# Patient Record
Sex: MALE | Race: WHITE | NOT HISPANIC OR LATINO | Employment: FULL TIME | URBAN - METROPOLITAN AREA
[De-identification: names, ages, dates, MRNs, and addresses within clinical notes are randomized per-mention and may not be internally consistent; named-entity substitution may affect disease eponyms.]

---

## 2017-06-08 ENCOUNTER — OFFICE VISIT (OUTPATIENT)
Dept: MEDICAL GROUP | Facility: MEDICAL CENTER | Age: 42
End: 2017-06-08
Payer: COMMERCIAL

## 2017-06-08 VITALS
BODY MASS INDEX: 35.56 KG/M2 | HEIGHT: 71 IN | OXYGEN SATURATION: 96 % | TEMPERATURE: 98.6 F | HEART RATE: 72 BPM | SYSTOLIC BLOOD PRESSURE: 128 MMHG | RESPIRATION RATE: 14 BRPM | WEIGHT: 254 LBS | DIASTOLIC BLOOD PRESSURE: 80 MMHG

## 2017-06-08 DIAGNOSIS — E11.65 TYPE 2 DIABETES MELLITUS WITH HYPERGLYCEMIA, WITHOUT LONG-TERM CURRENT USE OF INSULIN (HCC): ICD-10-CM

## 2017-06-08 DIAGNOSIS — F31.70 BIPOLAR DISORDER IN FULL REMISSION, MOST RECENT EPISODE UNSPECIFIED TYPE (HCC): ICD-10-CM

## 2017-06-08 DIAGNOSIS — E66.9 OBESITY (BMI 35.0-39.9 WITHOUT COMORBIDITY): ICD-10-CM

## 2017-06-08 DIAGNOSIS — Z72.0 TOBACCO USE: ICD-10-CM

## 2017-06-08 LAB
HBA1C MFR BLD: 13.2 % (ref ?–5.8)
INT CON NEG: NEGATIVE
INT CON POS: POSITIVE

## 2017-06-08 PROCEDURE — 99214 OFFICE O/P EST MOD 30 MIN: CPT | Performed by: FAMILY MEDICINE

## 2017-06-08 PROCEDURE — 83036 HEMOGLOBIN GLYCOSYLATED A1C: CPT | Performed by: FAMILY MEDICINE

## 2017-06-08 RX ORDER — TOPIRAMATE 50 MG/1
50 TABLET, FILM COATED ORAL DAILY
Status: ON HOLD | COMMUNITY
End: 2018-08-13

## 2017-06-08 RX ORDER — TOPIRAMATE 50 MG/1
50 TABLET, FILM COATED ORAL 2 TIMES DAILY
COMMUNITY
End: 2017-06-08

## 2017-06-08 RX ORDER — FLUOXETINE HYDROCHLORIDE 20 MG/1
40 CAPSULE ORAL DAILY
Status: ON HOLD | COMMUNITY
End: 2018-08-13

## 2017-06-08 RX ORDER — TRAZODONE HYDROCHLORIDE 100 MG/1
100 TABLET ORAL NIGHTLY PRN
COMMUNITY
End: 2018-09-20

## 2017-06-08 ASSESSMENT — PATIENT HEALTH QUESTIONNAIRE - PHQ9: CLINICAL INTERPRETATION OF PHQ2 SCORE: 0

## 2017-06-08 NOTE — Clinical Note
[employer]  Luz Peña M.D.  [address]  [place] [address]  Fax: [phone]   Authorization for Release/Disclosure of   Protected Health Information   Name: TYLOR ACEVEDO : 1975 SSN: [id number]   Address: [facility] [address]  [place] [address] Phone:    [phone] (home)    I authorize the entity listed below to release/disclose the PHI below to:   [employer] [facility]/Luz Peña M.D. and Luz Peña M.D.   Provider or Entity Name:  [facility]   City, State, Zip  [place], [place] Phone:      Fax:     Reason for request: continuity of care   Information to be released:    [  ] LAST COLONOSCOPY,  including any PATH REPORT and follow-up  [  ] LAST FIT/COLOGUARD RESULT [  ] LAST DEXA  [  ] LAST MAMMOGRAM  [  ] LAST PAP  [  ] LAST LABS [x  ] RETINA EXAM REPORT  [  ] IMMUNIZATION RECORDS  [  ] Release all info      [  ] Check here and initial the line next to each item to release ALL health information INCLUDING  _____ Care and treatment for drug and / or alcohol abuse  _____ HIV testing, infection status, or AIDS  _____ Genetic Testing    DATES OF SERVICE OR TIME PERIOD TO BE DISCLOSED: _____________  I understand and acknowledge that:  * This Authorization may be revoked at any time by you in writing, except if your health information has already been used or disclosed.  * Your health information that will be used or disclosed as a result of you signing this authorization could be re-disclosed by the recipient. If this occurs, your re-disclosed health information may no longer be protected by State or Federal laws.  * You may refuse to sign this Authorization. Your refusal will not affect your ability to obtain treatment.  * This Authorization becomes effective upon signing and will  on (date) __________.      If no date is indicated, this Authorization will  one (1) year from the signature date.    Name: Tylor Acevedo    Signature:   Date:          6/8/2017       PLEASE FAX REQUESTED RECORDS BACK TO: [phone]

## 2017-06-08 NOTE — PROGRESS NOTES
cc:  diabetes    Subjective:     Tylor Hill is a 41 y.o. male presenting to \[facility]\"" care:      1. Diabetes: was diagnosed with diabetes several years ago, lost about 75lbs and it resolved.  However, he had his DOT re-certification several months ago and was diagnosed with diabetes again.  Weight has been stable.  Notes some increased thirst and urination.  Was on metformin and insulin in the past, tolerated them well.  Otherwise feels well, no vision changes, chest pain, sob, abd pain, leg swelling, numbness.  Had an eye exam about 6 months ago.    2. Bipolar: managed by psych. Is on prozac 40mg daily, topamax 50mg daily, and trazodone 100mg prn for sleep.  Well controlled. Denies any depression or sol or anxiety.    Review of systems:  See above.          Current outpatient prescriptions:   •  fluoxetine (PROZAC) 20 MG Cap, Take 40 mg by mouth every day., Disp: , Rfl:   •  trazodone (DESYREL) 100 MG Tab, Take 100 mg by mouth at bedtime as needed., Disp: , Rfl:   •  topiramate (TOPAMAX) 50 MG tablet, Take 50 mg by mouth every day., Disp: , Rfl:   •  metformin (GLUCOPHAGE) 1000 MG tablet, Take 1 Tab by mouth 2 times a day, with meals., Disp: 180 Tab, Rfl: 3  •  nicotine polacrilex (NICORETTE) 4 MG gum, Take 4 mg by mouth every hour while awake., Disp: 270 Each, Rfl: 3    Allergies   Allergen Reactions   • Hydrocodone-Acetaminophen Hives       Past Medical History   Diagnosis Date   • Mononucleosis    • Diabetes    • Unspecified disorder of thyroid      Past Surgical History   Procedure Laterality Date   • Other orthopedic surgery       1996 - L4-5 diskectomy     No family history on file.  Social History     Social History   • Marital Status:      Spouse Name: N/A   • Number of Children: N/A   • Years of Education: N/A     Occupational History   • Not on file.     Social History Main Topics   • Smoking status: Never Smoker    • Smokeless tobacco: Current User     Types: Chew      Comment:  "chewing tobacco once daily   • Alcohol Use: No   • Drug Use: No   • Sexual Activity: Not on file     Other Topics Concern   • Not on file     Social History Narrative       Objective:     Vitals: /80 mmHg  Pulse 72  Temp(Src) 37 °C (98.6 °F)  Resp 14  Ht 1.803 m (5' 11\")  Wt 115.214 kg (254 lb)  BMI 35.44 kg/m2  SpO2 96%  General: Alert, pleasant, NAD  HEENT: Normocephalic.  PERRL, EOMI, no icterus or pallor.  Conjunctivae and lids normal. External ears normal.  Neck supple.  No thyromegaly or masses palpated. No cervical or supraclavicular lymphadenopathy.  Heart: Regular rate and rhythm.  S1 and S2 normal.  No murmurs appreciated.  Respiratory: Normal respiratory effort.  Clear to auscultation bilaterally.    Abdomen: Non-distended, soft  Skin: Warm, dry, no rashes.  Musculoskeletal: Gait is normal.  Moves all extremities well.  Psych:  Affect/mood is normal, judgement is good, memory is intact, grooming is appropriate.    poc 13.2    Assessment/Plan:     Tylor was seen today for diabetes mellitus.    Diagnoses and all orders for this visit:    Type 2 diabetes mellitus with hyperglycemia, without long-term current use of insulin (CMS-HCC)  Discussed management of diabetes, medications, annual screens. He did not want to start insulin again.  Will start baby aspirin.  Will also start metformin 500mg daily and titrate up to 1000mg bid.  F/u in 4 weeks, will add another med then, will do foot check, eye records requested. Will also check cbc, cmp, lipids, tsh today; pt has been fasting.  -     POCT Hemoglobin A1C  -     CBC WITHOUT DIFFERENTIAL; Future  -     COMP METABOLIC PANEL; Future  -     LIPID PROFILE; Future  -     TSH WITH REFLEX TO FT4; Future  -     metformin (GLUCOPHAGE) 1000 MG tablet; Take 1 Tab by mouth 2 times a day, with meals.    Obesity (BMI 35.0-39.9 without comorbidity) ([facility])  -     Patient identified as having weight management issue.  Appropriate orders and counseling given.  -   "   CBC WITHOUT DIFFERENTIAL; Future  -     COMP METABOLIC PANEL; Future  -     LIPID PROFILE; Future  -     TSH WITH REFLEX TO FT4; Future    Tobacco use  Advised to quit, pt is interested in trying the gum. Script sent        -     nicotine polacrilex (NICORETTE) 4 MG gum; Take 4 mg by mouth every hour while awake.    Bipolar disorder in full remission, most recent episode unspecified type (CMS-HCC)  Noted, stable. Continue current meds and f/u with psych       Patient was seen for a total of 25 minutes face-to-face, with more than half of the time spent counseling or coordinating care regarding the above mentioned problems.     Return for Med check, F/U Diabetes.

## 2017-06-08 NOTE — PATIENT INSTRUCTIONS
Gum: Chew 1 piece of gum when urge to smoke occurs. If strong or frequent cravings are present after 1 piece of gum, may use a second piece within the hour (do not continuously use one piece after the other). Chew slowly until it tingles, then place gum between cheek and gum until tingle is gone; repeat process until most of tingle is gone (~30 minutes). Do not eat or drink 15 minutes before using or while the gum is in mouth.    Patients who smoke their first cigarette within 30 minutes of waking should use the 4 mg strength; otherwise the 2 mg strength is recommended.    Use according to the following 12-week dosing schedule:  Weeks 1 to 6: Chew 1 piece of gum every 1 to 2 hours (maximum: 24 pieces/day); to increase chances of quitting, chew at least 9 pieces/day during the first 6 weeks  Weeks 7 to 9: Chew 1 piece of gum every 2 to 4 hours (maximum: 24 pieces/day)  Weeks 10 to 12: Chew 1 piece of gum every 4 to 8 hours (maximum: 24 pieces/day)

## 2017-06-08 NOTE — MR AVS SNAPSHOT
"        Tylor Hill   2017 8:20 AM   Office Visit   MRN: [id number]    Department:  [address] [place] [address]   Dept Phone:  [phone]    Description:  Male : 1975   Provider:  Luz Peña M.D.           Reason for Visit     Diabetes Mellitus           Allergies as of 2017     Allergen Noted Reactions    Hydrocodone-Acetaminophen 10/06/2007   Hives      You were diagnosed with     Type 2 diabetes mellitus with hyperglycemia, without long-term current use of insulin (CMS-HCC)   [5481653]       Obesity (BMI 35.0-39.9 without comorbidity) ([facility])   [623497]       Tobacco use   [071661]         Vital Signs     Blood Pressure Pulse Temperature Respirations Height Weight    128/80 mmHg 72 37 °C (98.6 °F) 14 1.803 m (5' 11\") 115.214 kg (254 lb)    Body Mass Index Oxygen Saturation Smoking Status             35.44 kg/m2 96% Never Smoker          Basic Information     Date Of Birth Sex Race Ethnicity Preferred Language    1975 Male White Non- English      Your appointments     Jul 10, 2017  7:20 AM   Established Patient with Luz Peña M.D.   [facility] [address]    [address] [facility] [address]  [facility] [address] [phone]           You will be receiving a confirmation call a few days before your appointment from our automated call confirmation system.              Problem List              ICD-10-CM Priority Class Noted - Resolved    Type 2 diabetes mellitus with hyperglycemia, without long-term current use of insulin (CMS-HCC) E11.65   2017 - Present    Obesity (BMI 35.0-39.9 without comorbidity) ([facility]) E66.9   2017 - Present    Tobacco use Z72.0   2017 - Present      Health Maintenance        Date Due Completion Dates    IMM DTaP/Tdap/Td Vaccine (1 - Tdap) 1992 ---            Results     POCT Hemoglobin A1C      Component    Glycohemoglobin    13.2    Internal Control Negative    Negative    Internal Control Positive    Positive   "                     Current Immunizations     Influenza Vaccine Quad Inj (Pf) 9/27/2014      Below and/or attached are the medications your provider expects you to take. Review all of your home medications and newly ordered medications with your provider and/or pharmacist. Follow medication instructions as directed by your provider and/or pharmacist. Please keep your medication list with you and share with your provider. Update the information when medications are discontinued, doses are changed, or new medications (including over-the-counter products) are added; and carry medication information at all times in the event of emergency situations     Allergies:  HYDROCODONE-ACETAMINOPHEN - Hives               Medications  Valid as of: June 08, 2017 -  8:45 AM    Generic Name Brand Name Tablet Size Instructions for use    FLUoxetine HCl (Cap) PROZAC 20 MG Take 40 mg by mouth every day.        MetFORMIN HCl (Tab) GLUCOPHAGE 1000 MG Take 1 Tab by mouth 2 times a day, with meals.        Nicotine Polacrilex (Gum) NICORETTE 4 MG Take 4 mg by mouth every hour while awake.        Topiramate (Tab) TOPAMAX 50 MG Take 50 mg by mouth every day.        TraZODone HCl (Tab) DESYREL 100 MG Take 100 mg by mouth at bedtime as needed.        .                 Medicines prescribed today were sent to:     [facility][id number] - [place], [place] - [address]    [address] [place] [address]    Phone: [phone] Fax: [phone]    Open 24 Hours?: No      Medication refill instructions:       If your prescription bottle indicates you have medication refills left, it is not necessary to call your provider’s office. Please contact your pharmacy and they will refill your medication.    If your prescription bottle indicates you do not have any refills left, you may request refills at any time through one of the following ways: The online [employer] system (except Urgent Care), by calling your provider’s office, or by asking your pharmacy to  contact your provider’s office with a refill request. Medication refills are processed only during regular business hours and may not be available until the next business day. Your provider may request additional information or to have a follow-up visit with you prior to refilling your medication.   *Please Note: Medication refills are assigned a new Rx number when refilled electronically. Your pharmacy may indicate that no refills were authorized even though a new prescription for the same medication is available at the pharmacy. Please request the medicine by name with the pharmacy before contacting your provider for a refill.        Your To Do List     Future Labs/Procedures Complete By Expires    CBC WITHOUT DIFFERENTIAL  As directed 12/6/2017    COMP METABOLIC PANEL  As directed 6/8/2018    LIPID PROFILE  As directed 6/8/2018    TSH WITH REFLEX TO FT4  As directed 6/8/2018      Instructions    Gum: Chew 1 piece of gum when urge to smoke occurs. If strong or frequent cravings are present after 1 piece of gum, may use a second piece within the hour (do not continuously use one piece after the other). Chew slowly until it tingles, then place gum between cheek and gum until tingle is gone; repeat process until most of tingle is gone (~30 minutes). Do not eat or drink 15 minutes before using or while the gum is in mouth.    Patients who smoke their first cigarette within 30 minutes of waking should use the 4 mg strength; otherwise the 2 mg strength is recommended.    Use according to the following 12-week dosing schedule:  Weeks 1 to 6: Chew 1 piece of gum every 1 to 2 hours (maximum: 24 pieces/day); to increase chances of quitting, chew at least 9 pieces/day during the first 6 weeks  Weeks 7 to 9: Chew 1 piece of gum every 2 to 4 hours (maximum: 24 pieces/day)  Weeks 10 to 12: Chew 1 piece of gum every 4 to 8 hours (maximum: 24 pieces/day)            [employer] Access Code: [email or web address]  Expires: 7/6/2017 12:34  PM    [employer]  A secure, online tool to manage your health information     [employer]’s StreetFire® is a secure, online tool that connects you to your personalized health information from the privacy of your home -- day or night - making it very easy for you to manage your healthcare. Once the activation process is completed, you can even access your medical information using the StreetFire ilan, which is available for free in the [employer] Ilan store or Google Play store.     StreetFire provides the following levels of access (as shown below):   My Chart Features   [facility] Primary Care Doctor [facility]  Specialists [facility]  Urgent  Care Non-[facility]  Primary Care  Doctor   Email your healthcare team securely and privately 24/7 X X X    Manage appointments: schedule your next appointment; view details of past/upcoming appointments X      Request prescription refills. X      View recent personal medical records, including lab and immunizations X X X X   View health record, including health history, allergies, medications X X X X   Read reports about your outpatient visits, procedures, consult and ER notes X X X X   See your discharge summary, which is a recap of your hospital and/or ER visit that includes your diagnosis, lab results, and care plan. X X       How to register for StreetFire:  1. Go to  [email or web address].  2. Click on the Sign Up Now box, which takes you to the New Member Sign Up page. You will need to provide the following information:  a. Enter your StreetFire Access Code exactly as it appears at the top of this page. (You will not need to use this code after you’ve completed the sign-up process. If you do not sign up before the expiration date, you must request a new code.)   b. Enter your date of birth.   c. Enter your home email address.   d. Click Submit, and follow the next screen’s instructions.  3. Create a StreetFire ID. This will be your StreetFire login ID and cannot be changed, so think of one that is secure and  easy to remember.  4. Create a Spool password. You can change your password at any time.  5. Enter your Password Reset Question and Answer. This can be used at a later time if you forget your password.   6. Enter your e-mail address. This allows you to receive e-mail notifications when new information is available in Spool.  7. Click Sign Up. You can now view your health information.    For assistance activating your Spool account, call [phone]        Quit Tobacco Information     Do you want to quit using tobacco?    Quitting tobacco decreases risks of cancer, heart and lung disease, increases life expectancy, improves sense of taste and smell, and increases spending money, among other benefits.    If you are thinking about quitting, we can help.  • [facility] Quit Tobacco Program: 849.292.7253  o Program occurs weekly for four weeks and includes pharmacist consultation on products to support quitting smoking or chewing tobacco. A provider referral is needed for pharmacist consultation.  • Tobacco Users Help Hotline: 3-151-QUIT-NOW (176-7676) or https://[place].quitlogix.org/  o Free, confidential telephone and online coaching for [place] residents. Sessions are designed on a schedule that is convenient for you. Eligible clients receive free nicotine replacement therapy.  • Nationally: www.smokefree.gov  o Information and professional assistance to support both immediate and long-term needs as you become, and remain, a non-smoker. Smokefree.gov allows you to choose the help that best fits your needs.

## 2017-06-09 LAB
ALBUMIN SERPL-MCNC: 4.3 G/DL (ref 3.5–5.5)
ALBUMIN/GLOB SERPL: 1.7 {RATIO} (ref 1.2–2.2)
ALP SERPL-CCNC: 93 IU/L (ref 39–117)
ALT SERPL-CCNC: 13 IU/L (ref 0–44)
AST SERPL-CCNC: 12 IU/L (ref 0–40)
BILIRUB SERPL-MCNC: 0.4 MG/DL (ref 0–1.2)
BUN SERPL-MCNC: 12 MG/DL (ref 6–24)
BUN/CREAT SERPL: 17 (ref 9–20)
CALCIUM SERPL-MCNC: 9.7 MG/DL (ref 8.7–10.2)
CHLORIDE SERPL-SCNC: 99 MMOL/L (ref 96–106)
CHOLEST SERPL-MCNC: 204 MG/DL (ref 100–199)
CO2 SERPL-SCNC: 18 MMOL/L (ref 18–29)
COMMENT 011824: ABNORMAL
CREAT SERPL-MCNC: 0.7 MG/DL (ref 0.76–1.27)
ERYTHROCYTE [DISTWIDTH] IN BLOOD BY AUTOMATED COUNT: 12.9 % (ref 12.3–15.4)
GLOBULIN SER CALC-MCNC: 2.5 G/DL (ref 1.5–4.5)
GLUCOSE SERPL-MCNC: 361 MG/DL (ref 65–99)
HCT VFR BLD AUTO: 50.5 % (ref 37.5–51)
HDLC SERPL-MCNC: 41 MG/DL
HGB BLD-MCNC: 16.9 G/DL (ref 12.6–17.7)
LDLC SERPL CALC-MCNC: 110 MG/DL (ref 0–99)
MCH RBC QN AUTO: 31.3 PG (ref 26.6–33)
MCHC RBC AUTO-ENTMCNC: 33.5 G/DL (ref 31.5–35.7)
MCV RBC AUTO: 94 FL (ref 79–97)
NRBC BLD AUTO-RTO: NORMAL %
PLATELET # BLD AUTO: 261 X10E3/UL (ref 150–379)
POTASSIUM SERPL-SCNC: 4.3 MMOL/L (ref 3.5–5.2)
PROT SERPL-MCNC: 6.8 G/DL (ref 6–8.5)
RBC # BLD AUTO: 5.4 X10E6/UL (ref 4.14–5.8)
SODIUM SERPL-SCNC: 135 MMOL/L (ref 134–144)
T4 FREE SERPL-MCNC: 1.38 NG/DL (ref 0.82–1.77)
TRIGL SERPL-MCNC: 264 MG/DL (ref 0–149)
TSH SERPL DL<=0.005 MIU/L-ACNC: 1.3 UIU/ML (ref 0.45–4.5)
VLDLC SERPL CALC-MCNC: 53 MG/DL (ref 5–40)
WBC # BLD AUTO: 6.4 X10E3/UL (ref 3.4–10.8)

## 2017-06-19 ENCOUNTER — TELEPHONE (OUTPATIENT)
Dept: MEDICAL GROUP | Facility: MEDICAL CENTER | Age: 42
End: 2017-06-19

## 2017-06-19 NOTE — TELEPHONE ENCOUNTER
1. Caller Name: Tylor                      Call Back Number: [phone] (home)     2. 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 He wants to make an apt to discuss some paperwork and meds. I called and left  to calls us back to schedule that apt for him.    3. Patient approves office to leave a detailed voicemail/MyChart message: N\A

## 2017-06-20 ENCOUNTER — OFFICE VISIT (OUTPATIENT)
Dept: MEDICAL GROUP | Facility: MEDICAL CENTER | Age: 42
End: 2017-06-20
Payer: COMMERCIAL

## 2017-06-20 VITALS
TEMPERATURE: 96.7 F | OXYGEN SATURATION: 99 % | BODY MASS INDEX: 34.86 KG/M2 | HEART RATE: 88 BPM | RESPIRATION RATE: 14 BRPM | SYSTOLIC BLOOD PRESSURE: 122 MMHG | WEIGHT: 249 LBS | HEIGHT: 71 IN | DIASTOLIC BLOOD PRESSURE: 76 MMHG

## 2017-06-20 DIAGNOSIS — Z72.0 TOBACCO USE: ICD-10-CM

## 2017-06-20 DIAGNOSIS — E11.65 TYPE 2 DIABETES MELLITUS WITH HYPERGLYCEMIA, WITHOUT LONG-TERM CURRENT USE OF INSULIN (HCC): ICD-10-CM

## 2017-06-20 PROBLEM — E66.9 OBESITY (BMI 35.0-39.9 WITHOUT COMORBIDITY): Status: RESOLVED | Noted: 2017-06-08 | Resolved: 2017-06-20

## 2017-06-20 PROCEDURE — 99213 OFFICE O/P EST LOW 20 MIN: CPT | Performed by: FAMILY MEDICINE

## 2017-06-20 RX ORDER — LANCETS 30 GAUGE
EACH MISCELLANEOUS
Qty: 200 EACH | Refills: 3 | Status: ON HOLD | OUTPATIENT
Start: 2017-06-20 | End: 2018-08-03

## 2017-06-20 NOTE — PROGRESS NOTES
cc:  diabetes    Subjective:     Tylor Hill is a 41 y.o. male presenting to discuss his failed DOT physical. His BG at the visit was 301 and failed his physical, his license  last night.  He brings in forms to fill out once his diabetes is under better control and plans to get another physical soon.  He has started the metformin 1000mg BID, denies any side effects.  No hypoglycemic episodes.  He recently found his meter, is not sure if it works, but doesn't have test strips, etc.  Has eliminated most carbs/sugars from his diet, drinks mostly water, but occasionally has a diet coke for the caffeine.  Has lost about 5 lbs since his last visit. Has started the baby aspirin. Denies any chest pain, sob, abd pain, leg swelling.      Review of systems:  See above.          Current outpatient prescriptions:   •  aspirin EC (ECOTRIN) 81 MG Tablet Delayed Response, Take 81 mg by mouth every day., Disp: , Rfl:   •  Dulaglutide 0.75 MG/0.5ML Solution Pen-injector, Inject 0.75 mg as instructed every 7 days., Disp: 1 PEN, Rfl: 1  •  Blood Glucose Monitoring Suppl Kit, Test blood sugar as recommended by provider. Dx E11.65, Disp: 1 Kit, Rfl: PRN  •  Lancets Misc, Use 1x/day and PRN for signs and symptoms of high or low blood sugar. Dx E11.65, Disp: 200 Each, Rfl: 3  •  Blood Glucose Monitoring Suppl SUPPLIES Misc, For test strips: Use 1x/day and PRN for signs and symptoms of high or low blood sugar. Dx E11.65, Disp: 1 Each, Rfl: PRN  •  fluoxetine (PROZAC) 20 MG Cap, Take 40 mg by mouth every day., Disp: , Rfl:   •  trazodone (DESYREL) 100 MG Tab, Take 100 mg by mouth at bedtime as needed., Disp: , Rfl:   •  topiramate (TOPAMAX) 50 MG tablet, Take 50 mg by mouth every day., Disp: , Rfl:   •  metformin (GLUCOPHAGE) 1000 MG tablet, Take 1 Tab by mouth 2 times a day, with meals., Disp: 180 Tab, Rfl: 3  •  nicotine polacrilex (NICORETTE) 4 MG gum, Take 4 mg by mouth every hour while awake., Disp: 270 Each, Rfl:  "3    Allergies   Allergen Reactions   • Hydrocodone-Acetaminophen Hives       Past Medical History   Diagnosis Date   • Mononucleosis    • Diabetes    • Unspecified disorder of thyroid      Past Surgical History   Procedure Laterality Date   • Other orthopedic surgery       1996 - L4-5 diskectomy     No family history on file.  Social History     Social History   • Marital Status:      Spouse Name: N/A   • Number of Children: N/A   • Years of Education: N/A     Occupational History   • Not on file.     Social History Main Topics   • Smoking status: Never Smoker    • Smokeless tobacco: Current User     Types: Chew      Comment: chewing tobacco once daily   • Alcohol Use: No   • Drug Use: No   • Sexual Activity: Not on file     Other Topics Concern   • Not on file     Social History Narrative           Objective:     Vitals: /76 mmHg  Pulse 88  Temp(Src) 35.9 °C (96.7 °F)  Resp 14  Ht 1.803 m (5' 10.98\")  Wt 112.946 kg (249 lb)  BMI 34.74 kg/m2  SpO2 99%  General: Alert, pleasant, NAD  HEENT: Normocephalic.  Psych:  Affect/mood is normal, judgement is good, memory is intact, grooming is appropriate.    Assessment/Plan:     Tylor was seen today for diabetes mellitus.    Diagnoses and all orders for this visit:    Type 2 diabetes mellitus with hyperglycemia, without long-term current use of insulin (CMS-[facility])  Reviewed paperwork with him, was returned to him but will fill out once his BGs improve. Will send to diabetes ed as this was listed as a requirement on his paperwork.  Will also start trulicity once a week.  He will start checking BGs at home and writing them down.  Will return in 2 weeks with the log.  If BGs are improved then, he plans to get another DOT physical so that he can start working.  He will let me know if trulicity is not covered/too expensive. Will try another glp-1.  We also discussed a statin, will hold off for now as he is working on lifestyle " modification and re-assess in 6 months.  ascvd risk is 11.8 as he chews tobacco, is 4.1 if he quits  -     REFERRAL TO DIABETIC EDUCATION Diabetes Self Management Education / Training (DSME/T) and Medical Nutrition Therapy (MNT): Initial Group DSME/MNT as authorized by payor; DSME/T Content: Monitoring Diabetes, Diabetes as disease process, Nutritional Ma  -     Blood Glucose Monitoring Suppl Kit; Test blood sugar as recommended by provider. Dx E11.65  -     Lancets Misc; Use 1x/day and PRN for signs and symptoms of high or low blood sugar. Dx E11.65  -     Blood Glucose Monitoring Suppl SUPPLIES Misc; For test strips: Use 1x/day and PRN for signs and symptoms of high or low blood sugar. Dx E11.65  -     Dulaglutide 0.75 MG/0.5ML Solution Pen-injector; Inject 0.75 mg as instructed every 7 days.    Tobacco use  Advised to quit, pt has been cutting down        Return in about 2 weeks (around 7/4/2017) for F/U Diabetes.

## 2017-06-20 NOTE — MR AVS SNAPSHOT
"        Tylor Hill   2017 9:40 AM   Office Visit   MRN: [id number]    Department:  [address] [place] [address]   Dept Phone:  [phone]    Description:  Male : 1975   Provider:  Luz Peña M.D.           Reason for Visit     Diabetes Mellitus           Allergies as of 2017     Allergen Noted Reactions    Hydrocodone-Acetaminophen 10/06/2007   Hives      You were diagnosed with     Type 2 diabetes mellitus with hyperglycemia, without long-term current use of insulin (CMS-HCC)   [5195151]       Tobacco use   [521499]         Vital Signs     Blood Pressure Pulse Temperature Respirations Height Weight    122/76 mmHg 88 35.9 °C (96.7 °F) 14 1.803 m (5' 10.98\") 112.946 kg (249 lb)    Body Mass Index Oxygen Saturation Smoking Status             34.74 kg/m2 99% Never Smoker          Basic Information     Date Of Birth Sex Race Ethnicity Preferred Language    1975 Male White Non- English      Your appointments     Jul 10, 2017  7:20 AM   Established Patient with Luz Peña M.D.   [facility] [address]    [address] [facility] [address] [place] [id number] [phone]           You will be receiving a confirmation call a few days before your appointment from our automated call confirmation system.            2017 10:40 AM   Established Patient with Luz Peña M.D.   [facility] [address]    [address] [facility] [address]  [facility] [id number] [phone]           You will be receiving a confirmation call a few days before your appointment from our automated call confirmation system.              Problem List              ICD-10-CM Priority Class Noted - Resolved    Type 2 diabetes mellitus with hyperglycemia, without long-term current use of insulin (CMS-HCC) E11.65   2017 - Present    Tobacco use Z72.0   2017 - Present    Bipolar disorder in full remission (CMS-HCC) F31.70   2017 - Present      Health " Maintenance        Date Due Completion Dates    IMM HEP B VACCINE (1 of 3 - Primary Series) 1975 ---    DIABETES MONOFILAMENT / LE EXAM 3/6/1974 ---    URINE ACR / MICROALBUMIN 9/6/1991 ---    IMM DTaP/Tdap/Td Vaccine (1 - Tdap) 9/6/1992 ---    IMM PNEUMOCOCCAL 19-64 (ADULT) MEDIUM RISK SERIES (1 of 1 - PPSV23) 9/6/1992 ---    A1C SCREENING 12/8/2017 6/8/2017, 6/14/2012, 9/4/2009    RETINAL SCREENING 12/9/2017 12/9/2016    FASTING LIPID PROFILE 6/8/2018 6/8/2017, 6/14/2012, 9/4/2009    SERUM CREATININE 6/8/2018 6/8/2017, 6/14/2012, 6/13/2012, 9/4/2009, 9/3/2009            Current Immunizations     Influenza Vaccine Quad Inj (Pf) 9/27/2014      Below and/or attached are the medications your provider expects you to take. Review all of your home medications and newly ordered medications with your provider and/or pharmacist. Follow medication instructions as directed by your provider and/or pharmacist. Please keep your medication list with you and share with your provider. Update the information when medications are discontinued, doses are changed, or new medications (including over-the-counter products) are added; and carry medication information at all times in the event of emergency situations     Allergies:  HYDROCODONE-ACETAMINOPHEN - Hives               Medications  Valid as of: June 20, 2017 -  9:45 AM    Generic Name Brand Name Tablet Size Instructions for use    Aspirin (Tablet Delayed Response) ECOTRIN 81 MG Take 81 mg by mouth every day.        Blood Glucose Monitoring Suppl (Kit) Blood Glucose Monitoring Suppl  Test blood sugar as recommended by provider. Dx E11.65        Blood Glucose Monitoring Suppl (Misc) Blood Glucose Monitoring Suppl SUPPLIES For test strips: Use 1x/day and PRN for signs and symptoms of high or low blood sugar. Dx E11.65        Dulaglutide (Solution Pen-injector) Dulaglutide 0.75 MG/0.5ML Inject 0.75 mg as instructed every 7 days.        FLUoxetine HCl (Cap) PROZAC 20 MG Take 40 mg  by mouth every day.        Lancets (Misc) Lancets  Use 1x/day and PRN for signs and symptoms of high or low blood sugar. Dx E11.65        MetFORMIN HCl (Tab) GLUCOPHAGE 1000 MG Take 1 Tab by mouth 2 times a day, with meals.        Nicotine Polacrilex (Gum) NICORETTE 4 MG Take 4 mg by mouth every hour while awake.        Topiramate (Tab) TOPAMAX 50 MG Take 50 mg by mouth every day.        TraZODone HCl (Tab) DESYREL 100 MG Take 100 mg by mouth at bedtime as needed.        .                 Medicines prescribed today were sent to:     [id number] - [place], [place] - [address]    [address] [place] [address]    Phone: [phone] Fax: [phone]    Open 24 Hours?: No      Medication refill instructions:       If your prescription bottle indicates you have medication refills left, it is not necessary to call your provider’s office. Please contact your pharmacy and they will refill your medication.    If your prescription bottle indicates you do not have any refills left, you may request refills at any time through one of the following ways: The online [employer] system (except Urgent Care), by calling your provider’s office, or by asking your pharmacy to contact your provider’s office with a refill request. Medication refills are processed only during regular business hours and may not be available until the next business day. Your provider may request additional information or to have a follow-up visit with you prior to refilling your medication.   *Please Note: Medication refills are assigned a new Rx number when refilled electronically. Your pharmacy may indicate that no refills were authorized even though a new prescription for the same medication is available at the pharmacy. Please request the medicine by name with the pharmacy before contacting your provider for a refill.        Referral     A referral request has been sent to our patient care coordination department. Please allow 3-5 business days  for us to process this request and contact you either by phone or mail. If you do not hear from us by the 5th business day, please call us at [phone].           [employer] Access Code: [email or web address]  Expires: 7/6/2017 12:34 PM    [employer]  A secure, online tool to manage your health information     [employer]’s [employer]® is a secure, online tool that connects you to your personalized health information from the privacy of your home -- day or night - making it very easy for you to manage your healthcare. Once the activation process is completed, you can even access your medical information using the [employer] ilan, which is available for free in the Apple Ilan store or Google Play store.     [employer] provides the following levels of access (as shown below):   My Chart Features   [facility] Doctor [facility] [place] [facility]  Urgent  Care Non-[facility]  Primary Care  Doctor   Email your healthcare team securely and privately 24/7 X X X    Manage appointments: schedule your next appointment; view details of past/upcoming appointments X      Request prescription refills. X      View recent personal medical records, including lab and immunizations X X X X   View health record, including health history, allergies, medications X X X X   Read reports about your outpatient visits, procedures, consult and ER notes X X X X   See your discharge summary, which is a recap of your hospital and/or ER visit that includes your diagnosis, lab results, and care plan. X X       How to register for [employer]:  1. Go to  [email or web address].  2. Click on the Sign Up Now box, which takes you to the New Member Sign Up page. You will need to provide the following information:  a. Enter your [employer] Access Code exactly as it appears at the top of this page. (You will not need to use this code after you’ve completed the sign-up process. If you do not sign up before the expiration date, you must request a new code.)   b. Enter  your date of birth.   c. Enter your home email address.   d. Click Submit, and follow the next screen’s instructions.  3. Create a [employer] ID. This will be your [employer] login ID and cannot be changed, so think of one that is secure and easy to remember.  4. Create a [employer] password. You can change your password at any time.  5. Enter your Password Reset Question and Answer. This can be used at a later time if you forget your password.   6. Enter your e-mail address. This allows you to receive e-mail notifications when new information is available in [employer].  7. Click Sign Up. You can now view your health information.    For assistance activating your [employer] account, call [phone]        Quit Tobacco Information     Do you want to quit using tobacco?    Quitting tobacco decreases risks of cancer, heart and lung disease, increases life expectancy, improves sense of taste and smell, and increases spending money, among other benefits.    If you are thinking about quitting, we can help.  • [facility] Quit Tobacco Program: 862.616.3475  o Program occurs weekly for four weeks and includes pharmacist consultation on products to support quitting smoking or chewing tobacco. A provider referral is needed for pharmacist consultation.  • Tobacco Users Help Hotline: 5-042-QUITNOW (931-8320) or https://[place].quitlogix.org/  o Free, confidential telephone and online coaching for [place] residents. Sessions are designed on a schedule that is convenient for you. Eligible clients receive free nicotine replacement therapy.  • Nationally: www.smokefree.gov  o Information and professional assistance to support both immediate and long-term needs as you become, and remain, a non-smoker. Smokefree.gov allows you to choose the help that best fits your needs.

## 2017-10-11 ENCOUNTER — OFFICE VISIT (OUTPATIENT)
Dept: URGENT CARE | Facility: CLINIC | Age: 42
End: 2017-10-11
Payer: COMMERCIAL

## 2017-10-11 VITALS
OXYGEN SATURATION: 96 % | HEART RATE: 72 BPM | WEIGHT: 225 LBS | SYSTOLIC BLOOD PRESSURE: 120 MMHG | BODY MASS INDEX: 31.5 KG/M2 | HEIGHT: 71 IN | DIASTOLIC BLOOD PRESSURE: 80 MMHG | RESPIRATION RATE: 20 BRPM | TEMPERATURE: 98 F

## 2017-10-11 DIAGNOSIS — J11.1 INFLUENZA-LIKE ILLNESS: ICD-10-CM

## 2017-10-11 PROCEDURE — 99214 OFFICE O/P EST MOD 30 MIN: CPT | Performed by: PHYSICIAN ASSISTANT

## 2017-10-11 RX ORDER — CODEINE PHOSPHATE AND GUAIFENESIN 10; 100 MG/5ML; MG/5ML
5 SOLUTION ORAL EVERY 4 HOURS PRN
Qty: 120 ML | Refills: 0 | Status: ON HOLD | OUTPATIENT
Start: 2017-10-11 | End: 2018-08-13

## 2017-10-11 ASSESSMENT — ENCOUNTER SYMPTOMS
SORE THROAT: 1
WHEEZING: 0
CHILLS: 1
HEADACHES: 1
SPUTUM PRODUCTION: 0
HEMOPTYSIS: 0
SWEATS: 1
PALPITATIONS: 0
FEVER: 1
SHORTNESS OF BREATH: 0
MYALGIAS: 1
RHINORRHEA: 1
COUGH: 1

## 2017-10-11 NOTE — LETTER
October 11, 2017         Patient: Tylor Hill   YOB: 1975   Date of Visit: 10/11/2017           To Whom it May Concern:    Tylor Hill was seen in my clinic on 10/11/2017. Please excuse him from work 10/11-10/13/2017.  If you have any questions or concerns, please don't hesitate to call.        Sincerely,           Radhames Pino P.A.-C.  Electronically Signed

## 2017-10-11 NOTE — PATIENT INSTRUCTIONS
"Influenza, Adult  Influenza (\"the flu\") is a viral infection of the respiratory tract. It occurs more often in winter months because people spend more time in close contact with one another. Influenza can make you feel very sick. Influenza easily spreads from person to person (contagious).  CAUSES   Influenza is caused by a virus that infects the respiratory tract. You can catch the virus by breathing in droplets from an infected person's cough or sneeze. You can also catch the virus by touching something that was recently contaminated with the virus and then touching your mouth, nose, or eyes.  RISKS AND COMPLICATIONS  You may be at risk for a more severe case of influenza if you smoke cigarettes, have diabetes, have chronic heart disease (such as heart failure) or lung disease (such as asthma), or if you have a weakened immune system. Elderly people and pregnant women are also at risk for more serious infections. The most common problem of influenza is a lung infection (pneumonia). Sometimes, this problem can require emergency medical care and may be life threatening.  SIGNS AND SYMPTOMS   Symptoms typically last 4 to 10 days and may include:  · Fever.  · Chills.  · Headache, body aches, and muscle aches.  · Sore throat.  · Chest discomfort and cough.  · Poor appetite.  · Weakness or feeling tired.  · Dizziness.  · Nausea or vomiting.  DIAGNOSIS   Diagnosis of influenza is often made based on your history and a physical exam. A nose or throat swab test can be done to confirm the diagnosis.  TREATMENT   In mild cases, influenza goes away on its own. Treatment is directed at relieving symptoms. For more severe cases, your health care provider may prescribe antiviral medicines to shorten the sickness. Antibiotic medicines are not effective because the infection is caused by a virus, not by bacteria.  HOME CARE INSTRUCTIONS  · Take medicines only as directed by your health care provider.  · Use a cool mist humidifier " to make breathing easier.  · Get plenty of rest until your temperature returns to normal. This usually takes 3 to 4 days.  · Drink enough fluid to keep your urine clear or pale yellow.  · Cover your mouth and nose when coughing or sneezing, and wash your hands well to prevent the virus from spreading.  · Stay home from work or school until the fever is gone for at least 1 full day.  PREVENTION   An annual influenza vaccination (flu shot) is the best way to avoid getting influenza. An annual flu shot is now routinely recommended for all adults in the [place]  SEEK MEDICAL CARE IF:  · You experience chest pain, your cough worsens, or you produce more mucus.  · You have nausea, vomiting, or diarrhea.  · Your fever returns or gets worse.  SEEK IMMEDIATE MEDICAL CARE IF:  · You have trouble breathing, you become short of breath, or your skin or nails become bluish.  · You have severe pain or stiffness in the neck.  · You develop a sudden headache, or pain in the face or ear.  · You have nausea or vomiting that you cannot control.  MAKE SURE YOU:   · Understand these instructions.  · Will watch your condition.  · Will get help right away if you are not doing well or get worse.     This information is not intended to replace advice given to you by your health care provider. Make sure you discuss any questions you have with your health care provider.     Document Released: 12/15/2001 Document Revised: 01/08/2016 Document Reviewed: 03/18/2013  [employer] Interactive Patient Education ©2016 [employer]

## 2017-10-11 NOTE — PROGRESS NOTES
Subjective:      Tylor Hill is a 42 y.o. male who presents with Cough (Couple days dry cough , stuffy nose , fever and bodyache)            Cough   This is a new problem. The problem has been unchanged. The cough is non-productive. Associated symptoms include chills, ear pain, a fever, headaches, myalgias, nasal congestion, rhinorrhea, a sore throat and sweats. Pertinent negatives include no chest pain, hemoptysis, shortness of breath or wheezing. Nothing aggravates the symptoms. He has tried OTC cough suppressant for the symptoms. The treatment provided no relief. There is no history of asthma.       Review of Systems   Constitutional: Positive for chills, fever and malaise/fatigue.   HENT: Positive for congestion, ear pain, rhinorrhea and sore throat.    Respiratory: Positive for cough. Negative for hemoptysis, sputum production, shortness of breath and wheezing.    Cardiovascular: Negative for chest pain and palpitations.   Musculoskeletal: Positive for myalgias.   Neurological: Positive for headaches.   All other systems reviewed and are negative.    PMH:  has a past medical history of Diabetes; Mononucleosis; and Unspecified disorder of thyroid. He also has no past medical history of ASTHMA; COPD; EMPHYSEMA; or Seizure (CMS-HCC).  MEDS:   Current Outpatient Prescriptions:   •  guaifenesin-codeine (CHERATUSSIN AC) Solution oral solution, Take 5 mL by mouth every four hours as needed for Cough., Disp: 120 mL, Rfl: 0  •  fluoxetine (PROZAC) 20 MG Cap, Take 40 mg by mouth every day., Disp: , Rfl:   •  trazodone (DESYREL) 100 MG Tab, Take 100 mg by mouth at bedtime as needed., Disp: , Rfl:   •  topiramate (TOPAMAX) 50 MG tablet, Take 50 mg by mouth every day., Disp: , Rfl:   •  aspirin EC (ECOTRIN) 81 MG Tablet Delayed Response, Take 81 mg by mouth every day., Disp: , Rfl:   •  Dulaglutide 0.75 MG/0.5ML Solution Pen-injector, Inject 0.75 mg as instructed every 7 days., Disp: 1 PEN, Rfl: 1  •  Blood  "Glucose Monitoring Suppl Kit, Test blood sugar as recommended by provider. Dx E11.65, Disp: 1 Kit, Rfl: PRN  •  Lancets Misc, Use 1x/day and PRN for signs and symptoms of high or low blood sugar. Dx E11.65, Disp: 200 Each, Rfl: 3  •  Blood Glucose Monitoring Suppl SUPPLIES Misc, For test strips: Use 1x/day and PRN for signs and symptoms of high or low blood sugar. Dx E11.65, Disp: 1 Each, Rfl: PRN  •  metformin (GLUCOPHAGE) 1000 MG tablet, Take 1 Tab by mouth 2 times a day, with meals., Disp: 180 Tab, Rfl: 3  •  nicotine polacrilex (NICORETTE) 4 MG gum, Take 4 mg by mouth every hour while awake., Disp: 270 Each, Rfl: 3  ALLERGIES:   Allergies   Allergen Reactions   • Hydrocodone-Acetaminophen Hives     SURGHX:   Past Surgical History:   Procedure Laterality Date   • OTHER ORTHOPEDIC SURGERY      1996 - L4-5 diskectomy     SOCHX:  reports that he has never smoked. His smokeless tobacco use includes Chew. He reports that he does not drink alcohol or use drugs.  FH: Family history was reviewed, no pertinent findings to report  Medications, Allergies, and current problem list reviewed today in Epic       Objective:     /80   Pulse 72   Temp 36.7 °C (98 °F)   Resp 20   Ht 1.803 m (5' 11\")   Wt 102.1 kg (225 lb)   SpO2 96%   BMI 31.38 kg/m²      Physical Exam   Constitutional: He is oriented to person, place, and time. He appears well-developed and well-nourished. He is active.  Non-toxic appearance. He does not have a sickly appearance. He does not appear ill. No distress. He is not intubated.   HENT:   Head: Normocephalic and atraumatic.   Right Ear: Hearing, tympanic membrane, external ear and ear canal normal.   Left Ear: Hearing, tympanic membrane, external ear and ear canal normal.   Nose: Nose normal.   Mouth/Throat: Uvula is midline, oropharynx is clear and moist and mucous membranes are normal.   Eyes: Conjunctivae, EOM and lids are normal.   Neck: Normal range of motion. Neck supple. "   Cardiovascular: Regular rhythm, S1 normal, S2 normal and normal heart sounds.  Exam reveals no gallop and no friction rub.    No murmur heard.  Pulmonary/Chest: Effort normal and breath sounds normal. No accessory muscle usage. No apnea, no tachypnea and no bradypnea. He is not intubated. No respiratory distress. He has no decreased breath sounds. He has no wheezes. He has no rhonchi. He has no rales. He exhibits no tenderness.   Musculoskeletal: Normal range of motion.   Neurological: He is alert and oriented to person, place, and time.   Skin: Skin is warm and dry.   Psychiatric: He has a normal mood and affect. His speech is normal and behavior is normal. Judgment and thought content normal.   Vitals reviewed.              Assessment/Plan:     1. Influenza-like illness  - rest, fluids  - Work note given  - guaifenesin-codeine (CHERATUSSIN AC) Solution oral solution; Take 5 mL by mouth every four hours as needed for Cough.  Dispense: 120 mL; Refill: 0    Differential diagnosis, natural history, supportive care, and indications for immediate follow-up discussed at length.   Follow-up with primary care provider within 4-5 days, emergency room precautions discussed.  Patient and/or family appears understanding of information.

## 2018-08-03 ENCOUNTER — APPOINTMENT (OUTPATIENT)
Dept: RADIOLOGY | Facility: MEDICAL CENTER | Age: 43
End: 2018-08-03
Attending: EMERGENCY MEDICINE

## 2018-08-03 ENCOUNTER — HOSPITAL ENCOUNTER (OUTPATIENT)
Facility: MEDICAL CENTER | Age: 43
End: 2018-08-13
Attending: EMERGENCY MEDICINE | Admitting: HOSPITALIST

## 2018-08-03 DIAGNOSIS — I95.89 OTHER SPECIFIED HYPOTENSION: ICD-10-CM

## 2018-08-03 DIAGNOSIS — Z91.148 NONCOMPLIANCE WITH MEDICATION REGIMEN: ICD-10-CM

## 2018-08-03 DIAGNOSIS — E86.0 DEHYDRATION: ICD-10-CM

## 2018-08-03 DIAGNOSIS — F31.9 BIPOLAR AFFECTIVE DISORDER, REMISSION STATUS UNSPECIFIED (HCC): ICD-10-CM

## 2018-08-03 DIAGNOSIS — G93.40 ACUTE ENCEPHALOPATHY: ICD-10-CM

## 2018-08-03 DIAGNOSIS — F41.9 ANXIETY: ICD-10-CM

## 2018-08-03 DIAGNOSIS — E11.65 TYPE 2 DIABETES MELLITUS WITH HYPERGLYCEMIA, WITHOUT LONG-TERM CURRENT USE OF INSULIN (HCC): ICD-10-CM

## 2018-08-03 DIAGNOSIS — T50.904A DRUG OVERDOSE, UNDETERMINED INTENT, INITIAL ENCOUNTER: ICD-10-CM

## 2018-08-03 DIAGNOSIS — R73.9 HYPERGLYCEMIA: ICD-10-CM

## 2018-08-03 PROBLEM — R45.851 SUICIDAL IDEATIONS: Status: ACTIVE | Noted: 2018-08-03

## 2018-08-03 PROBLEM — I95.9 HYPOTENSION: Status: ACTIVE | Noted: 2018-08-03

## 2018-08-03 PROBLEM — T50.901A DRUG OVERDOSE: Status: ACTIVE | Noted: 2018-08-03

## 2018-08-03 LAB
ACTION RANGE TRIGGERED IACRT: NO
ALBUMIN SERPL BCP-MCNC: 4.1 G/DL (ref 3.2–4.9)
ALBUMIN/GLOB SERPL: 1.9 G/DL
ALP SERPL-CCNC: 98 U/L (ref 30–99)
ALT SERPL-CCNC: 14 U/L (ref 2–50)
ANION GAP SERPL CALC-SCNC: 11 MMOL/L (ref 0–11.9)
APAP SERPL-MCNC: <10 UG/ML (ref 10–30)
APTT PPP: 22.3 SEC (ref 24.7–36)
AST SERPL-CCNC: 12 U/L (ref 12–45)
B-OH-BUTYR SERPL-MCNC: 0.27 MMOL/L (ref 0.02–0.27)
BASE EXCESS BLDA CALC-SCNC: -3 MMOL/L (ref -4–3)
BASOPHILS # BLD AUTO: 0 % (ref 0–1.8)
BASOPHILS # BLD: 0 K/UL (ref 0–0.12)
BILIRUB SERPL-MCNC: 0.5 MG/DL (ref 0.1–1.5)
BLOOD CULTURE HOLD CXBCH: NORMAL
BNP SERPL-MCNC: 3 PG/ML (ref 0–100)
BODY TEMPERATURE: ABNORMAL DEGREES
BUN SERPL-MCNC: 13 MG/DL (ref 8–22)
CALCIUM SERPL-MCNC: 8.6 MG/DL (ref 8.5–10.5)
CHLORIDE SERPL-SCNC: 99 MMOL/L (ref 96–112)
CO2 BLDA-SCNC: 22 MMOL/L (ref 20–33)
CO2 SERPL-SCNC: 20 MMOL/L (ref 20–33)
CREAT SERPL-MCNC: 0.88 MG/DL (ref 0.5–1.4)
EKG IMPRESSION: NORMAL
EOSINOPHIL # BLD AUTO: 0.59 K/UL (ref 0–0.51)
EOSINOPHIL NFR BLD: 5.2 % (ref 0–6.9)
ERYTHROCYTE [DISTWIDTH] IN BLOOD BY AUTOMATED COUNT: 37 FL (ref 35.9–50)
EST. AVERAGE GLUCOSE BLD GHB EST-MCNC: 315 MG/DL
GLOBULIN SER CALC-MCNC: 2.2 G/DL (ref 1.9–3.5)
GLUCOSE BLD-MCNC: 227 MG/DL (ref 65–99)
GLUCOSE BLD-MCNC: 330 MG/DL (ref 65–99)
GLUCOSE BLD-MCNC: 411 MG/DL (ref 65–99)
GLUCOSE SERPL-MCNC: 457 MG/DL (ref 65–99)
HBA1C MFR BLD: 12.6 % (ref 0–5.6)
HCO3 BLDA-SCNC: 21.4 MMOL/L (ref 17–25)
HCT VFR BLD AUTO: 45.9 % (ref 42–52)
HGB BLD-MCNC: 15.9 G/DL (ref 14–18)
INR PPP: 1.43 (ref 0.87–1.13)
INST. QUALIFIED PATIENT IIQPT: YES
LYMPHOCYTES # BLD AUTO: 8.23 K/UL (ref 1–4.8)
LYMPHOCYTES NFR BLD: 72.2 % (ref 22–41)
MAGNESIUM SERPL-MCNC: 1.8 MG/DL (ref 1.5–2.5)
MANUAL DIFF BLD: NORMAL
MCH RBC QN AUTO: 29.9 PG (ref 27–33)
MCHC RBC AUTO-ENTMCNC: 34.6 G/DL (ref 33.7–35.3)
MCV RBC AUTO: 86.3 FL (ref 81.4–97.8)
MONOCYTES # BLD AUTO: 0.8 K/UL (ref 0–0.85)
MONOCYTES NFR BLD AUTO: 7 % (ref 0–13.4)
MORPHOLOGY BLD-IMP: NORMAL
NEUTROPHILS # BLD AUTO: 1.78 K/UL (ref 1.82–7.42)
NEUTROPHILS NFR BLD: 15.6 % (ref 44–72)
NRBC # BLD AUTO: 0 K/UL
NRBC BLD-RTO: 0 /100 WBC
O2/TOTAL GAS SETTING VFR VENT: 21 %
PCO2 BLDA: 35.1 MMHG (ref 26–37)
PH BLDA: 7.39 [PH] (ref 7.4–7.5)
PLATELET # BLD AUTO: 268 K/UL (ref 164–446)
PLATELET BLD QL SMEAR: NORMAL
PMV BLD AUTO: 10.5 FL (ref 9–12.9)
PO2 BLDA: 68 MMHG (ref 64–87)
POTASSIUM SERPL-SCNC: 3.8 MMOL/L (ref 3.6–5.5)
PROT SERPL-MCNC: 6.3 G/DL (ref 6–8.2)
PROTHROMBIN TIME: 17.1 SEC (ref 12–14.6)
RBC # BLD AUTO: 5.32 M/UL (ref 4.7–6.1)
RBC BLD AUTO: PRESENT
SALICYLATES SERPL-MCNC: 0 MG/DL (ref 15–25)
SAO2 % BLDA: 93 % (ref 93–99)
SMUDGE CELLS BLD QL SMEAR: NORMAL
SODIUM SERPL-SCNC: 130 MMOL/L (ref 135–145)
SPECIMEN DRAWN FROM PATIENT: ABNORMAL
TROPONIN I SERPL-MCNC: <0.01 NG/ML (ref 0–0.04)
TSH SERPL DL<=0.005 MIU/L-ACNC: 2.58 UIU/ML (ref 0.38–5.33)
WBC # BLD AUTO: 11.4 K/UL (ref 4.8–10.8)

## 2018-08-03 PROCEDURE — 304561 HCHG STAT O2

## 2018-08-03 PROCEDURE — 96361 HYDRATE IV INFUSION ADD-ON: CPT

## 2018-08-03 PROCEDURE — 96360 HYDRATION IV INFUSION INIT: CPT

## 2018-08-03 PROCEDURE — 80307 DRUG TEST PRSMV CHEM ANLYZR: CPT

## 2018-08-03 PROCEDURE — 93005 ELECTROCARDIOGRAM TRACING: CPT | Performed by: EMERGENCY MEDICINE

## 2018-08-03 PROCEDURE — 700105 HCHG RX REV CODE 258: Performed by: HOSPITALIST

## 2018-08-03 PROCEDURE — G0378 HOSPITAL OBSERVATION PER HR: HCPCS

## 2018-08-03 PROCEDURE — 85007 BL SMEAR W/DIFF WBC COUNT: CPT

## 2018-08-03 PROCEDURE — 700102 HCHG RX REV CODE 250 W/ 637 OVERRIDE(OP): Performed by: HOSPITALIST

## 2018-08-03 PROCEDURE — 82010 KETONE BODYS QUAN: CPT

## 2018-08-03 PROCEDURE — 84484 ASSAY OF TROPONIN QUANT: CPT

## 2018-08-03 PROCEDURE — 82962 GLUCOSE BLOOD TEST: CPT

## 2018-08-03 PROCEDURE — 71045 X-RAY EXAM CHEST 1 VIEW: CPT

## 2018-08-03 PROCEDURE — 36415 COLL VENOUS BLD VENIPUNCTURE: CPT

## 2018-08-03 PROCEDURE — A9270 NON-COVERED ITEM OR SERVICE: HCPCS | Performed by: HOSPITALIST

## 2018-08-03 PROCEDURE — 85730 THROMBOPLASTIN TIME PARTIAL: CPT

## 2018-08-03 PROCEDURE — 85027 COMPLETE CBC AUTOMATED: CPT

## 2018-08-03 PROCEDURE — 700105 HCHG RX REV CODE 258: Performed by: EMERGENCY MEDICINE

## 2018-08-03 PROCEDURE — 84443 ASSAY THYROID STIM HORMONE: CPT

## 2018-08-03 PROCEDURE — 82803 BLOOD GASES ANY COMBINATION: CPT

## 2018-08-03 PROCEDURE — 99220 PR INITIAL OBSERVATION CARE,LEVL III: CPT | Performed by: HOSPITALIST

## 2018-08-03 PROCEDURE — 83880 ASSAY OF NATRIURETIC PEPTIDE: CPT

## 2018-08-03 PROCEDURE — 99285 EMERGENCY DEPT VISIT HI MDM: CPT

## 2018-08-03 PROCEDURE — 83735 ASSAY OF MAGNESIUM: CPT

## 2018-08-03 PROCEDURE — 85610 PROTHROMBIN TIME: CPT

## 2018-08-03 PROCEDURE — 83036 HEMOGLOBIN GLYCOSYLATED A1C: CPT

## 2018-08-03 PROCEDURE — 80053 COMPREHEN METABOLIC PANEL: CPT

## 2018-08-03 RX ORDER — ONDANSETRON 2 MG/ML
4 INJECTION INTRAMUSCULAR; INTRAVENOUS EVERY 4 HOURS PRN
Status: DISCONTINUED | OUTPATIENT
Start: 2018-08-03 | End: 2018-08-13 | Stop reason: HOSPADM

## 2018-08-03 RX ORDER — BISACODYL 10 MG
10 SUPPOSITORY, RECTAL RECTAL
Status: DISCONTINUED | OUTPATIENT
Start: 2018-08-03 | End: 2018-08-13 | Stop reason: HOSPADM

## 2018-08-03 RX ORDER — DEXTROSE MONOHYDRATE 25 G/50ML
25 INJECTION, SOLUTION INTRAVENOUS
Status: DISCONTINUED | OUTPATIENT
Start: 2018-08-03 | End: 2018-08-10

## 2018-08-03 RX ORDER — HEPARIN SODIUM 5000 [USP'U]/ML
5000 INJECTION, SOLUTION INTRAVENOUS; SUBCUTANEOUS EVERY 8 HOURS
Status: DISCONTINUED | OUTPATIENT
Start: 2018-08-03 | End: 2018-08-13 | Stop reason: HOSPADM

## 2018-08-03 RX ORDER — AMOXICILLIN 250 MG
2 CAPSULE ORAL 2 TIMES DAILY
Status: DISCONTINUED | OUTPATIENT
Start: 2018-08-03 | End: 2018-08-13 | Stop reason: HOSPADM

## 2018-08-03 RX ORDER — FLUOXETINE HYDROCHLORIDE 20 MG/1
40 CAPSULE ORAL DAILY
Status: DISCONTINUED | OUTPATIENT
Start: 2018-08-03 | End: 2018-08-06

## 2018-08-03 RX ORDER — ARIPIPRAZOLE 10 MG/1
5 TABLET ORAL DAILY
Status: DISCONTINUED | OUTPATIENT
Start: 2018-08-04 | End: 2018-08-06

## 2018-08-03 RX ORDER — TOPIRAMATE 25 MG/1
50 TABLET ORAL DAILY
Status: DISCONTINUED | OUTPATIENT
Start: 2018-08-03 | End: 2018-08-06

## 2018-08-03 RX ORDER — SODIUM CHLORIDE 9 MG/ML
INJECTION, SOLUTION INTRAVENOUS CONTINUOUS
Status: DISCONTINUED | OUTPATIENT
Start: 2018-08-03 | End: 2018-08-13 | Stop reason: HOSPADM

## 2018-08-03 RX ORDER — PROMETHAZINE HYDROCHLORIDE 25 MG/1
12.5-25 TABLET ORAL EVERY 4 HOURS PRN
Status: DISCONTINUED | OUTPATIENT
Start: 2018-08-03 | End: 2018-08-13 | Stop reason: HOSPADM

## 2018-08-03 RX ORDER — POLYETHYLENE GLYCOL 3350 17 G/17G
1 POWDER, FOR SOLUTION ORAL
Status: DISCONTINUED | OUTPATIENT
Start: 2018-08-03 | End: 2018-08-13 | Stop reason: HOSPADM

## 2018-08-03 RX ORDER — SODIUM CHLORIDE 9 MG/ML
1000 INJECTION, SOLUTION INTRAVENOUS ONCE
Status: COMPLETED | OUTPATIENT
Start: 2018-08-03 | End: 2018-08-03

## 2018-08-03 RX ORDER — ONDANSETRON 4 MG/1
4 TABLET, ORALLY DISINTEGRATING ORAL EVERY 4 HOURS PRN
Status: DISCONTINUED | OUTPATIENT
Start: 2018-08-03 | End: 2018-08-13 | Stop reason: HOSPADM

## 2018-08-03 RX ORDER — SODIUM CHLORIDE 9 MG/ML
30 INJECTION, SOLUTION INTRAVENOUS ONCE
Status: COMPLETED | OUTPATIENT
Start: 2018-08-03 | End: 2018-08-03

## 2018-08-03 RX ORDER — PROMETHAZINE HYDROCHLORIDE 25 MG/1
12.5-25 SUPPOSITORY RECTAL EVERY 4 HOURS PRN
Status: DISCONTINUED | OUTPATIENT
Start: 2018-08-03 | End: 2018-08-13 | Stop reason: HOSPADM

## 2018-08-03 RX ORDER — INSULIN GLARGINE 100 [IU]/ML
10 INJECTION, SOLUTION SUBCUTANEOUS EVERY EVENING
Status: DISCONTINUED | OUTPATIENT
Start: 2018-08-03 | End: 2018-08-04

## 2018-08-03 RX ADMIN — SODIUM CHLORIDE: 9 INJECTION, SOLUTION INTRAVENOUS at 06:17

## 2018-08-03 RX ADMIN — FLUOXETINE HYDROCHLORIDE 40 MG: 20 CAPSULE ORAL at 11:24

## 2018-08-03 RX ADMIN — METFORMIN HYDROCHLORIDE 1000 MG: 500 TABLET, FILM COATED ORAL at 18:17

## 2018-08-03 RX ADMIN — METFORMIN HYDROCHLORIDE 1000 MG: 500 TABLET, FILM COATED ORAL at 11:26

## 2018-08-03 RX ADMIN — ASPIRIN 81 MG: 81 TABLET, COATED ORAL at 11:26

## 2018-08-03 RX ADMIN — SODIUM CHLORIDE: 9 INJECTION, SOLUTION INTRAVENOUS at 16:54

## 2018-08-03 RX ADMIN — SODIUM CHLORIDE 1000 ML: 9 INJECTION, SOLUTION INTRAVENOUS at 04:15

## 2018-08-03 RX ADMIN — SODIUM CHLORIDE 3063 ML: 9 INJECTION, SOLUTION INTRAVENOUS at 01:09

## 2018-08-03 ASSESSMENT — LIFESTYLE VARIABLES
EVER_SMOKED: NEVER
TOTAL SCORE: 0
ALCOHOL_USE: YES
HAVE YOU EVER FELT YOU SHOULD CUT DOWN ON YOUR DRINKING: NO
TOTAL SCORE: 0
EVER FELT BAD OR GUILTY ABOUT YOUR DRINKING: NO
HOW MANY TIMES IN THE PAST YEAR HAVE YOU HAD 5 OR MORE DRINKS IN A DAY: 0
EVER_SMOKED: NEVER
EVER HAD A DRINK FIRST THING IN THE MORNING TO STEADY YOUR NERVES TO GET RID OF A HANGOVER: NO
SUBSTANCE_ABUSE: 0
ON A TYPICAL DAY WHEN YOU DRINK ALCOHOL HOW MANY DRINKS DO YOU HAVE: 1
TOTAL SCORE: 0
CONSUMPTION TOTAL: NEGATIVE
AVERAGE NUMBER OF DAYS PER WEEK YOU HAVE A DRINK CONTAINING ALCOHOL: 0
HAVE PEOPLE ANNOYED YOU BY CRITICIZING YOUR DRINKING: NO

## 2018-08-03 ASSESSMENT — COGNITIVE AND FUNCTIONAL STATUS - GENERAL
STANDING UP FROM CHAIR USING ARMS: A LITTLE
SUGGESTED CMS G CODE MODIFIER MOBILITY: CK
TURNING FROM BACK TO SIDE WHILE IN FLAT BAD: A LITTLE
TOILETING: A LITTLE
PERSONAL GROOMING: A LITTLE
CLIMB 3 TO 5 STEPS WITH RAILING: A LOT
HELP NEEDED FOR BATHING: A LITTLE
DAILY ACTIVITIY SCORE: 20
WALKING IN HOSPITAL ROOM: A LITTLE
MOBILITY SCORE: 18
MOVING FROM LYING ON BACK TO SITTING ON SIDE OF FLAT BED: A LITTLE
SUGGESTED CMS G CODE MODIFIER DAILY ACTIVITY: CJ
DRESSING REGULAR LOWER BODY CLOTHING: A LITTLE

## 2018-08-03 ASSESSMENT — ENCOUNTER SYMPTOMS
DIZZINESS: 0
SENSORY CHANGE: 0
MYALGIAS: 0
BLURRED VISION: 0
VOMITING: 0
PALPITATIONS: 0
FOCAL WEAKNESS: 0
SPEECH CHANGE: 1
SHORTNESS OF BREATH: 0
ABDOMINAL PAIN: 0
COUGH: 0
FLANK PAIN: 0
HEARTBURN: 0
CHILLS: 0
BRUISES/BLEEDS EASILY: 0
HALLUCINATIONS: 0
NAUSEA: 0
WEAKNESS: 0
FEVER: 0
HEMOPTYSIS: 0
NERVOUS/ANXIOUS: 1
DOUBLE VISION: 0
DEPRESSION: 1
EYE DISCHARGE: 0

## 2018-08-03 ASSESSMENT — PATIENT HEALTH QUESTIONNAIRE - PHQ9
5. POOR APPETITE OR OVEREATING: MORE THAN HALF THE DAYS
2. FEELING DOWN, DEPRESSED, IRRITABLE, OR HOPELESS: SEVERAL DAYS
8. MOVING OR SPEAKING SO SLOWLY THAT OTHER PEOPLE COULD HAVE NOTICED. OR THE OPPOSITE, BEING SO FIGETY OR RESTLESS THAT YOU HAVE BEEN MOVING AROUND A LOT MORE THAN USUAL: NOT AT ALL
6. FEELING BAD ABOUT YOURSELF - OR THAT YOU ARE A FAILURE OR HAVE LET YOURSELF OR YOUR FAMILY DOWN: NOT AL ALL
9. THOUGHTS THAT YOU WOULD BE BETTER OFF DEAD, OR OF HURTING YOURSELF: NOT AT ALL
1. LITTLE INTEREST OR PLEASURE IN DOING THINGS: SEVERAL DAYS
3. TROUBLE FALLING OR STAYING ASLEEP OR SLEEPING TOO MUCH: SEVERAL DAYS
SUM OF ALL RESPONSES TO PHQ QUESTIONS 1-9: 5
7. TROUBLE CONCENTRATING ON THINGS, SUCH AS READING THE NEWSPAPER OR WATCHING TELEVISION: NOT AT ALL
SUM OF ALL RESPONSES TO PHQ9 QUESTIONS 1 AND 2: 2
4. FEELING TIRED OR HAVING LITTLE ENERGY: NOT AT ALL

## 2018-08-03 ASSESSMENT — COPD QUESTIONNAIRES
COPD SCREENING SCORE: 0
DURING THE PAST 4 WEEKS HOW MUCH DID YOU FEEL SHORT OF BREATH: NONE/LITTLE OF THE TIME
HAVE YOU SMOKED AT LEAST 100 CIGARETTES IN YOUR ENTIRE LIFE: NO/DON'T KNOW
HAVE YOU SMOKED AT LEAST 100 CIGARETTES IN YOUR ENTIRE LIFE: NO/DON'T KNOW
COPD SCREENING SCORE: 0
DO YOU EVER COUGH UP ANY MUCUS OR PHLEGM?: NO/ONLY WITH OCCASIONAL COLDS OR INFECTIONS
DURING THE PAST 4 WEEKS HOW MUCH DID YOU FEEL SHORT OF BREATH: NONE/LITTLE OF THE TIME
IN THE PAST 12 MONTHS DO YOU DO LESS THAN YOU USED TO BECAUSE OF YOUR BREATHING PROBLEMS: DISAGREE/UNSURE
DO YOU EVER COUGH UP ANY MUCUS OR PHLEGM?: NO/ONLY WITH OCCASIONAL COLDS OR INFECTIONS

## 2018-08-03 ASSESSMENT — PAIN SCALES - GENERAL: PAINLEVEL_OUTOF10: 0

## 2018-08-03 NOTE — PROGRESS NOTES
42-year-old male with history of diabetes, thyroid disorder, asthma, COPD, seizure, admitted with suicidal ideations and  Xanax overdose, as well as hyperglycemia, altered mental status.  Placed on legal hold.  Psychiatry evaluated, legal hold extended.  I examined the patient and reviewed H&P note and agree with assessment and plan

## 2018-08-03 NOTE — ED NOTES
Patient on legal hold and educated that we need to take belongings and patient is refusing. ERP at bedside.

## 2018-08-03 NOTE — ASSESSMENT & PLAN NOTE
Poorly controlled; A1c 12.6%. Takes metformin and dulagutide at home  - SSI  - added glipizide  - continue metformin  - Pt refusing insulin and glipizide, wants to discuss it first with his pcp

## 2018-08-03 NOTE — PROGRESS NOTES
2 RN skin check completed with ARACELIS Guerra. No open skin areas noted, callousing on bilateral heels.

## 2018-08-03 NOTE — PROGRESS NOTES
Patient arrived on unit via stretcher and escort. PSA at bedside monitoring for legal hold. No psych note at this time, all belongings locked up, no phone available, and no visitors until clarification from psych is available. IV fluids infusing continuously.

## 2018-08-03 NOTE — DISCHARGE PLANNING
Anticipated Discharge Disposition: Mental Health Facility    Action: Faxed Legal Hold documentation to [facility], patient is not medically clear at this time.  Faxed  [facility] referral    Barriers to Discharge: medical clearance and acceptance    Plan: no current social work needs identified at this time

## 2018-08-03 NOTE — ED NOTES
Pt transported with transport to [facility]. Pt on legal hold. Belongings with transport to be given to receiving RN.

## 2018-08-03 NOTE — ED TRIAGE NOTES
BIB by JUST[facility] because his ex-wife called stating that the patient called saying that he has taken some xanax.    Patient had a low BP and was drowsy on arrival. Transferred to [facility]. ERP at bedside. IV established and fluids running. Patient A+Ox4. 2 liters of fluid running.     BP (!) 86/57   Pulse 72   Temp 36.1 °C (97 °F)   Resp 14   Wt 102.1 kg (225 lb)   SpO2 91%   BMI 31.38 kg/m²

## 2018-08-03 NOTE — H&P
Hospital Medicine History & Physical Note    Date of Service  8/3/2018    Primary Care Physician  Luz Pñea M.D.    Consultants  psych    Code Status  full    Chief Complaint  Mental status change/SI    History of Presenting Illness  42 y.o. male who presented 8/3/2018 with drug overdose and suicidal ideation and mental status change.  This patient presents following a drug overdose he states he took 3 tablets of 3.5 mg of Xanax secondary to feeling anxiety.  EMS arrived on the scene his blood sugars were in the 400s he was found to be slightly somnolent in the emergency department somnolence continued and then improved after he started metabolizing the benzodiazepine.  He has been placed on a legal hold per law-enforcement he made suicidal statements.  He states that he was very tearful during that time he denies any suicidal ideations at this time.  He continues to have some slight slowed speech however mental status is improving.  He has known alleviating or exacerbating factors to her symptoms.    Review of Systems  Review of Systems   Constitutional: Negative for chills and fever.   HENT: Negative for congestion, hearing loss and tinnitus.    Eyes: Negative for blurred vision, double vision and discharge.   Respiratory: Negative for cough, hemoptysis and shortness of breath.    Cardiovascular: Negative for chest pain, palpitations and leg swelling.   Gastrointestinal: Negative for abdominal pain, heartburn, nausea and vomiting.   Genitourinary: Negative for dysuria and flank pain.   Musculoskeletal: Negative for joint pain and myalgias.   Skin: Negative for rash.   Neurological: Positive for speech change. Negative for dizziness, sensory change, focal weakness and weakness.   Endo/Heme/Allergies: Negative for environmental allergies. Does not bruise/bleed easily.   Psychiatric/Behavioral: Positive for depression. Negative for hallucinations and substance abuse. The patient is nervous/anxious.         Past Medical History   has a past medical history of Diabetes; Mononucleosis; and Unspecified disorder of thyroid. He also has no past medical history of ASTHMA; COPD; EMPHYSEMA; or Seizure (HCC).    Surgical History  Reviewed and noncontributory     Family History  Reviewed and noncontributory    Social History   reports that he has never smoked. His smokeless tobacco use includes Chew. He reports that he does not drink alcohol or use drugs.    Allergies  Allergies   Allergen Reactions   • Hydrocodone-Acetaminophen Hives       Medications  Prior to Admission Medications   Prescriptions Last Dose Informant Patient Reported? Taking?   Blood Glucose Monitoring Suppl Kit   No No   Sig: Test blood sugar as recommended by provider. Dx E11.65   Blood Glucose Monitoring Suppl SUPPLIES Misc   No No   Sig: For test strips: Use 1x/day and PRN for signs and symptoms of high or low blood sugar. Dx E11.65   Dulaglutide 0.75 MG/0.5ML Solution Pen-injector   No No   Sig: Inject 0.75 mg as instructed every 7 days.   Lancets Misc   No No   Sig: Use 1x/day and PRN for signs and symptoms of high or low blood sugar. Dx E11.65   aspirin EC (ECOTRIN) 81 MG Tablet Delayed Response   Yes No   Sig: Take 81 mg by mouth every day.   fluoxetine (PROZAC) 20 MG Cap 10/11/2017  Yes No   Sig: Take 40 mg by mouth every day.   guaifenesin-codeine (CHERATUSSIN AC) Solution oral solution   No No   Sig: Take 5 mL by mouth every four hours as needed for Cough.   metformin (GLUCOPHAGE) 1000 MG tablet   No No   Sig: Take 1 Tab by mouth 2 times a day, with meals.   nicotine polacrilex (NICORETTE) 4 MG gum   No No   Sig: Take 4 mg by mouth every hour while awake.   topiramate (TOPAMAX) 50 MG tablet 10/11/2017  Yes No   Sig: Take 50 mg by mouth every day.   trazodone (DESYREL) 100 MG Tab 10/11/2017  Yes No   Sig: Take 100 mg by mouth at bedtime as needed.      Facility-Administered Medications: None       Physical Exam  Blood Pressure: (!) 86/57    Temperature: 36.1 °C (97 °F)   Pulse: 73   Respiration: 16   Pulse Oximetry: 100 %     Physical Exam   Constitutional: He is oriented to person, place, and time. He appears well-developed and well-nourished.   HENT:   Head: Normocephalic and atraumatic.   Eyes: Pupils are equal, round, and reactive to light. Conjunctivae and EOM are normal.   Neck: Normal range of motion. Neck supple. No JVD present.   Cardiovascular: Normal rate, regular rhythm, normal heart sounds and intact distal pulses.    No murmur heard.  Pulmonary/Chest: Effort normal and breath sounds normal. No respiratory distress. He exhibits no tenderness.   Abdominal: Soft. Bowel sounds are normal. He exhibits no distension. There is no tenderness.   Musculoskeletal: Normal range of motion. He exhibits no edema.   Neurological: He is alert and oriented to person, place, and time. No cranial nerve deficit. He exhibits normal muscle tone.   Slowed speech    Skin: Skin is warm and dry. No erythema.   Psychiatric:   Tearful and angry    Nursing note and vitals reviewed.      Laboratory:  Recent Labs      08/03/18   0050   WBC  11.4*   RBC  5.32   HEMOGLOBIN  15.9   HEMATOCRIT  45.9   MCV  86.3   MCH  29.9   MCHC  34.6   RDW  37.0   PLATELETCT  268   MPV  10.5     Recent Labs      08/03/18   0050   SODIUM  130*   POTASSIUM  3.8   CHLORIDE  99   CO2  20   GLUCOSE  457*   BUN  13   CREATININE  0.88   CALCIUM  8.6     Recent Labs      08/03/18   0050   ALTSGPT  14   ASTSGOT  12   ALKPHOSPHAT  98   TBILIRUBIN  0.5   GLUCOSE  457*     Recent Labs      08/03/18   0050   APTT  22.3*   INR  1.43*     Recent Labs      08/03/18   0050   BNPBTYPENAT  3         Lab Results   Component Value Date    TROPONINI <0.01 08/03/2018       Urinalysis:    No results found     Imaging:  DX-CHEST-PORTABLE (1 VIEW)   Final Result      No acute cardiac or pulmonary abnormalities are identified. Lung volumes are low.            Assessment/Plan:  I anticipate this patient is  appropriate for observation status at this time.    * Drug overdose   Assessment & Plan    Mr. Hill was here with a confirmed overdose of T42 - Antiepileptic, sedative-hypnotic, or anti-Parkinsonism drugs; he has no other known overdoses.    Patient admits to overdosing on benzo apparently sucidial at the time per Law enforcement however denies SI to me   Will need close monitoring seems to be metabolizing medication and mental status improving   UDS ordered pending           Hypotension   Assessment & Plan    Likely due to overdose on benzo   Cont to monitor   Improved with IVF        Suicidal ideations   Assessment & Plan    Per law enforcement on a legal hold   IP psych eval         Anxiety   Assessment & Plan    Hx of resume prozac        Tobacco use- (present on admission)   Assessment & Plan    Nicorete gum   Encouraged cessation         Type 2 diabetes mellitus with hyperglycemia, without long-term current use of insulin (HCC)- (present on admission)   Assessment & Plan    Uncontrolled BS's   SSI ordered  Resume home metformin   Check a1c            VTE prophylaxis: heparin

## 2018-08-03 NOTE — ASSESSMENT & PLAN NOTE
Legal hold extended. Had suicidal ideation as per collateral history, he continues to deny it  - psych following  - Recommended inpatient mental health facility, pending placement

## 2018-08-03 NOTE — ED NOTES
Pt appears to be sleeping comfortably on cart, no s/s of distress. Cart low and locked, call light in reach. Will continue to monitor.

## 2018-08-03 NOTE — ED PROVIDER NOTES
ED PROVIDER NOTE     Scribed for Andrade Silva M.D. by Natanael Espinoza. 8/3/2018, 12:53 AM.    CHIEF COMPLAINT  Chief Complaint   Patient presents with   • Suicidal Ideation     Per law enforcement, pt overdosed on medication and made suicidal statements   • Drug Overdose     Per law enforcement, pt told them he took 2 xanax, per EMS pt stated he took 3 xanax 1 mg       HPI    Primary care provider: Luz Peña M.D.  Means of arrival: Ambulance  History obtained from: Patient  History limited by: altered mental status    Tylor Hill is a 42 y.o. male who presents to the ED following a drug overdose. Per patient he took 3 tablets of 0.5 mg of Xanax prior to arrival upon developing a feeling on anxiety. The patient's wife contacted EMS when she found the patient had overdosed secondary to suicidal ideations. EMS reports on scene, the patient was oriented and also found to have a blood glucose of 407. Made suicidal statements to PD on scene and he was placed on a mental health hold. Upon arrival to the ED, he was found to be slightly extremely somnolent and only arousable to physical stimulation. He denies any alcohol use tonight. The patient has history of diabetes, but has not taken any of his prescribed medications for more than a month.    REVIEW OF SYSTEMS  Constitutional: Negative for fever.   Neurological: Positive for somnolence.    Psychiatric/Behavioral: Positive for possible suicidal ideations and overdose.   Further ROS limited 2/2 altered mental status.   C.    PAST MEDICAL HISTORY   has a past medical history of Diabetes; Mononucleosis; and Unspecified disorder of thyroid.    PAST FAMILY HISTORY  Unable to obtain due to altered mental status.     SOCIAL HISTORY  Social History     Social History Main Topics   • Smoking status: Never Smoker   • Smokeless tobacco: Current User     Types: Chew      Comment: chewing tobacco once daily   • Alcohol use No   • Drug use: No   • Sexual activity:  None noted       SURGICAL HISTORY   has a past surgical history that includes other orthopedic surgery.    CURRENT MEDICATIONS  Home Medications     Reviewed by Elin Sen R.N. (Registered Nurse) on 08/03/18 at 0107  Med List Status: Unable to Obtain   Medication Last Dose Status   aspirin EC (ECOTRIN) 81 MG Tablet Delayed Response  Active   Blood Glucose Monitoring Suppl Kit  Active   Blood Glucose Monitoring Suppl SUPPLIES Misc  Active   Dulaglutide 0.75 MG/0.5ML Solution Pen-injector  Active   fluoxetine (PROZAC) 20 MG Cap 10/11/2017 Active   guaifenesin-codeine (CHERATUSSIN AC) Solution oral solution  Active   Lancets Misc  Active   metformin (GLUCOPHAGE) 1000 MG tablet  Active   nicotine polacrilex (NICORETTE) 4 MG gum  Active   topiramate (TOPAMAX) 50 MG tablet 10/11/2017 Active   trazodone (DESYREL) 100 MG Tab 10/11/2017 Active                ALLERGIES  Allergies   Allergen Reactions   • Hydrocodone-Acetaminophen Hives       PHYSICAL EXAM  VITAL SIGNS: BP (!) 86/57   Pulse 72   Temp 36.1 °C (97 °F)   Resp 14   Wt 102.1 kg (225 lb)   SpO2 91%   BMI 31.38 kg/m²    Pulse ox interpretation: On room air, I interpret this pulse ox as abnormal.  Constitutional: Lying on stretcher, minimally responsive.   HEENT: Normocephalic, atraumatic. Posterior pharynx clear, mucous membranes dry.  Eyes:  PERRL 4-3. EOMI. Injected sclera.  Neck: Supple, Full range of motion, nontender.  Chest/Pulmonary: Clear to ausculation bilaterally, no wheezes or rhonchi.  Cardiovascular: Regular rate and rhythm, no murmur.   Abdomen: Soft, nontender, no rebound, guarding, or masses.  Back: No CVA tenderness, nontender midline, no step offs.  Musculoskeletal: No deformity, no edema.  Neuro: GCS of 13. Moving all extremities. No focal symmetrical weakness. Slurred speech.   Psych: minimally responsive, concern for suicide attempt.   Skin: No rashes, warm and dry.    DIAGNOSTIC STUDIES / PROCEDURES    LABS & EKG  Results for orders  placed or performed during the hospital encounter of 08/03/18   CBC w/ Differential   Result Value Ref Range    WBC 11.4 (H) 4.8 - 10.8 K/uL    RBC 5.32 4.70 - 6.10 M/uL    Hemoglobin 15.9 14.0 - 18.0 g/dL    Hematocrit 45.9 42.0 - 52.0 %    MCV 86.3 81.4 - 97.8 fL    MCH 29.9 27.0 - 33.0 pg    MCHC 34.6 33.7 - 35.3 g/dL    RDW 37.0 35.9 - 50.0 fL    Platelet Count 268 164 - 446 K/uL    MPV 10.5 9.0 - 12.9 fL    Nucleated RBC 0.00 /100 WBC    NRBC (Absolute) 0.00 K/uL    Neutrophils-Polys 15.60 (L) 44.00 - 72.00 %    Lymphocytes 72.20 (H) 22.00 - 41.00 %    Monocytes 7.00 0.00 - 13.40 %    Eosinophils 5.20 0.00 - 6.90 %    Basophils 0.00 0.00 - 1.80 %    Neutrophils (Absolute) 1.78 (L) 1.82 - 7.42 K/uL    Lymphs (Absolute) 8.23 (H) 1.00 - 4.80 K/uL    Monos (Absolute) 0.80 0.00 - 0.85 K/uL    Eos (Absolute) 0.59 (H) 0.00 - 0.51 K/uL    Baso (Absolute) 0.00 0.00 - 0.12 K/uL   Complete Metabolic Panel (CMP)   Result Value Ref Range    Sodium 130 (L) 135 - 145 mmol/L    Potassium 3.8 3.6 - 5.5 mmol/L    Chloride 99 96 - 112 mmol/L    Co2 20 20 - 33 mmol/L    Anion Gap 11.0 0.0 - 11.9    Glucose 457 (H) 65 - 99 mg/dL    Bun 13 8 - 22 mg/dL    Creatinine 0.88 0.50 - 1.40 mg/dL    Calcium 8.6 8.5 - 10.5 mg/dL    AST(SGOT) 12 12 - 45 U/L    ALT(SGPT) 14 2 - 50 U/L    Alkaline Phosphatase 98 30 - 99 U/L    Total Bilirubin 0.5 0.1 - 1.5 mg/dL    Albumin 4.1 3.2 - 4.9 g/dL    Total Protein 6.3 6.0 - 8.2 g/dL    Globulin 2.2 1.9 - 3.5 g/dL    A-G Ratio 1.9 g/dL   Btype Natriuretic Peptide (BNP)   Result Value Ref Range    B Natriuretic Peptide 3 0 - 100 pg/mL   Troponin   Result Value Ref Range    Troponin I <0.01 0.00 - 0.04 ng/mL   Prothrombin (PT/INR)   Result Value Ref Range    PT 17.1 (H) 12.0 - 14.6 sec    INR 1.43 (H) 0.87 - 1.13   APTT   Result Value Ref Range    APTT 22.3 (L) 24.7 - 36.0 sec   MAGNESIUM   Result Value Ref Range    Magnesium 1.8 1.5 - 2.5 mg/dL   BETA-HYDROXYBUTYRIC ACID   Result Value Ref Range     beta-Hydroxybutyric Acid 0.27 0.02 - 0.27 mmol/L   SALICYLATE   Result Value Ref Range    Salicylates, Quant. 0 (L) 15 - 25 mg/dL   ACETAMINOPHEN   Result Value Ref Range    Acetaminophen -Tylenol <10 10 - 30 ug/mL   ESTIMATED GFR   Result Value Ref Range    GFR If African American >60 >60 mL/min/1.73 m 2    GFR If Non African American >60 >60 mL/min/1.73 m 2   BLOOD CULTURE,HOLD   Result Value Ref Range    Blood Culture Hold Collected    DIFFERENTIAL MANUAL   Result Value Ref Range    Manual Diff Status PERFORMED    PERIPHERAL SMEAR REVIEW   Result Value Ref Range    Peripheral Smear Review see below    PLATELET ESTIMATE   Result Value Ref Range    Plt Estimation Normal    MORPHOLOGY   Result Value Ref Range    RBC Morphology Present     Smudge Cells Few    TSH (Thyroid Stimulating Hormone)   Result Value Ref Range    TSH 2.580 0.380 - 5.330 uIU/mL   Hemoglobin A1c   Result Value Ref Range    Glycohemoglobin 12.6 (H) 0.0 - 5.6 %    Est Avg Glucose 315 mg/dL   ACCU-CHEK GLUCOSE   Result Value Ref Range    Glucose - Accu-Ck 411 (HH) 65 - 99 mg/dL   ACCU-CHEK GLUCOSE   Result Value Ref Range    Glucose - Accu-Ck 330 (H) 65 - 99 mg/dL   ACCU-CHEK GLUCOSE   Result Value Ref Range    Glucose - Accu-Ck 227 (H) 65 - 99 mg/dL   EKG (NOW)   Result Value Ref Range    Report       [facility] Emergency Dept.    Test Date:  2018  Pt Name:    CHU ACEVEDO             Department: ER  MRN:        [id number]                      Room:       [facility]  Gender:     Male                         Technician: [id number]  :        1975                   Requested By:ANDRADE RIVERA  Order #:    [id number]                    Reading MD: Andrade Rivera MD    Measurements  Intervals                                Axis  Rate:       80                           P:          48  NE:         172                          QRS:        42  QRSD:       86                           T:          38  QT:         416  QTc:         480    Interpretive Statements  SINUS RHYTHM  BORDERLINE PROLONGED QT INTERVAL  Compared to ECG 06/13/2012 15:06:29  ST (T wave) deviation now present  Sinus tachycardia no longer present  T-wave abnormality no longer present  Possible ischemia no longer present  No STEMI  Electronically Signed On 8-3-2018 14:48:55 PDT by Andrade Silva MD     ISTAT ARTERIAL BLOOD GAS   Result Value Ref Range    Ph 7.392 (L) 7.400 - 7.500    Pco2 35.1 26.0 - 37.0 mmHg    Po2 68 64 - 87 mmHg    Tco2 22 20 - 33 mmol/L    S02 93 93 - 99 %    Hco3 21.4 17.0 - 25.0 mmol/L    BE -3 -4 - 3 mmol/L    Body Temp see below degrees    O2 Therapy 21 %    Specimen Arterial     Action Range Triggered NO     Inst. Qualified Patient YES      All labs reviewed by me.    RADIOLOGY  DX-CHEST-PORTABLE (1 VIEW)   Final Result      No acute cardiac or pulmonary abnormalities are identified. Lung volumes are low.        The radiologist's interpretation of all radiological studies have been reviewed by me.      COURSE & MEDICAL DECISION MAKING    This is a 42 y.o. male who presents with altered mental status of the setting of possible overdose of clonazepam, suicidal statement into police officers and placed on a mental health however, on arrival hypotensive and minimally responsive.    Differential Diagnosis includes but is not limited to:  Overdose, intoxication, DKA, occult trauma.    ED Course:  12:47 AM - Called acutely to patient's bedside. He is extremely hypotensisve at 73/44.  He was initially room to a lower acuity zone but an IV was established and I personally moved the patient to a resuscitation room with RT to the bedside with plans for possible intubation.  Airway equipment and ventilator to the bedside, blood sugar elevated to greater than 400.  However, with aggressive physical and verbal stimulation the patient was maintaining a patent airway.  2 large-bore IVs were established and fluids were immediately administered with concern for  "DKA and dehydration.  I remained at the patient's bedside for more than 20 minutes to ensure his airway was patent and did not require emergent airway protection with endotracheal.  I had respiratory therapy draw an arterial blood gas which showed no severe acidosis.  As fluid administered swiftly his mental status began to improve, and thus I will withhold intubation at this time.  We will initiate a workup to include labs, chest x-ray, and EKG done while I was at the bedside showed no obvious STEMI but some mild less than 1 mm elevation in multiple leads without any reciprocal changes.  As he has become more arousable he denies any chest pain, and fortifications only for \"anxiety\".  Reports that he has not been taking his metformin for more than a month.  Given concern for DKA, dehydration, hypotension, or possible occult sepsis he will be treated with 30 cc/kg IV fluid bolus.    1:30 AM -the patient is becoming more arousable and his vitals are improving with IV fluid resuscitation.  Thus I feel he is having a positive response to aggressive crystalloid bolus.  He is protecting his airway.    2:40 AM - Reviewed patient's lab and radiology results as shown above.  Troponin negative doubt acute coronary syndrome.  He has hyperglycemia with acidosis, doubt diabetic ketoacidosis.  We will continue to his hyperglycemia with IV fluids.  CBC shows a mild leukocytosis.  No anemia.  Chest x-ray without acute process doubt pneumonia or volume overload.  Ingestion labs are negative.  Patient was reassessed, he still quite somnolent and I feel may be some time before he ever from this overdose.  Given he is quite hyperglycemic and has not been on maintenance medications for his diabetes with an overdose I feel that he merits admission to the hospital to ensure he does not have any decline in his mental status, respiratory status, and that he get reinitiated on his diabetic regimen before transfer to a psychiatric facility.  " I will maintain his mental health hold, however he is not medically cleared.    2:49 AM - Consult with Dr. Amador, Hospitalist, who agrees to admit the patient.     2:58 AM - Patient reevaluated at bed. He is resting comfortably and hypotension has continued to improve after IV fluids. Patient updated with lab and radiology results that are concerning, especially given his blood glucose level that is over 400. He is made aware he will be admitted for further work up. The patient is understanding and agreeable to plan of care, but still quite somnolent.    Mr. Hill was here with a suspected overdose of T42 - Antiepileptic, sedative-hypnotic, or anti-Parkinsonism drugs; he has no other known overdoses.       Upon my evaluation, this patient had a high probability of imminent or life-threatening deterioration due to overdose, acute encephalopathy, hypotension.     I personally provided 35 minutes of total critical care time outside of time spent on separately billable/documented procedures. This required my direct attention, intervention, and management which included the following:  -review of laboratory data  -review of radiology studies  -discussion with consultant: hospitalist  -discussions with patient  -monitoring for potential decompensation   -Bedside monitoring and preparation for airway protection with intubation  -Aggressive IV fluid rehydration  -ABG interpretation    Medications   senna-docusate (PERICOLACE or SENOKOT S) 8.6-50 MG per tablet 2 Tab (2 Tabs Oral Refused 8/3/18 1125)     And   polyethylene glycol/lytes (MIRALAX) PACKET 1 Packet (not administered)     And   magnesium hydroxide (MILK OF MAGNESIA) suspension 30 mL (not administered)     And   bisacodyl (DULCOLAX) suppository 10 mg (not administered)   Respiratory Care per Protocol (not administered)   NS infusion ( Intravenous New Bag 8/3/18 0617)   heparin injection 5,000 Units (5,000 Units Subcutaneous Missed 8/3/18 0600)   ondansetron  (ZOFRAN) syringe/vial injection 4 mg (not administered)   ondansetron (ZOFRAN ODT) dispertab 4 mg (not administered)   promethazine (PHENERGAN) tablet 12.5-25 mg (not administered)   promethazine (PHENERGAN) suppository 12.5-25 mg (not administered)   prochlorperazine (COMPAZINE) injection 5-10 mg (not administered)   insulin regular (HUMULIN R) injection 2-9 Units (2 Units Subcutaneous Refused 8/3/18 1200)     And   glucose 4 g chewable tablet 16 g (not administered)     And   dextrose 50% (D50W) injection 25 mL (not administered)   aspirin EC (ECOTRIN) tablet 81 mg (81 mg Oral Given 8/3/18 1126)   FLUoxetine (PROZAC) capsule 40 mg (40 mg Oral Given 8/3/18 1124)   metFORMIN (GLUCOPHAGE) tablet 1,000 mg (1,000 mg Oral Given 8/3/18 1126)   nicotine polacrilex (NICORETTE) 2 MG piece 4 mg (not administered)   topiramate (TOPAMAX) tablet 50 mg (50 mg Oral Refused 8/3/18 1126)   NS infusion 3,063 mL (0 mL/kg × 102.1 kg Intravenous Stopped 8/3/18 0309)   NS infusion 1,000 mL (0 mL Intravenous Stopped 8/3/18 0554)     FINAL IMPRESSION  1. Drug overdose, undetermined intent, initial encounter    2. Hyperglycemia    3. Dehydration    4. Acute encephalopathy    5. Type 2 diabetes mellitus with hyperglycemia, without long-term current use of insulin (HCC)    6. Bipolar affective disorder, remission status unspecified (HCC)    7. Other specified hypotension    8. Noncompliance with medication regimen    9. Anxiety          -ADMIT-       Pertinent Labs & Imaging studies reviewed and verified by myself, as well as nursing notes and the patient's past medical, family, and social histories (See chart for details).    Natanael DIAZ (Jose), am scribing for, and in the presence of, Andrade Silva M.D..    Electronically signed by: Natanael Wagner), 8/3/2018    Andrade DIAZ M.D. personally performed the services described in this documentation, as scribed by Natanael Espinoza in my presence, and it is both accurate and  complete.    Portions of this record were made with voice recognition software.  Despite my review, spelling/grammar/context errors may still remain.  Interpretation of this chart should be taken in this context.    The note accurately reflects work and decisions made by me.  Andrade Silva  8/3/2018  2:56 PM

## 2018-08-03 NOTE — ASSESSMENT & PLAN NOTE
Mr. Hill was here with a confirmed overdose of T42 - Antiepileptic, sedative-hypnotic, or anti-Parkinsonism drugs; he has no other known overdoses. Overdose on Xanax but denies overdose intent.  - psych evaluated, greatly appreciate  - pending inpatient psychiatric facility placement  - patient wishes to contest his legal hold, asked SW to follow up on this

## 2018-08-04 PROBLEM — R50.9 FEVER: Status: ACTIVE | Noted: 2018-08-04

## 2018-08-04 LAB
ALBUMIN SERPL BCP-MCNC: 3.7 G/DL (ref 3.2–4.9)
ALBUMIN/GLOB SERPL: 1.8 G/DL
ALP SERPL-CCNC: 59 U/L (ref 30–99)
ALT SERPL-CCNC: 11 U/L (ref 2–50)
ANION GAP SERPL CALC-SCNC: 8 MMOL/L (ref 0–11.9)
AST SERPL-CCNC: 10 U/L (ref 12–45)
BASOPHILS # BLD AUTO: 0 % (ref 0–1.8)
BASOPHILS # BLD: 0 K/UL (ref 0–0.12)
BILIRUB SERPL-MCNC: 0.5 MG/DL (ref 0.1–1.5)
BUN SERPL-MCNC: 8 MG/DL (ref 8–22)
CALCIUM SERPL-MCNC: 8.7 MG/DL (ref 8.5–10.5)
CHLORIDE SERPL-SCNC: 106 MMOL/L (ref 96–112)
CHOLEST SERPL-MCNC: 149 MG/DL (ref 100–199)
CO2 SERPL-SCNC: 23 MMOL/L (ref 20–33)
CREAT SERPL-MCNC: 0.77 MG/DL (ref 0.5–1.4)
EOSINOPHIL # BLD AUTO: 0.25 K/UL (ref 0–0.51)
EOSINOPHIL NFR BLD: 2.6 % (ref 0–6.9)
ERYTHROCYTE [DISTWIDTH] IN BLOOD BY AUTOMATED COUNT: 37.5 FL (ref 35.9–50)
GLOBULIN SER CALC-MCNC: 2.1 G/DL (ref 1.9–3.5)
GLUCOSE SERPL-MCNC: 212 MG/DL (ref 65–99)
HCT VFR BLD AUTO: 43.1 % (ref 42–52)
HDLC SERPL-MCNC: 31 MG/DL
HGB BLD-MCNC: 15.1 G/DL (ref 14–18)
LDLC SERPL CALC-MCNC: 64 MG/DL
LYMPHOCYTES # BLD AUTO: 5 K/UL (ref 1–4.8)
LYMPHOCYTES NFR BLD: 52.6 % (ref 22–41)
MANUAL DIFF BLD: NORMAL
MCH RBC QN AUTO: 30.6 PG (ref 27–33)
MCHC RBC AUTO-ENTMCNC: 35 G/DL (ref 33.7–35.3)
MCV RBC AUTO: 87.4 FL (ref 81.4–97.8)
METAMYELOCYTES NFR BLD MANUAL: 0.9 %
MONOCYTES # BLD AUTO: 0.57 K/UL (ref 0–0.85)
MONOCYTES NFR BLD AUTO: 6 % (ref 0–13.4)
MORPHOLOGY BLD-IMP: NORMAL
NEUTROPHILS # BLD AUTO: 3.6 K/UL (ref 1.82–7.42)
NEUTROPHILS NFR BLD: 37.9 % (ref 44–72)
NRBC # BLD AUTO: 0 K/UL
NRBC BLD-RTO: 0 /100 WBC
PLATELET # BLD AUTO: 224 K/UL (ref 164–446)
PLATELET BLD QL SMEAR: NORMAL
PMV BLD AUTO: 10.4 FL (ref 9–12.9)
POTASSIUM SERPL-SCNC: 3.7 MMOL/L (ref 3.6–5.5)
PROT SERPL-MCNC: 5.8 G/DL (ref 6–8.2)
RBC # BLD AUTO: 4.93 M/UL (ref 4.7–6.1)
RBC BLD AUTO: NORMAL
SODIUM SERPL-SCNC: 137 MMOL/L (ref 135–145)
TRIGL SERPL-MCNC: 271 MG/DL (ref 0–149)
WBC # BLD AUTO: 9.5 K/UL (ref 4.8–10.8)

## 2018-08-04 PROCEDURE — 80061 LIPID PANEL: CPT

## 2018-08-04 PROCEDURE — 700102 HCHG RX REV CODE 250 W/ 637 OVERRIDE(OP): Performed by: HOSPITALIST

## 2018-08-04 PROCEDURE — 85027 COMPLETE CBC AUTOMATED: CPT

## 2018-08-04 PROCEDURE — A9270 NON-COVERED ITEM OR SERVICE: HCPCS | Performed by: HOSPITALIST

## 2018-08-04 PROCEDURE — 80053 COMPREHEN METABOLIC PANEL: CPT

## 2018-08-04 PROCEDURE — 99406 BEHAV CHNG SMOKING 3-10 MIN: CPT | Performed by: INTERNAL MEDICINE

## 2018-08-04 PROCEDURE — 99225 PR SUBSEQUENT OBSERVATION CARE,LEVEL II: CPT | Mod: 25 | Performed by: INTERNAL MEDICINE

## 2018-08-04 PROCEDURE — G0378 HOSPITAL OBSERVATION PER HR: HCPCS

## 2018-08-04 PROCEDURE — 85007 BL SMEAR W/DIFF WBC COUNT: CPT

## 2018-08-04 PROCEDURE — 36415 COLL VENOUS BLD VENIPUNCTURE: CPT

## 2018-08-04 RX ORDER — GLIPIZIDE 5 MG/1
5 TABLET ORAL
Status: DISCONTINUED | OUTPATIENT
Start: 2018-08-04 | End: 2018-08-13 | Stop reason: HOSPADM

## 2018-08-04 RX ORDER — INSULIN GLARGINE 100 [IU]/ML
5 INJECTION, SOLUTION SUBCUTANEOUS EVERY EVENING
Status: DISCONTINUED | OUTPATIENT
Start: 2018-08-04 | End: 2018-08-11

## 2018-08-04 RX ADMIN — NICOTINE POLACRILEX 4 MG: 2 GUM, CHEWING BUCCAL at 16:05

## 2018-08-04 RX ADMIN — NICOTINE POLACRILEX 4 MG: 2 GUM, CHEWING BUCCAL at 17:18

## 2018-08-04 RX ADMIN — METFORMIN HYDROCHLORIDE 1000 MG: 500 TABLET, FILM COATED ORAL at 17:18

## 2018-08-04 RX ADMIN — NICOTINE POLACRILEX 4 MG: 2 GUM, CHEWING BUCCAL at 19:55

## 2018-08-04 RX ADMIN — METFORMIN HYDROCHLORIDE 1000 MG: 500 TABLET, FILM COATED ORAL at 10:02

## 2018-08-04 RX ADMIN — FLUOXETINE HYDROCHLORIDE 40 MG: 20 CAPSULE ORAL at 04:51

## 2018-08-04 RX ADMIN — NICOTINE POLACRILEX 4 MG: 2 GUM, CHEWING BUCCAL at 18:43

## 2018-08-04 RX ADMIN — ASPIRIN 81 MG: 81 TABLET, COATED ORAL at 04:51

## 2018-08-04 RX ADMIN — NICOTINE POLACRILEX 4 MG: 2 GUM, CHEWING BUCCAL at 22:17

## 2018-08-04 ASSESSMENT — ENCOUNTER SYMPTOMS
COUGH: 0
BACK PAIN: 1
HEARTBURN: 0
DIZZINESS: 0
TINGLING: 0
FALLS: 0
ORTHOPNEA: 0
WEIGHT LOSS: 0
MYALGIAS: 0
ABDOMINAL PAIN: 0
BRUISES/BLEEDS EASILY: 0
NAUSEA: 0
DOUBLE VISION: 0
VOMITING: 0
PHOTOPHOBIA: 0
BLURRED VISION: 0
FEVER: 0
CHILLS: 0
HEMOPTYSIS: 0
PALPITATIONS: 0
DEPRESSION: 0
SPUTUM PRODUCTION: 0
HEADACHES: 0

## 2018-08-04 ASSESSMENT — LIFESTYLE VARIABLES: SUBSTANCE_ABUSE: 0

## 2018-08-04 ASSESSMENT — PAIN SCALES - GENERAL
PAINLEVEL_OUTOF10: 0
PAINLEVEL_OUTOF10: 0

## 2018-08-04 NOTE — PROGRESS NOTES
Patient refused blood culture and glipizide this morning, MD Michael notified and asked if lab could attempt blood culture later tonight. Lab called and rescheduled blood culture to 2000. Tmax in last 24hr was 38.4C at 2000 on 8/3, pt has been afebrile since that time.

## 2018-08-04 NOTE — PROGRESS NOTES
Wife at bedside, writer updated wife on events this afternoon and reinforced that all proceedings are within

## 2018-08-04 NOTE — PROGRESS NOTES
[facility] Medicine Daily Progress Note    Date of Service  8/4/2018    Chief Complaint  42 y.o. male with history of bipolar disorder admitted 8/3/2018 with Xanax overdose and suspicion for suicidal behavior      Interval Problem Update  Patient complaining that food is being delivered late.  He has some lower back pain, reportedly from uncomfortable bed  Per psychiatry evaluation, patient did have valid suicidal behavior based on collateral history, despite his denial.  Recommended placement to mental health facility for treatment of depression with suicidality  Patient continued to deny suicidal intention  Jorje of fever was recorded yesterday night 101.2.  Patient denies fever, chills, headache, nausea, vomiting, cough, diarrhea, dysuria, abdominal pain, chest pain.  Denies runny nose sore throat.  Ordered influenza screen, blood culture, urine    Patient is refusing insulin.  States that he needs to talk to his primary doctor before starting insulin or new medications.  Ordered glipizide additionally to metformin.  Consultants/Specialty  Psychiatry    Disposition  Mental health facility when medically clear    Review of Systems  Review of Systems   Constitutional: Negative for chills, fever, malaise/fatigue and weight loss.   HENT: Negative for ear pain, hearing loss and tinnitus.    Eyes: Negative for blurred vision, double vision and photophobia.   Respiratory: Negative for cough, hemoptysis and sputum production.    Cardiovascular: Negative for chest pain, palpitations and orthopnea.   Gastrointestinal: Negative for abdominal pain, heartburn, nausea and vomiting.   Genitourinary: Negative for dysuria, hematuria and urgency.   Musculoskeletal: Positive for back pain. Negative for falls, joint pain and myalgias.   Skin: Negative for itching and rash.   Neurological: Negative for dizziness, tingling and headaches.   Endo/Heme/Allergies: Negative for environmental allergies. Does not bruise/bleed easily.    Psychiatric/Behavioral: Negative for depression and substance abuse.        Physical Exam  Blood Pressure: 120/80   Temperature:  (pt refused)   Pulse: (!) 105   Respiration: 20   Pulse Oximetry: 94 %     Physical Exam    Constitutional: He is oriented to person, place, and time. He appears well-developed and well-nourished.  He is not in acute distress  HENT:   Head: Normocephalic and atraumatic.   Eyes: Pupils are equal, round, and reactive to light. Conjunctivae and EOM are normal.   Neck: Normal range of motion. Neck supple. No JVD present.   Cardiovascular: Normal rate, regular rhythm, normal heart sounds and intact distal pulses.    No murmur heard.  Pulmonary/Chest: Effort normal and breath sounds normal. No respiratory distress. He exhibits no tenderness.   Abdominal: Soft. Bowel sounds are normal. He exhibits no distension. There is no tenderness.   Musculoskeletal: Normal range of motion. He exhibits no edema.   Neurological: He is alert and oriented to person, place, and time. No cranial nerve deficit. He exhibits normal muscle tone.   Skin: Skin is warm and dry. No erythema.   Psychiatric:    Blunted affect  Nursing note and vitals reviewed.        Fluids    Intake/Output Summary (Last 24 hours) at 08/04/18 1113  Last data filed at 08/04/18 0900   Gross per 24 hour   Intake              358 ml   Output                0 ml   Net              358 ml       Laboratory  Recent Labs      08/03/18   0050  08/04/18   0438   WBC  11.4*  9.5   RBC  5.32  4.93   HEMOGLOBIN  15.9  15.1   HEMATOCRIT  45.9  43.1   MCV  86.3  87.4   MCH  29.9  30.6   MCHC  34.6  35.0   RDW  37.0  37.5   PLATELETCT  268  224   MPV  10.5  10.4     Recent Labs      08/03/18   0050  08/04/18   0438   SODIUM  130*  137   POTASSIUM  3.8  3.7   CHLORIDE  99  106   CO2  20  23   GLUCOSE  457*  212*   BUN  13  8   CREATININE  0.88  0.77   CALCIUM  8.6  8.7     Recent Labs      08/03/18   0050   APTT  22.3*   INR  1.43*     Recent Labs       08/03/18   0050   BNPBTYPENAT  3     Recent Labs      08/04/18   0438   TRIGLYCERIDE  271*   HDL  31*   LDL  64       Imaging  DX-CHEST-PORTABLE (1 VIEW)   Final Result      No acute cardiac or pulmonary abnormalities are identified. Lung volumes are low.           Assessment/Plan  * Drug overdose   Assessment & Plan    Mr. Hill was here with a confirmed overdose of T42 - Antiepileptic, sedative-hypnotic, or anti-Parkinsonism drugs; he has no other known overdoses.    Patient admits to overdosing on benzo apparently sucidial at the time per Law enforcement however denies SI to me   Will need close monitoring seems to be metabolizing medication and mental status improving   UDS ordered pending           Fever   Assessment & Plan    Jorje a fever documented in the chart last night.  Patient denies any cough, runny nose, sore throat, abdominal pain, nausea, vomiting, diarrhea, dysuria.  Ordered blood culture, patient refuses  Influenza screen  Chest x-ray on admission without signs of infiltrates, UA pending  Monitor clinically        Hypotension   Assessment & Plan    Resolved        Suicidal ideations   Assessment & Plan    Per law enforcement on a legal hold   Had to evaluate suicidal ideation as per collateral history, continue to deny it  History of bipolar disorder  Psychiatry evaluated, recommended mental health facility treatment        Anxiety   Assessment & Plan    Hx of resume prozac        Tobacco use- (present on admission)   Assessment & Plan    Spent approx 5 mins on Tobacco cessation education . Discussed options of nicotine patch, medical treatment with wellbutrin and chantix. Discussed the benefits of quitting smoking and risks of continued smoking including cardiovascular disease, cancer and COPD.   Code 91990          Type 2 diabetes mellitus with hyperglycemia, without long-term current use of insulin (HCC)- (present on admission)   Assessment & Plan    Uncontrolled BS's ; A1c 12.6  SSI  ordered  Added glipizide  Resume home metformin   Diabetes education ordered

## 2018-08-04 NOTE — PROGRESS NOTES
At the beginning of the shift,after  Assessment,patient wanted to see the paper works about why he is  on legal hold,nurse told patient that ,he cannot read the paper works but instead he can go to medical record when discharged,refused ivf and nurse removed iv access d/t patient stated that it is useless to have it,wanted to leave ama ,educated but refused,informed charge nurse and security came to assist patient .Patient is bed right now and will continue to monitor for elopement risk,1;1 sitter at bedside.

## 2018-08-04 NOTE — PSYCHIATRY
"PSYCHIATRIC CONSULTATION:  Reason for admission: Xanax overdose  Reason for consult: Suicide attempt  Requesting Physician: Dr. Dennis Amador  Supervising Physician: Dr. Marycruz Medina    Legal status: Legal 2000    Chief Complaint: \"I was just having some anxiety.\"    HPI:     Mr. Hill is a 42 year old male with history of Bipolar I disorder who was brought in by EMS after overdosing on at least three 2.5 mg Xanax pills (patient later only admitted to 2 pills to me). Per EMS reports, he made suicidal statements. We are consulted for evaluation of suicide attempt.    Mr. Hill was very somnolent when I first saw him in the morning; he was able to tell me that he wasn't trying to kill himself when he took the Xanax pills. He says that his girlfriend lives in [facility]; he was talking to her over the phone last evening when they had an argument. After the argument, he became very \"depressed\" and \"I was just feeling anxious, so I took Xanax.\" He says his PCP prescribes them for him and he takes it \"when I need them. I don't take them that frequently.\" When asked if he was aware that taking multiple pills can lead to harm, he did not answer. The interview was paused due to his somnolent state and continued in the afternoon. He was later able to say that it was his 11 year old daughter who was visiting his home (she normally lives with his ex-wife) who called EMS, and did not give any further details on the situation. He denied feeling depressed or suicidal; says that he has \"two small kids.\"     He says that he was diagnosed with Bipolar I Disorder by a Dr. Zoe Connell in his early 20's; per chart review he was on Seroquel and Depakote since at least 2007 and then stopped taking them some time ago; he says that he stopped 2 years ago. When asked why, he says, \"I don't need them anymore.\" When asked to clarify, he says \"I wasn't having symptoms anymore\" - I asked what his symptoms were, and he said " "\"Depression.\" He told me that he had been seeing psychiatrist Dr. Rochelle Munoz and she was the one who said it was okay for him to stop. He says that Seroquel and Depakote together made him gain a lot of weight; since his divorce 2 years ago he has lost a lot of weight after coming off those medications. He says, \"After the divorce I questioned if the diagnosis was accurate.\" During many portions of our interview he would refuse to answer things and say things like, \"It's in my chart. I don't feel like talking about it because I don't know you. You're not my doctor.\" At one point he also said, \"I don't trust psychiatrists. 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 Munoz who told me that the patient does indeed have a bipolar diagnosis, but that she does not recall taking him off Seroquel and Depakote; she says, \"It's more likely that he was the one who told me he was going to come off those medications.\" She says that his ex-wife left her an urgent message yesterday informing her that he had overdosed. She also says that she has not seen the patient in over a year, perhaps due to losing his insurance after losing his job. She remembers him as \"profoundly depressed,\" and \"very dependent on his ex-wife.\"    Collateral was also obtained from his ex-wife, Paula Hill. She says that the patient sees their daughters, ages 11 and 16, for two days out of the week. He picked up their 11 year old daughter this week while she happened to be out of town. She received a phone call the day prior to admission from their daughter who sounded very distressed; she reported that the patient slept all day, and then woke up hostile and angry which was very unusual for him. He was also throwing things around the room. He then fell asleep again, and then when he woke up later that evening he took the pills. Ms. Hill herself received a call from him right before midnight saying, \"I'm done with life. I'm " "going to kill myself.\" He also mentioned that he was \"done with women.\" She begged him not to do anything with their daughter in the house. However, he hung up and then called back a minute later, saying simply, \"I took the pills. You need to call [16 year old daughter's name] to come  [11 year old daughter's name].\" She herself called EMS first, and she is currently at the airport to fly home to [place].    Ms. Hill says that she has been concerned about her ex- for a very long time. She says that he has been very depressed ever since their divorce. She notes that he has not been caring for his own health; he is diabetic and his sugars frequently run too high. She also feels that he does not think things through at all; for example he [place] the [place] but didn't plan financially for a way to get back home; their oldest had to pay $100 out of her own pocket to bail them out. About a year ago she went with him to see Dr. Munoz in hopes of having him admitted to an inpatient facility; he was able to convince her not to admit him. She feels that he is currently \"at the lowest of lows and struggling to find himself.\" She admits that she helps him \"much more than I probably should.\"    She confirms that he was diagnosed with Bipolar I Disorder in 1998 and has been on a mood stabilizer for a very long time. She reports symptoms before they were  of him not sleeping for several days at a time and going at \"900 miles per hour.\" He always kept busy, his \"mind wouldn't shut up,\" and he had many \"grand schemes\" he planned out that he never actually executed. In addition he would spend $1800 a month on eating out. She denies knowing of any previous suicide attempts; says he has made suicidal statements before, has never been hospitalized psychiatrically.    Psychiatric Review of Systems:  Depression: Patient denies any symptoms of depression; ex-wife says he has been " "profoundly struggling in maintaining his health, job, etc.  Jyoti: Patient denies; ex-wife describes as above  Anxiety/Panic Attacks: Denies panic attacks; says he has had anxiety for about 3 years; says he has episodes of great anxiety which last up to half an hour and takes Xanax for them  PTSD symptom: Denies  Psychosis: Denies    Medical Review of Systems: All systems reviewed. Only those found to be + are noted below. All others are negative.   Neurological:    TBIs: Denies   SZs: Denies   Strokes: Denies    Psychiatric Examination:   Vitals: Weight/BMI: Body mass index is 33.85 kg/m². Blood pressure 115/96, pulse 82, temperature 37.6 °C (99.6 °F), resp. rate 20, height 1.778 m (5' 10\"), weight 107 kg (235 lb 14.3 oz), SpO2 97 %.   Vitals:    08/03/18 0700 08/03/18 0800 08/03/18 0915 08/03/18 1543   BP:   111/72 115/96   Pulse: 84 78 77 82   Resp: 16 14 20 20   Temp:   36.3 °C (97.3 °F) 37.6 °C (99.6 °F)   SpO2: 95% 96% 97% 97%   Weight:   107 kg (235 lb 14.3 oz)    Height:   1.778 m (5' 10\")      Appearance:  male lying in bed, very somnolent  Behavior: Very guarded, somewhat irritable, vague  Thought Content: Denies SI/HI; no AVH/no delusions (although referred to his girls 11 and 16 as \"small kids\")  Thought Process: Linear for the most part  Speech: Slowed, soft; slurring  Mood: *shrugs his shoulders*  Affect: Flat  Attention/Alertness: Attends well  Orientation/Memory: Grossly intact  Insight/Judgement: Poor/poor    Past Psychiatric Hx:   Diagnosed with Bipolar I Disorder in 1998; was apparently on Seroquel and Depakote for a long time. No history of suicide attempts or hospitalizations.    Family Psychiatric Hx:  Patient refused to answer; mentioned his father and aunt had something.    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 Parents  when he was young. Graduated high school and has enough credits for an associate's degree; went to tech school. 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 Was  to his wife for 25 years before  two years ago; they have two daughters ages 11 and 16. He says he currently lives alone in a condo and is currently a .    Medical Hx:   Past Medical History:   Diagnosis Date   • Diabetes    • Mononucleosis    • Unspecified disorder of thyroid        Medications:  No current facility-administered medications on file prior to encounter.      Current Outpatient Prescriptions on File Prior to Encounter   Medication Sig Dispense Refill   • guaifenesin-codeine (CHERATUSSIN AC) Solution oral solution Take 5 mL by mouth every four hours as needed for Cough. 120 mL 0   • aspirin EC (ECOTRIN) 81 MG Tablet Delayed Response Take 81 mg by mouth every day.     • Dulaglutide 0.75 MG/0.5ML Solution Pen-injector Inject 0.75 mg as instructed every 7 days. 1 PEN 1   • fluoxetine (PROZAC) 20 MG Cap Take 40 mg by mouth every day.     • trazodone (DESYREL) 100 MG Tab Take 100 mg by mouth at bedtime as needed.     • topiramate (TOPAMAX) 50 MG tablet Take 50 mg by mouth every day.     • metformin (GLUCOPHAGE) 1000 MG tablet Take 1 Tab by mouth 2 times a day, with meals. 180 Tab 3   • nicotine polacrilex (NICORETTE) 4 MG gum Take 4 mg by mouth every hour while awake. 270 Each 3       Labs:  Lab Results   Component Value Date/Time    WBC 11.4 (H) 08/03/2018 12:50 AM    RBC 5.32 08/03/2018 12:50 AM    HEMOGLOBIN 15.9 08/03/2018 12:50 AM    HEMATOCRIT 45.9 08/03/2018 12:50 AM    MCV 86.3 08/03/2018 12:50 AM    MCH 29.9 08/03/2018 12:50 AM    MCHC 34.6 08/03/2018 12:50 AM    MPV 10.5  08/03/2018 12:50 AM    NEUTSPOLYS 15.60 (L) 08/03/2018 12:50 AM    LYMPHOCYTES 72.20 (H) 08/03/2018 12:50 AM    MONOCYTES 7.00 08/03/2018 12:50 AM    EOSINOPHILS 5.20 08/03/2018 12:50 AM    BASOPHILS 0.00 08/03/2018 12:50 AM      Lab Results   Component Value Date/Time    SODIUM 130 (L) 08/03/2018 12:50 AM    POTASSIUM 3.8 08/03/2018 12:50 AM    CHLORIDE 99 08/03/2018 12:50 AM    CO2 20 08/03/2018 12:50 AM    GLUCOSE 457 (H) 08/03/2018 12:50 AM    BUN 13 08/03/2018 12:50 AM    CREATININE 0.88 08/03/2018 12:50 AM    BUNCREATRAT 17 06/08/2017 09:22 AM      Lab Results   Component Value Date/Time    ALTSGPT 14 08/03/2018 12:50 AM    ASTSGOT 12 08/03/2018 12:50 AM    ALKPHOSPHAT 98 08/03/2018 12:50 AM    TBILIRUBIN 0.5 08/03/2018 12:50 AM    LIPASE 24 06/13/2012 03:15 PM    ALBUMIN 4.1 08/03/2018 12:50 AM    GLOBULIN 2.2 08/03/2018 12:50 AM    INR 1.43 (H) 08/03/2018 12:50 AM     Lab Results   Component Value Date/Time    PROTHROMBTM 17.1 (H) 08/03/2018 12:50 AM    INR 1.43 (H) 08/03/2018 12:50 AM        ECG: QTc 480 8/3/2018    ASSESSMENT:   1. Bipolar I Disorder, current episode depressed  2. Unspecified anxiety disorder    This is a 42 year old  male with history of Bipolar I disorder who is admitted after a Xanax overdose; he has clearly taken more than 2 or 3 pills as his speech was slurred and he was quite somnolent even into the afternoon. This was indeed a suicide attempt despite his statements to the contrary. Per ex-wife's collateral he has been very depressed for quite some time and has not seen his psychiatrist in a year. He would benefit from inpatient psychiatric hospitalization.    PLAN:  Legal status: extended today    - Start Abilify 5 mg as his QTc is somewhat prolonged; would avoid Seroquel for this reason. Abilify also has a much better metabolic profile than Seroquel and patient may be more willing to take this medication.  - Consider starting mood stabilizer as well in the future  - Admit  to inpatient psychiatry as soon as beds are available    Psychiatry will follow.  Thank you for the consult.

## 2018-08-04 NOTE — CARE PLAN
Problem: Communication  Goal: The ability to communicate needs accurately and effectively will improve    Intervention: Educate patient and significant other/support system about the plan of care, procedures, treatments, medications and allow for questions  Patient refusing medicine/lab draws, MD notified and patient educated on importance of each item but patient continues to refuse.       Problem: Infection  Goal: Will remain free from infection    Intervention: Assess signs and symptoms of infection  Tmax in last 24hr was 38.4C at 2000 on 8/3, blood cultures ordered but patient refused, lab rescheduled blood draw to 2000. No temperature noted since that time.

## 2018-08-04 NOTE — PROGRESS NOTES
"Patient on legal hold, phone in possession per psych note to obtain phone numbers only. Lethargic, able to follow commands, but sleeping off/on all shift. Upon admission to unit, right AC 14g IV was accidentally pulled out. IV fluids infusing to left wrist IV. 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 MD notified.   "

## 2018-08-04 NOTE — CARE PLAN
"Problem: Infection  Goal: Will remain free from infection    Intervention: Assess signs and symptoms of infection  No s/s of infection, educated patient on handwashing and staff to perform hand hygiene when entering/exiting room.       Problem: Bowel/Gastric:  Goal: Normal bowel function is maintained or improved    Intervention: Educate patient and significant other/support system about diet, fluid intake, medications and activity to promote bowel function  Patient refused scheduled stool softeners stating \"I had a bowel movement yesterday\".         "

## 2018-08-04 NOTE — PROGRESS NOTES
"At 1456, patient asked what time it was, then said \"Oh, I'm getting out of the hospital soon.\" Writer asked patient to clarify and patient stated \"I was a , I know that a legal hold only lasts 72 hours. You have until 6pm to get my hospitalist and psych doctor here.\"     Charge RN, , hospitalist notified and psych on call paged. Charge RN and  talked to patient and explained legal hold process and showed paperwork to prove that proper precautions were in place. Patient complained that he wasn't getting food to eat but breakfast and lunch trays were delivered, patient refused lunch tray, and snack was provided by RN prior to this conversation. Patient attempted to leave yesterday on evening shift and security was called.     "

## 2018-08-05 PROCEDURE — 99406 BEHAV CHNG SMOKING 3-10 MIN: CPT | Performed by: INTERNAL MEDICINE

## 2018-08-05 PROCEDURE — G0378 HOSPITAL OBSERVATION PER HR: HCPCS

## 2018-08-05 PROCEDURE — 700102 HCHG RX REV CODE 250 W/ 637 OVERRIDE(OP): Performed by: HOSPITALIST

## 2018-08-05 PROCEDURE — 99225 PR SUBSEQUENT OBSERVATION CARE,LEVEL II: CPT | Mod: 25 | Performed by: INTERNAL MEDICINE

## 2018-08-05 PROCEDURE — A9270 NON-COVERED ITEM OR SERVICE: HCPCS | Performed by: HOSPITALIST

## 2018-08-05 RX ORDER — DIPHENHYDRAMINE HCL 25 MG
50 TABLET ORAL
Status: DISCONTINUED | OUTPATIENT
Start: 2018-08-05 | End: 2018-08-13 | Stop reason: HOSPADM

## 2018-08-05 RX ORDER — DIPHENHYDRAMINE HYDROCHLORIDE 50 MG/ML
50 INJECTION INTRAMUSCULAR; INTRAVENOUS
Status: DISCONTINUED | OUTPATIENT
Start: 2018-08-05 | End: 2018-08-13 | Stop reason: HOSPADM

## 2018-08-05 RX ORDER — HALOPERIDOL 5 MG/ML
5 INJECTION INTRAMUSCULAR
Status: DISCONTINUED | OUTPATIENT
Start: 2018-08-05 | End: 2018-08-13 | Stop reason: HOSPADM

## 2018-08-05 RX ORDER — LORAZEPAM 1 MG/1
2 TABLET ORAL
Status: DISCONTINUED | OUTPATIENT
Start: 2018-08-05 | End: 2018-08-13 | Stop reason: HOSPADM

## 2018-08-05 RX ORDER — HALOPERIDOL 5 MG/1
5 TABLET ORAL
Status: DISCONTINUED | OUTPATIENT
Start: 2018-08-05 | End: 2018-08-13 | Stop reason: HOSPADM

## 2018-08-05 RX ORDER — LORAZEPAM 2 MG/ML
2 INJECTION INTRAMUSCULAR
Status: DISCONTINUED | OUTPATIENT
Start: 2018-08-05 | End: 2018-08-13 | Stop reason: HOSPADM

## 2018-08-05 RX ADMIN — NICOTINE POLACRILEX 4 MG: 2 GUM, CHEWING BUCCAL at 13:07

## 2018-08-05 RX ADMIN — FLUOXETINE HYDROCHLORIDE 40 MG: 20 CAPSULE ORAL at 04:12

## 2018-08-05 RX ADMIN — METFORMIN HYDROCHLORIDE 1000 MG: 500 TABLET, FILM COATED ORAL at 18:13

## 2018-08-05 RX ADMIN — NICOTINE POLACRILEX 4 MG: 2 GUM, CHEWING BUCCAL at 04:12

## 2018-08-05 RX ADMIN — NICOTINE POLACRILEX 4 MG: 2 GUM, CHEWING BUCCAL at 18:35

## 2018-08-05 RX ADMIN — METFORMIN HYDROCHLORIDE 1000 MG: 500 TABLET, FILM COATED ORAL at 08:44

## 2018-08-05 RX ADMIN — NICOTINE POLACRILEX 4 MG: 2 GUM, CHEWING BUCCAL at 08:20

## 2018-08-05 ASSESSMENT — ENCOUNTER SYMPTOMS
BLURRED VISION: 0
ABDOMINAL PAIN: 0
PHOTOPHOBIA: 0
HEARTBURN: 0
DEPRESSION: 0
PALPITATIONS: 0
CHILLS: 0
DIZZINESS: 0
BRUISES/BLEEDS EASILY: 0
VOMITING: 0
SPUTUM PRODUCTION: 0
HEMOPTYSIS: 0
HEADACHES: 0
TINGLING: 0
FALLS: 0
MYALGIAS: 0
BACK PAIN: 1
COUGH: 0
WEIGHT LOSS: 0
NAUSEA: 0
ORTHOPNEA: 0
FEVER: 0
DOUBLE VISION: 0

## 2018-08-05 ASSESSMENT — LIFESTYLE VARIABLES: SUBSTANCE_ABUSE: 0

## 2018-08-05 ASSESSMENT — PAIN SCALES - GENERAL
PAINLEVEL_OUTOF10: 0
PAINLEVEL_OUTOF10: 0

## 2018-08-05 NOTE — CARE PLAN
Problem: Safety  Goal: Will remain free from injury  Outcome: PROGRESSING AS EXPECTED  Patient on Legal Hold. 1:1 sitter in place. Safety precautions in place. Non skid socks on. Bi hourly rounding in place.

## 2018-08-05 NOTE — PSYCHIATRY
"PSYCHIATRIC FOLLOW UP:    Reason for Admission: Uncontrolled diabetes mellitus (HCC)  Suicidal ideation  Legal hold status:  extended  Psychiatric Supervising Attending:   Santiago Maza MD      HPI:    Psychiatry requested to see patient as he reported no one had seen him and he wanted to contest his legal hold. His ex-wife and daughter were in the room when this writer entered. He was agitated and angry, accusing this writer that \"no one has seen me\" and \"I don't belong here - I want to leave.\" He perseverates that Dr. Guzman only spent 10 minutes with him and hadn't spoken to his outpatient psychiatrist, Dr. Munoz (both were untrue based on the amount of detail in Dr. Guzman's 8/3/18 note). He continued to complain and even started to state unkind things about his ex-wife, who was at his bedside when she would correct him that he had indeed had the L2K process and what things were not typically allowed when a patient was on a L2K at this facility.  He angrily states he is not suicidal and does not admit to attempting to overdose on his Xanax or even alluding to why his ex-wife would have been concerned. When informed that should he attempt to leave that he would be escorted back into his room by security, he states \"good, I can take out the security guards\" due to him being a  in the past. This writer excused herself from further conversation with patient as he was escalating in his agitation.     Psychiatric Examination: observed phenomenon:  Vitals: Blood pressure 133/86, pulse 70, temperature 36.3 °C (97.4 °F), resp. rate 16, height 1.778 m (5' 10\"), weight 105.3 kg (232 lb 2.3 oz), SpO2 98 %.  Musculoskeletal: normal psychomotor activity, no tics or unusual mannerisms noted  Appearance: WDWN, appropriate dress and grooming. Behavior is agitated, uncooperative,  Intense eye contact  Thoughts:  Perseverates on why he shouldn't be on a legal hold, makes statements about \"everyone is lying,\" no blocking of " "thought noted, no delusional content noted, not obviously responding to internal stimuli.  Speech: Normal rate and [place], no pressuring of speech noted. Angry tone  Mood: Agitated           Affect: Agitated      SI/HI: denies, no evidence of active SI/HI noted   Attention/Alertness: Alert  Memory: Recent and remote memory grossly intact     Orientation: A&Ox3     Fund of Knowledge: Fair     Insight/Judgement into symptoms: poor / poor  Neurological Testing: Not formally tested    Medical systems reviewed: no pain         Lab results/tests: No results found for this or any previous visit (from the past 24 hour(s)).       Assessment:(acuity level)  # Bipolar disorder, current episode depressed  # Unspecified anxiety disorder          Plan:  1. Continue on current medications (Abilify 5mg PO Qday)  2. Added prn medications for agitation/dangerousness (PO/IM Haldol, Benadryl, and Ativan) due to patient's statements that he would elope and \"take out the security guards.\"  3. Psych team will follow up as appropriate - his legal hold was already extended.     legal hold: was extended 8/3/18 per Dr. Guzman's note  Anticipated F/U: within 48 hours.  Discussed with other provider: Dr. Maza  Will follow            "

## 2018-08-05 NOTE — DISCHARGE PLANNING
Anticipated Discharge Disposition: Mental health facility    Action: Pt has been medically cleared, medical clearance on legal hold completed by MD. LSW faxed legal hold to [facility].    Barriers to Discharge: None    Plan: Pt needs to be evaluated by psych and [facility] for legal hold extension.

## 2018-08-05 NOTE — DIETARY
Nutrition Services:  Brief Update    Poor PO on Nutrition Admit Screen. Pt is currently on a Regular, Diabetic diet and per chart pt PO >50%. Ht: 177.8 cm, Wt: 105.3 kg, BMI 33.31.  Attempted to speak with pt at bedside to provide Diabetes diet education.  Pt states that he has a good appetite and refused teaching and handouts.  Consult RD as needed. RD will re-screen weekly.      RD available PRN.

## 2018-08-05 NOTE — PROGRESS NOTES
Patient refused morning assessment, neuro checks q4h, morning vitals, and blood sugar checks. MD aware.

## 2018-08-05 NOTE — PROGRESS NOTES
"Patient is AOx4, no complaints of pain.  1:1 sitter at bedside.  Pt refusing scheduled glipizide despite education.  States he only takes metformin for his diabetes.  PRN nicotine gum administered per patient request.  Refusing assessment, patient states \"I don't see a need for that.\"  Education provided.  Patient asking to see doctor and stating he would like to go home, will notify MD.  Up self no bed alarm.  Bed locked in lowest position, treaded socks in place, call light within reach.    Entry time 0930 - During rounds, MD made aware that patient is refusing labs, fingersticks, and most medications.  "

## 2018-08-05 NOTE — PROGRESS NOTES
"Patient verbalized to this RN \"Tell your CNA not to bother coming in to my room! I don't like her attitude and I will not respond to her.\" Charge RN notified.  "

## 2018-08-05 NOTE — CARE PLAN
Problem: Safety  Goal: Will remain free from falls  Outcome: PROGRESSING AS EXPECTED  Bed locked in lowest position, treaded socks in place, call light within reach.  No bed alarm, pt is up self.  1:1 safety sitter at bedside at all times.  Pt instructed to call for assistance.    Problem: Psychosocial Needs:  Goal: Level of anxiety will decrease  Outcome: PROGRESSING AS EXPECTED  Patient is anxious and irritable regarding legal hold.  Patient educated on plan on care.  Hospitalist and psychiatrist in to see patient.

## 2018-08-05 NOTE — PROGRESS NOTES
[facility] Medicine Daily Progress Note    Date of Service  8/5/2018    Chief Complaint  42 y.o. male with history of bipolar disorder admitted 8/3/2018 with Xanax overdose and suspicion for suicidal behavior      Interval Problem Update  Patient complaining that food is being delivered late.  He has some lower back pain, reportedly from uncomfortable bed  Per psychiatry evaluation, patient did have valid suicidal behavior based on collateral history, despite his denial.  Recommended placement to mental health facility for treatment of depression with suicidality  Patient continued to deny suicidal intention  Jorje of fever was recorded yesterday night 101.2.  Patient denies fever, chills, headache, nausea, vomiting, cough, diarrhea, dysuria, abdominal pain, chest pain.  Denies runny nose sore throat.  Ordered influenza screen, blood culture, urine    Patient is refusing insulin.  States that he needs to talk to his primary doctor before starting insulin or new medications.  Ordered glipizide additionally to metformin.    8/5  Patient continues being noncompliant with medical treatment.  Does not want to take glipizide on insulin.  Compliance counseling provided.  Vitals stable.  No fever overnight    Patient medically clear at this point for transfer to mental health facility.    Patient requested reevaluation by psychiatry.  Voice message left for psychiatry.    Consultants/Specialty  Psychiatry    Disposition  Mental health facility -     medically cleared    Review of Systems  Review of Systems   Constitutional: Negative for chills, fever, malaise/fatigue and weight loss.   HENT: Negative for ear pain, hearing loss and tinnitus.    Eyes: Negative for blurred vision, double vision and photophobia.   Respiratory: Negative for cough, hemoptysis and sputum production.    Cardiovascular: Negative for chest pain, palpitations and orthopnea.   Gastrointestinal: Negative for abdominal pain, heartburn, nausea and vomiting.    Genitourinary: Negative for dysuria, hematuria and urgency.   Musculoskeletal: Positive for back pain. Negative for falls, joint pain and myalgias.   Skin: Negative for itching and rash.   Neurological: Negative for dizziness, tingling and headaches.   Endo/Heme/Allergies: Negative for environmental allergies. Does not bruise/bleed easily.   Psychiatric/Behavioral: Negative for depression and substance abuse.        Physical Exam  Blood Pressure: 120/80   Temperature:  (pt refused)   Pulse: (!) 105   Respiration: 20   Pulse Oximetry: 94 %     Physical Exam    Constitutional: He is oriented to person, place, and time. He appears well-developed and well-nourished.  He is not in acute distress  HENT:   Head: Normocephalic and atraumatic.   Eyes: Pupils are equal, round, and reactive to light. Conjunctivae and EOM are normal.   Neck: Normal range of motion. Neck supple. No JVD present.   Cardiovascular: Normal rate, regular rhythm, normal heart sounds and intact distal pulses.    No murmur heard.  Pulmonary/Chest: Effort normal and breath sounds normal. No respiratory distress. He exhibits no tenderness.   Abdominal: Soft. Bowel sounds are normal. He exhibits no distension. There is no tenderness.   Musculoskeletal: Normal range of motion. He exhibits no edema.   Neurological: He is alert and oriented to person, place, and time. No cranial nerve deficit. He exhibits normal muscle tone.   Skin: Skin is warm and dry. No erythema.   Psychiatric:    Blunted affect  Nursing note and vitals reviewed.        Fluids    Intake/Output Summary (Last 24 hours) at 08/05/18 1232  Last data filed at 08/05/18 0600   Gross per 24 hour   Intake             1018 ml   Output                0 ml   Net             1018 ml       Laboratory  Recent Labs      08/03/18   0050  08/04/18   0438   WBC  11.4*  9.5   RBC  5.32  4.93   HEMOGLOBIN  15.9  15.1   HEMATOCRIT  45.9  43.1   MCV  86.3  87.4   MCH  29.9  30.6   MCHC  34.6  35.0   RDW  37.0   37.5   PLATELETCT  268  224   MPV  10.5  10.4     Recent Labs      08/03/18   0050  08/04/18   0438   SODIUM  130*  137   POTASSIUM  3.8  3.7   CHLORIDE  99  106   CO2  20  23   GLUCOSE  457*  212*   BUN  13  8   CREATININE  0.88  0.77   CALCIUM  8.6  8.7     Recent Labs      08/03/18   0050   APTT  22.3*   INR  1.43*     Recent Labs      08/03/18   0050   BNPBTYPENAT  3     Recent Labs      08/04/18   0438   TRIGLYCERIDE  271*   HDL  31*   LDL  64       Imaging  DX-CHEST-PORTABLE (1 VIEW)   Final Result      No acute cardiac or pulmonary abnormalities are identified. Lung volumes are low.           Assessment/Plan  * Drug overdose   Assessment & Plan    Mr. Hill was here with a confirmed overdose of T42 - Antiepileptic, sedative-hypnotic, or anti-Parkinsonism drugs; he has no other known overdoses.      resolved        Fever   Assessment & Plan    Jorje a fever without localizing signs -resolved.  Chest x-ray without signs of pneumonia or bronchitis.    Most likely not related to an infection.          Hypotension   Assessment & Plan    Resolved        Suicidal ideations   Assessment & Plan    Per law enforcement on a legal hold   Had to evaluate suicidal ideation as per collateral history, continue to deny it  History of bipolar disorder  Psychiatry evaluated, legal hold extended  Recommended treatment in  mental health facility  Patient medically clear for transfer        Anxiety   Assessment & Plan    Hx of resume prozac        Tobacco use- (present on admission)   Assessment & Plan    Spent approx 3 mins on Tobacco cessation education . Discussed options of nicotine patch, medical treatment with wellbutrin and chantix. Discussed the benefits of quitting smoking and risks of continued smoking including cardiovascular disease, cancer and COPD.   Code 52454          Type 2 diabetes mellitus with hyperglycemia, without long-term current use of insulin (HCC)- (present on admission)   Assessment & Plan     Uncontrolled BS's ; A1c 12.6  SSI ordered  Added glipizide  Resume home metformin   Diabetes education ordered    Pt refusing insulin and glipizide, wants to discuss it first with his pcp  Will dc that for now. Continue metformin

## 2018-08-05 NOTE — PROGRESS NOTES
Patient on Legal Hold. 1:1 sitter in place at all times.    Patient is alert and oriented x 4. Able to make needs known. Refused assessment as endorsed. Denies any pain and discomfort at this time. Patient educated regarding plan of care. Able to ambulate self with steady gait.    Bed locked, in lowest position, treaded socks on. Needs attended. No further needs at this time.

## 2018-08-06 LAB — GLUCOSE BLD-MCNC: 237 MG/DL (ref 65–99)

## 2018-08-06 PROCEDURE — G0378 HOSPITAL OBSERVATION PER HR: HCPCS

## 2018-08-06 PROCEDURE — A9270 NON-COVERED ITEM OR SERVICE: HCPCS | Performed by: INTERNAL MEDICINE

## 2018-08-06 PROCEDURE — 700102 HCHG RX REV CODE 250 W/ 637 OVERRIDE(OP): Performed by: INTERNAL MEDICINE

## 2018-08-06 PROCEDURE — 99406 BEHAV CHNG SMOKING 3-10 MIN: CPT | Performed by: INTERNAL MEDICINE

## 2018-08-06 PROCEDURE — 700102 HCHG RX REV CODE 250 W/ 637 OVERRIDE(OP): Performed by: HOSPITALIST

## 2018-08-06 PROCEDURE — 99225 PR SUBSEQUENT OBSERVATION CARE,LEVEL II: CPT | Mod: 25 | Performed by: INTERNAL MEDICINE

## 2018-08-06 PROCEDURE — A9270 NON-COVERED ITEM OR SERVICE: HCPCS | Performed by: HOSPITALIST

## 2018-08-06 PROCEDURE — 82962 GLUCOSE BLOOD TEST: CPT

## 2018-08-06 RX ORDER — FLUOXETINE HYDROCHLORIDE 20 MG/1
20 CAPSULE ORAL DAILY
Status: DISCONTINUED | OUTPATIENT
Start: 2018-08-07 | End: 2018-08-09

## 2018-08-06 RX ORDER — QUETIAPINE FUMARATE 100 MG/1
100 TABLET, FILM COATED ORAL EVERY EVENING
Status: DISCONTINUED | OUTPATIENT
Start: 2018-08-06 | End: 2018-08-07

## 2018-08-06 RX ORDER — DIVALPROEX SODIUM 250 MG/1
250 TABLET, DELAYED RELEASE ORAL 2 TIMES DAILY
Status: DISCONTINUED | OUTPATIENT
Start: 2018-08-06 | End: 2018-08-07

## 2018-08-06 RX ADMIN — METFORMIN HYDROCHLORIDE 1000 MG: 500 TABLET, FILM COATED ORAL at 17:11

## 2018-08-06 RX ADMIN — NICOTINE POLACRILEX 4 MG: 2 GUM, CHEWING BUCCAL at 03:04

## 2018-08-06 RX ADMIN — NICOTINE POLACRILEX 4 MG: 2 GUM, CHEWING BUCCAL at 19:26

## 2018-08-06 RX ADMIN — NICOTINE POLACRILEX 4 MG: 2 GUM, CHEWING BUCCAL at 14:38

## 2018-08-06 RX ADMIN — NICOTINE POLACRILEX 4 MG: 2 GUM, CHEWING BUCCAL at 17:12

## 2018-08-06 RX ADMIN — GLIPIZIDE 5 MG: 5 TABLET ORAL at 19:26

## 2018-08-06 RX ADMIN — NICOTINE POLACRILEX 4 MG: 2 GUM, CHEWING BUCCAL at 00:35

## 2018-08-06 RX ADMIN — FLUOXETINE HYDROCHLORIDE 40 MG: 20 CAPSULE ORAL at 05:08

## 2018-08-06 RX ADMIN — NICOTINE POLACRILEX 4 MG: 2 GUM, CHEWING BUCCAL at 08:41

## 2018-08-06 RX ADMIN — NICOTINE POLACRILEX 4 MG: 2 GUM, CHEWING BUCCAL at 06:33

## 2018-08-06 RX ADMIN — METFORMIN HYDROCHLORIDE 1000 MG: 500 TABLET, FILM COATED ORAL at 08:41

## 2018-08-06 ASSESSMENT — ENCOUNTER SYMPTOMS
COUGH: 0
NAUSEA: 0
HEADACHES: 0
BACK PAIN: 1
DOUBLE VISION: 0
HEARTBURN: 0
HEMOPTYSIS: 0
VOMITING: 0
ORTHOPNEA: 0
TINGLING: 0
CHILLS: 0
FALLS: 0
MYALGIAS: 0
PHOTOPHOBIA: 0
DEPRESSION: 0
SPUTUM PRODUCTION: 0
PALPITATIONS: 0
BRUISES/BLEEDS EASILY: 0
BLURRED VISION: 0
WEIGHT LOSS: 0
ABDOMINAL PAIN: 0
FEVER: 0
DIZZINESS: 0

## 2018-08-06 ASSESSMENT — PAIN SCALES - GENERAL
PAINLEVEL_OUTOF10: 0
PAINLEVEL_OUTOF10: 0

## 2018-08-06 ASSESSMENT — LIFESTYLE VARIABLES: SUBSTANCE_ABUSE: 0

## 2018-08-06 NOTE — PROGRESS NOTES
"Pt A&Ox4, no c/o pain, requesting only PRN nicotine gum. Pt refusing all VS, most medicine despite education, and blood sugar fingers sticks. Pt verbalizing \"no desire to hurt self or others\" this morning. Only request is to see psych MD/resident as soon as possible; anxious to get out of the hospital.     1:1 sitter in place, fall precautions in place. Encouraged to call for help as needed.       1710 edit: Pt refusing Depakote and Seroquel despite education. Pt states, \"I have been on those before; they don't work and they make me gain weight.\" Compliant with metformin only.   "

## 2018-08-06 NOTE — PSYCHIATRY
"PSYCHIATRIC FOLLOW UP:    Reason for Admission: Overdose  Legal hold status: On legal 2000  Psychiatric Supervising Attending: Dr. Marycruz Medina    ID: 42 year old  male with history of Bipolar I disorder admitted after Xanax overdose.      Subjective:   Mr. Hill was initially genial when he saw me; greeted me and asked how I was doing. He says that he is \"feeling well\" when asked and denies that he's feeling suicidal at all. He began to press then and asked why he was still on a legal hold; I told him what his ex-wife reported to me; upon hearing her version of events he shook his head and said, \"That didn't happen. I didn't call her. Do you want to check my phone?\" He says, \"I only called my older daughter to pick my younger daughter up because I couldn't drive her home after I took the medication.\" He also said that his ex-wife is very \"controlling\" and that \"she's been trying to keep my daughters away from me.\"    I told him that the plan was to admit him to inpatient psychiatry for treatment of his depression. He denies being depressed, saying \"I go on walks every day, I enjoy my dog, I enjoy seeing my daughters; you think that's depressed?\" He continued to say, \"I'm here on a legal hold, I don't want anyone else changing my medications. That's between me and my doctor.\"    He noted that he was not going to take Abilify or any other medication besides what he took at home. He has been refusing insulin because \"I'm a ; if they get wind that I've gotten insulin they'll fire me.\" He says, \"Dr. Munoz doesn't have privileges here, I don't want anyone prescribing anything.\" When I noted that he hadn't seen her in over a year, he says, \"That's because my insurance lapsed. I'm about to get insurance. Every day I'm here I'm losing money and I could lose my job. You keeping me here is going to ruin me.\" He again said, \"You haven't told me why I'm still here. I've got a life I've got " "to get back to. I've got two daughters.\" I once again told him that he intentionally overdosed, and that is why he is here. He says, \"I just had a moment of anxiety, that's all. I wasn't trying to kill myself. I believe that it's a coward's way out.\"     Psychiatric Examination:   Vitals: Weight/BMI: Body mass index is 33.31 kg/m². Blood pressure 133/86, pulse 70, temperature 36.3 °C (97.4 °F), resp. rate 16, height 1.778 m (5' 10\"), weight 105.3 kg (232 lb 2.3 oz), SpO2 98 %.   Vitals:    08/04/18 0400 08/04/18 1623 08/04/18 2000 08/05/18 0358   BP: 120/80 148/84 156/108 133/86   Pulse: (!) 105 89 96 70   Resp: 20 20 18 16   Temp:    36.3 °C (97.4 °F)   SpO2: 94% 92% 98% 98%   Weight:   105.3 kg (232 lb 2.3 oz)    Height:         Appearance:  male dressed in street clothes, sitting in chair  Behavior: Initially genial, then very oppositional, irritable, demanding  Thought Content: No SI/HI, no AVH; continues to deny that he called his ex-wife twice   Thought Process: Linear and goal-directed  Speech: Mildly pressed secondary to irritability/anger  Mood: \"I'm doing great\"  Affect: Irritable  Attention/Alertness: Attends well to interview  Orientation/Memory: Oriented, but does not recall calling his ex-wife to tell her he's going to kill himself  Insight/Judgement: Poor/poor      Medical Systems Reviewed:  All negative      Lab Results/Imaging:  Reviewed.      ASSESSMENT:   1. Bipolar I Disorder, current episode mixed  2. Unspecified anxiety disorder     This is a 42 year old  male with history of Bipolar I disorder who is admitted after a Xanax overdose; this was indeed a suicide attempt despite his statements to the contrary. Per ex-wife's collateral he has been very depressed for quite some time and has not seen his psychiatrist in a year. He would benefit from inpatient psychiatric hospitalization.     PLAN:  Legal status: extended      - DC Abilify as he is not willing to take it  - Will start " Seroquel 100 mg QHS as he has been on this medication before  - Will start Depakote 250 mg BID as he has also been on this medication before  - Decrease fluoxetine to 20 mg as antidepressants can trigger or worsen manic episodes  - Admit to inpatient psychiatry as soon as beds are available     Psychiatry will follow.  Thank you for the consult.

## 2018-08-06 NOTE — PROGRESS NOTES
[facility] Medicine Daily Progress Note    Date of Service  8/6/2018    Chief Complaint  42 y.o. male with history of bipolar disorder admitted 8/3/2018 with Xanax overdose and suspicion for suicidal behavior      Interval Problem Update  Patient complaining that food is being delivered late.  He has some lower back pain, reportedly from uncomfortable bed  Per psychiatry evaluation, patient did have valid suicidal behavior based on collateral history, despite his denial.  Recommended placement to mental health facility for treatment of depression with suicidality  Patient continued to deny suicidal intention  Jorje of fever was recorded yesterday night 101.2.  Patient denies fever, chills, headache, nausea, vomiting, cough, diarrhea, dysuria, abdominal pain, chest pain.  Denies runny nose sore throat.  Ordered influenza screen, blood culture, urine    Patient is refusing insulin.  States that he needs to talk to his primary doctor before starting insulin or new medications.  Ordered glipizide additionally to metformin.    8/5  Patient continues being noncompliant with medical treatment.  Does not want to take glipizide on insulin.  Compliance counseling provided.  Vitals stable.  No fever overnight    Patient medically clear at this point for transfer to mental health facility.    Patient requested reevaluation by psychiatry.  Voice message left for psychiatry.    8/6  Patient feels good.  Denies physical complaints.  Alert oriented ×4  Denies chills, fever, cough, dysuria, abdominal pain, chest pain, headache, nausea, vomiting, dizziness.  Reportedly, tried to leave AMA yesterday, security had to be called  Evaluated by psychiatry yesterday.  Court hearing scheduled for Wednesday    Consultants/Specialty  Psychiatry    Disposition  Mental health facility -     medically cleared    Review of Systems  Review of Systems   Constitutional: Negative for chills, fever, malaise/fatigue and weight loss.   HENT: Negative for  ear pain, hearing loss and tinnitus.    Eyes: Negative for blurred vision, double vision and photophobia.   Respiratory: Negative for cough, hemoptysis and sputum production.    Cardiovascular: Negative for chest pain, palpitations and orthopnea.   Gastrointestinal: Negative for abdominal pain, heartburn, nausea and vomiting.   Genitourinary: Negative for dysuria, hematuria and urgency.   Musculoskeletal: Positive for back pain. Negative for falls, joint pain and myalgias.   Skin: Negative for itching and rash.   Neurological: Negative for dizziness, tingling and headaches.   Endo/Heme/Allergies: Negative for environmental allergies. Does not bruise/bleed easily.   Psychiatric/Behavioral: Negative for depression and substance abuse.        Physical Exam  Blood Pressure: 120/80   Temperature:  (pt refused)   Pulse: (!) 105   Respiration: 20   Pulse Oximetry: 94 %     Physical Exam    Constitutional: He is oriented to person, place, and time. He appears well-developed and well-nourished.  He is not in acute distress  HENT:   Head: Normocephalic and atraumatic.   Eyes: Pupils are equal, round, and reactive to light. Conjunctivae and EOM are normal.   Neck: Normal range of motion. Neck supple. No JVD present.   Cardiovascular: Normal rate, regular rhythm, normal heart sounds and intact distal pulses.    No murmur heard.  Pulmonary/Chest: Effort normal and breath sounds normal. No respiratory distress. He exhibits no tenderness.   Abdominal: Soft. Bowel sounds are normal. He exhibits no distension. There is no tenderness.   Musculoskeletal: Normal range of motion. He exhibits no edema.   Neurological: He is alert and oriented to person, place, and time. No cranial nerve deficit. He exhibits normal muscle tone.   Skin: Skin is warm and dry. No erythema.   Psychiatric:    Blunted affect  Nursing note and vitals reviewed.        Fluids    Intake/Output Summary (Last 24 hours) at 08/06/18 1548  Last data filed at 08/06/18  1400   Gross per 24 hour   Intake              718 ml   Output                0 ml   Net              718 ml       Laboratory  Recent Labs      08/04/18   0438   WBC  9.5   RBC  4.93   HEMOGLOBIN  15.1   HEMATOCRIT  43.1   MCV  87.4   MCH  30.6   MCHC  35.0   RDW  37.5   PLATELETCT  224   MPV  10.4     Recent Labs      08/04/18   0438   SODIUM  137   POTASSIUM  3.7   CHLORIDE  106   CO2  23   GLUCOSE  212*   BUN  8   CREATININE  0.77   CALCIUM  8.7             Recent Labs      08/04/18 0438   TRIGLYCERIDE  271*   HDL  31*   LDL  64       Imaging  DX-CHEST-PORTABLE (1 VIEW)   Final Result      No acute cardiac or pulmonary abnormalities are identified. Lung volumes are low.           Assessment/Plan  * Drug overdose   Assessment & Plan    Mr. Hill was here with a confirmed overdose of T42 - Antiepileptic, sedative-hypnotic, or anti-Parkinsonism drugs; he has no other known overdoses.      resolved        Fever   Assessment & Plan    Jorje a fever without localizing signs -resolved.  Chest x-ray without signs of pneumonia or bronchitis.    Most likely not related to an infection.          Hypotension   Assessment & Plan    Resolved        Suicidal ideations   Assessment & Plan    Per law enforcement on a legal hold   Had to evaluate suicidal ideation as per collateral history, continue to deny it  History of bipolar disorder  Psychiatry evaluated, legal hold extended  Recommended treatment in  mental health facility  Patient medically clear for transfer        Anxiety   Assessment & Plan    Hx of resume prozac        Tobacco use- (present on admission)   Assessment & Plan    Spent approx 3 mins on Tobacco cessation education . Discussed options of nicotine patch, medical treatment with wellbutrin and chantix. Discussed the benefits of quitting smoking and risks of continued smoking including cardiovascular disease, cancer and COPD.   Code 39026          Type 2 diabetes mellitus with hyperglycemia, without  long-term current use of insulin (HCC)- (present on admission)   Assessment & Plan    Uncontrolled BS's ; A1c 12.6  SSI ordered  Added glipizide  Resume home metformin   Diabetes education ordered    Pt refusing insulin and glipizide, wants to discuss it first with his pcp  Will dc that for now. Continue metformin

## 2018-08-06 NOTE — CARE PLAN
Problem: Safety  Goal: Will remain free from injury  Outcome: PROGRESSING AS EXPECTED  Safety precautions in place. Non skid socks on. 1:1 sitter in place.

## 2018-08-06 NOTE — CARE PLAN
Problem: Communication  Goal: The ability to communicate needs accurately and effectively will improve  Outcome: PROGRESSING SLOWER THAN EXPECTED  Pt expressing disappointment with extension of legal hold; yelling, then becoming withdrawn. Unable to communicate effectively to come to mutually satisfactory solution at this time.     Problem: Safety  Goal: Will remain free from injury  Outcome: PROGRESSING AS EXPECTED  1:1 sitter in place. Pt free from injury to self or others at this time.

## 2018-08-06 NOTE — PROGRESS NOTES
Patient refused VS check, assessment and PM meds. Patient noted to be irritable.     On legal hold. 1:1 sitter in place at all times.    Denies any pain and discomfort at this time. Patient educated regarding plan of care. Able to ambulate self with steady gait.    Bed locked, in lowest position, treaded socks on. Needs attended. No further needs at this time. Communication board updated.

## 2018-08-07 LAB
GLUCOSE BLD-MCNC: 118 MG/DL (ref 65–99)
GLUCOSE BLD-MCNC: 177 MG/DL (ref 65–99)
GLUCOSE BLD-MCNC: 223 MG/DL (ref 65–99)
GLUCOSE BLD-MCNC: 289 MG/DL (ref 65–99)

## 2018-08-07 PROCEDURE — A9270 NON-COVERED ITEM OR SERVICE: HCPCS | Performed by: STUDENT IN AN ORGANIZED HEALTH CARE EDUCATION/TRAINING PROGRAM

## 2018-08-07 PROCEDURE — 99225 PR SUBSEQUENT OBSERVATION CARE,LEVEL II: CPT | Performed by: FAMILY MEDICINE

## 2018-08-07 PROCEDURE — G0378 HOSPITAL OBSERVATION PER HR: HCPCS

## 2018-08-07 PROCEDURE — 700102 HCHG RX REV CODE 250 W/ 637 OVERRIDE(OP): Performed by: HOSPITALIST

## 2018-08-07 PROCEDURE — 700102 HCHG RX REV CODE 250 W/ 637 OVERRIDE(OP): Performed by: STUDENT IN AN ORGANIZED HEALTH CARE EDUCATION/TRAINING PROGRAM

## 2018-08-07 PROCEDURE — 82962 GLUCOSE BLOOD TEST: CPT

## 2018-08-07 PROCEDURE — A9270 NON-COVERED ITEM OR SERVICE: HCPCS | Performed by: HOSPITALIST

## 2018-08-07 RX ORDER — CARBAMAZEPINE 100 MG/1
100 TABLET, CHEWABLE ORAL 2 TIMES DAILY
Status: DISCONTINUED | OUTPATIENT
Start: 2018-08-07 | End: 2018-08-09

## 2018-08-07 RX ADMIN — METFORMIN HYDROCHLORIDE 1000 MG: 500 TABLET, FILM COATED ORAL at 18:07

## 2018-08-07 RX ADMIN — NICOTINE POLACRILEX 4 MG: 2 GUM, CHEWING BUCCAL at 05:07

## 2018-08-07 RX ADMIN — DIVALPROEX SODIUM 250 MG: 250 TABLET, DELAYED RELEASE ORAL at 05:08

## 2018-08-07 RX ADMIN — NICOTINE POLACRILEX 4 MG: 2 GUM, CHEWING BUCCAL at 14:05

## 2018-08-07 RX ADMIN — METFORMIN HYDROCHLORIDE 1000 MG: 500 TABLET, FILM COATED ORAL at 08:22

## 2018-08-07 RX ADMIN — FLUOXETINE HYDROCHLORIDE 20 MG: 20 CAPSULE ORAL at 05:07

## 2018-08-07 RX ADMIN — NICOTINE POLACRILEX 4 MG: 2 GUM, CHEWING BUCCAL at 06:27

## 2018-08-07 RX ADMIN — ASPIRIN 81 MG: 81 TABLET, COATED ORAL at 05:08

## 2018-08-07 RX ADMIN — CARBAMAZEPINE 100 MG: 100 TABLET, CHEWABLE ORAL at 18:27

## 2018-08-07 ASSESSMENT — ENCOUNTER SYMPTOMS
HEADACHES: 0
NAUSEA: 0
CLAUDICATION: 0
WEIGHT LOSS: 0
ORTHOPNEA: 0
ABDOMINAL PAIN: 0
DIZZINESS: 0
BLURRED VISION: 0
SPUTUM PRODUCTION: 0
VOMITING: 0
FALLS: 0
BACK PAIN: 1
FEVER: 0
BRUISES/BLEEDS EASILY: 0
HEARTBURN: 0
TINGLING: 0
PHOTOPHOBIA: 0
COUGH: 0
MYALGIAS: 0
CHILLS: 0
HEMOPTYSIS: 0
DOUBLE VISION: 0
PALPITATIONS: 0
DEPRESSION: 1

## 2018-08-07 ASSESSMENT — LIFESTYLE VARIABLES: SUBSTANCE_ABUSE: 0

## 2018-08-07 ASSESSMENT — PAIN SCALES - GENERAL: PAINLEVEL_OUTOF10: 0

## 2018-08-07 NOTE — PROGRESS NOTES
Diabetes education: Pt seen this evening. Please see consult note. Pt did say he stopped the Trulicity when he lost his insurance in March.  Plan: CDE will follow up tomorrow and when and if appropriate give Accuc[employer]ck guide meter ( or send with him if he is transferred to [facility]. Please call [phone] if needs change.

## 2018-08-07 NOTE — CARE PLAN
Problem: Safety  Goal: Will remain free from injury  Outcome: PROGRESSING AS EXPECTED  Safety teaching reinforced.  Goal being met

## 2018-08-07 NOTE — CARE PLAN
Problem: Communication  Goal: The ability to communicate needs accurately and effectively will improve  Outcome: PROGRESSING AS EXPECTED  Pt diabetic, pt has received diabetic educated during this stay at hospital. Pt will need further education.     Problem: Safety  Goal: Will remain free from injury  Outcome: PROGRESSING AS EXPECTED  Pt up self, pts gait steady while ambulating.

## 2018-08-07 NOTE — CONSULTS
"Diabetes education: Met with Tylor river from previous education ( different facility). Pt agreeable to discussion.  Discussed need to know what blood sugars are ( lab draw or finger stick) as well as need for other medications in addition to Metformin. 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 Explained insulin was of hospital only and if he agreed to take Glipizide he may not need much insulin. Pt agreed to do finger sticks with \"Harini RN\" and then agreed to take Glipizide in AM and Regular insulin now. CDE left RN to medications but now noted he still refused the regular insulin even though his blood sugars were 237 and he would have only needed 3 units. He did agree to take the Glipizide (even though it was due in AM).  Pt states he is fearful of loosing his job and house.  He states he needs more food but limits his carbs ( less than scheduled ?). Discussed need for carbs and that he can have a snack between meals and hs ( he said he just found that out).  Pt states he had a meter and was testing blood sugars but lost his insurance in March ( due to get it again in September if he keeps his job). Discussed [employer]'s reli-on or Accucheck guide's meter with coupon. Pt would like an Accucheck Guide meter. CDE will give meter when determined if it can be given directly or at discharge with supplies.  Plan: CDE will follow up with pt tomorrow. Please call [phone] if needs change.  "

## 2018-08-07 NOTE — PROGRESS NOTES
Hospital Medicine Daily Progress Note    Date of Service  8/7/2018    Chief Complaint  42 y.o. male with history of bipolar disorder admitted 8/3/2018 with Xanax overdose and suspicion for suicidal behavior      Interval Problem Update  Patient feeling okay.  Think that he does not need to be in the hospital.  No new issues to report.    Consultants/Specialty  Psychiatry    Disposition  [facility] health facility -     medically cleared    Review of Systems  Review of Systems   Constitutional: Negative for chills, fever and weight loss.   HENT: Negative for ear discharge, ear pain, hearing loss and tinnitus.    Eyes: Negative for blurred vision, double vision and photophobia.   Respiratory: Negative for cough, hemoptysis and sputum production.    Cardiovascular: Negative for chest pain, palpitations, orthopnea and claudication.   Gastrointestinal: Negative for abdominal pain, heartburn, nausea and vomiting.   Genitourinary: Negative for dysuria, hematuria and urgency.   Musculoskeletal: Positive for back pain. Negative for falls, joint pain and myalgias.   Skin: Negative for rash.   Neurological: Negative for dizziness, tingling and headaches.   Endo/Heme/Allergies: Negative for environmental allergies. Does not bruise/bleed easily.   Psychiatric/Behavioral: Positive for depression and suicidal ideas. Negative for substance abuse.        Physical Exam  Blood Pressure: 120/80   Temperature:  (pt refused)   Pulse: (!) 105   Respiration: 20   Pulse Oximetry: 94 %     Physical Exam   Constitutional: He is oriented to person, place, and time. He appears well-developed. No distress.   HENT:   Head: Normocephalic and atraumatic.   Mouth/Throat: No oropharyngeal exudate.   Eyes: Pupils are equal, round, and reactive to light. Conjunctivae are normal. No scleral icterus.   Neck: Neck supple. No JVD present. No tracheal deviation present.   Cardiovascular: Normal rate and regular rhythm.  Exam reveals no gallop and no friction  rub.    Pulmonary/Chest: Effort normal and breath sounds normal. No respiratory distress. He has no wheezes.   Abdominal: Bowel sounds are normal. He exhibits no distension. There is no tenderness.   Musculoskeletal: He exhibits no tenderness or deformity.   Neurological: He is alert and oriented to person, place, and time.   Skin: Skin is dry. No erythema.   Psychiatric: He is withdrawn.             Fluids    Intake/Output Summary (Last 24 hours) at 08/07/18 1545  Last data filed at 08/07/18 0942   Gross per 24 hour   Intake              890 ml   Output                0 ml   Net              890 ml       Laboratory                        Imaging  DX-CHEST-PORTABLE (1 VIEW)   Final Result      No acute cardiac or pulmonary abnormalities are identified. Lung volumes are low.           Assessment/Plan  * Drug overdose   Assessment & Plan    Mr. Hill was here with a confirmed overdose of T42 - Antiepileptic, sedative-hypnotic, or anti-Parkinsonism drugs; he has no other known overdoses.      resolved        Fever   Assessment & Plan    afebrile over the last 24 hours.  Likely not related to an infection.        Hypotension   Assessment & Plan    Resolved        Suicidal ideations   Assessment & Plan    Per law enforcement on a legal hold   Had to evaluate suicidal ideation as per collateral history, continue to deny it  History of bipolar disorder  Psychiatry evaluated, legal hold extended  Recommended treatment in  mental health facility  Patient medically clear for transfer  Pending placement.        Anxiety   Assessment & Plan    Hx of resume prozac        Tobacco use- (present on admission)   Assessment & Plan    Patient was counseled.  Nicotine patch available as needed.          Type 2 diabetes mellitus with hyperglycemia, without long-term current use of insulin (HCC)- (present on admission)   Assessment & Plan    Poorly controlled; A1c 12.6  SSI ordered  Added glipizide  Resume home metformin   Diabetes  education provided.    Pt refusing insulin and glipizide, wants to discuss it first with his pcp  Will dc that for now. Continue metformin        Plan of care discussed with multidisciplinary team during rounds.

## 2018-08-07 NOTE — PROGRESS NOTES
Paged Psych per pts ex wifes request. Ex wife would like to speak with psych.    Ex wifes number [phone], Psych is aware.

## 2018-08-07 NOTE — DISCHARGE PLANNING
CCA received response from Kelly @[facility], patient is declined at this time.     JOE Almendarez has been notified.

## 2018-08-07 NOTE — DISCHARGE PLANNING
CCA has sent Behavioral Referral to : [facility] and [facility] @ [phone]

## 2018-08-07 NOTE — DISCHARGE PLANNING
Referral: Isabelle from [facility] calling regarding Pt insurance.    Intervention: Pt does not have insurance at this time , Alvaro Aceves has declined.    Plan: Pt will be on waiting list for [facility], due to Legal Hold. SW updated floor RENETTA Almendarez regarding [facility] declining Pt.

## 2018-08-08 PROBLEM — F31.9 BIPOLAR DISORDER (HCC): Chronic | Status: ACTIVE | Noted: 2018-08-08

## 2018-08-08 LAB
ALBUMIN SERPL BCP-MCNC: 4.8 G/DL (ref 3.2–4.9)
ALBUMIN/GLOB SERPL: 1.8 G/DL
ALP SERPL-CCNC: 78 U/L (ref 30–99)
ALT SERPL-CCNC: 16 U/L (ref 2–50)
ANION GAP SERPL CALC-SCNC: 12 MMOL/L (ref 0–11.9)
AST SERPL-CCNC: 13 U/L (ref 12–45)
BASOPHILS # BLD AUTO: 0.7 % (ref 0–1.8)
BASOPHILS # BLD: 0.07 K/UL (ref 0–0.12)
BILIRUB SERPL-MCNC: 0.7 MG/DL (ref 0.1–1.5)
BUN SERPL-MCNC: 12 MG/DL (ref 8–22)
CALCIUM SERPL-MCNC: 9.6 MG/DL (ref 8.5–10.5)
CHLORIDE SERPL-SCNC: 100 MMOL/L (ref 96–112)
CO2 SERPL-SCNC: 20 MMOL/L (ref 20–33)
CREAT SERPL-MCNC: 0.66 MG/DL (ref 0.5–1.4)
EOSINOPHIL # BLD AUTO: 0.16 K/UL (ref 0–0.51)
EOSINOPHIL NFR BLD: 1.7 % (ref 0–6.9)
ERYTHROCYTE [DISTWIDTH] IN BLOOD BY AUTOMATED COUNT: 35.6 FL (ref 35.9–50)
GLOBULIN SER CALC-MCNC: 2.7 G/DL (ref 1.9–3.5)
GLUCOSE BLD-MCNC: 165 MG/DL (ref 65–99)
GLUCOSE BLD-MCNC: 264 MG/DL (ref 65–99)
GLUCOSE SERPL-MCNC: 239 MG/DL (ref 65–99)
HCT VFR BLD AUTO: 48.8 % (ref 42–52)
HGB BLD-MCNC: 17.4 G/DL (ref 14–18)
IMM GRANULOCYTES # BLD AUTO: 0.03 K/UL (ref 0–0.11)
IMM GRANULOCYTES NFR BLD AUTO: 0.3 % (ref 0–0.9)
LYMPHOCYTES # BLD AUTO: 3.72 K/UL (ref 1–4.8)
LYMPHOCYTES NFR BLD: 38.9 % (ref 22–41)
MCH RBC QN AUTO: 30.3 PG (ref 27–33)
MCHC RBC AUTO-ENTMCNC: 35.7 G/DL (ref 33.7–35.3)
MCV RBC AUTO: 84.9 FL (ref 81.4–97.8)
MONOCYTES # BLD AUTO: 0.9 K/UL (ref 0–0.85)
MONOCYTES NFR BLD AUTO: 9.4 % (ref 0–13.4)
NEUTROPHILS # BLD AUTO: 4.68 K/UL (ref 1.82–7.42)
NEUTROPHILS NFR BLD: 49 % (ref 44–72)
NRBC # BLD AUTO: 0 K/UL
NRBC BLD-RTO: 0 /100 WBC
PLATELET # BLD AUTO: 304 K/UL (ref 164–446)
PMV BLD AUTO: 10.3 FL (ref 9–12.9)
POTASSIUM SERPL-SCNC: 3.8 MMOL/L (ref 3.6–5.5)
PROT SERPL-MCNC: 7.5 G/DL (ref 6–8.2)
RBC # BLD AUTO: 5.75 M/UL (ref 4.7–6.1)
SODIUM SERPL-SCNC: 132 MMOL/L (ref 135–145)
WBC # BLD AUTO: 9.6 K/UL (ref 4.8–10.8)

## 2018-08-08 PROCEDURE — 82962 GLUCOSE BLOOD TEST: CPT

## 2018-08-08 PROCEDURE — 99225 PR SUBSEQUENT OBSERVATION CARE,LEVEL II: CPT | Performed by: FAMILY MEDICINE

## 2018-08-08 PROCEDURE — 80053 COMPREHEN METABOLIC PANEL: CPT

## 2018-08-08 PROCEDURE — A9270 NON-COVERED ITEM OR SERVICE: HCPCS | Performed by: HOSPITALIST

## 2018-08-08 PROCEDURE — 700102 HCHG RX REV CODE 250 W/ 637 OVERRIDE(OP): Performed by: HOSPITALIST

## 2018-08-08 PROCEDURE — 36415 COLL VENOUS BLD VENIPUNCTURE: CPT

## 2018-08-08 PROCEDURE — G0378 HOSPITAL OBSERVATION PER HR: HCPCS

## 2018-08-08 PROCEDURE — 85025 COMPLETE CBC W/AUTO DIFF WBC: CPT

## 2018-08-08 PROCEDURE — A9270 NON-COVERED ITEM OR SERVICE: HCPCS | Performed by: INTERNAL MEDICINE

## 2018-08-08 PROCEDURE — 700102 HCHG RX REV CODE 250 W/ 637 OVERRIDE(OP): Performed by: INTERNAL MEDICINE

## 2018-08-08 PROCEDURE — A9270 NON-COVERED ITEM OR SERVICE: HCPCS | Performed by: STUDENT IN AN ORGANIZED HEALTH CARE EDUCATION/TRAINING PROGRAM

## 2018-08-08 PROCEDURE — 700102 HCHG RX REV CODE 250 W/ 637 OVERRIDE(OP): Performed by: STUDENT IN AN ORGANIZED HEALTH CARE EDUCATION/TRAINING PROGRAM

## 2018-08-08 RX ADMIN — NICOTINE POLACRILEX 4 MG: 2 GUM, CHEWING BUCCAL at 02:11

## 2018-08-08 RX ADMIN — GLIPIZIDE 5 MG: 5 TABLET ORAL at 06:22

## 2018-08-08 RX ADMIN — CARBAMAZEPINE 100 MG: 100 TABLET, CHEWABLE ORAL at 06:22

## 2018-08-08 RX ADMIN — METFORMIN HYDROCHLORIDE 1000 MG: 500 TABLET, FILM COATED ORAL at 18:13

## 2018-08-08 RX ADMIN — NICOTINE POLACRILEX 4 MG: 2 GUM, CHEWING BUCCAL at 06:31

## 2018-08-08 RX ADMIN — ASPIRIN 81 MG: 81 TABLET, COATED ORAL at 06:22

## 2018-08-08 RX ADMIN — NICOTINE POLACRILEX 4 MG: 2 GUM, CHEWING BUCCAL at 11:42

## 2018-08-08 RX ADMIN — FLUOXETINE HYDROCHLORIDE 20 MG: 20 CAPSULE ORAL at 06:22

## 2018-08-08 RX ADMIN — CARBAMAZEPINE 100 MG: 100 TABLET, CHEWABLE ORAL at 18:13

## 2018-08-08 RX ADMIN — METFORMIN HYDROCHLORIDE 1000 MG: 500 TABLET, FILM COATED ORAL at 08:55

## 2018-08-08 RX ADMIN — NICOTINE POLACRILEX 4 MG: 2 GUM, CHEWING BUCCAL at 22:12

## 2018-08-08 ASSESSMENT — PAIN SCALES - GENERAL: PAINLEVEL_OUTOF10: 0

## 2018-08-08 NOTE — PROGRESS NOTES
Diabetes education: Attempted to meet with patient this afternoon, but he was busy with another discipline. Pt continues to refuse Lantus, refused Glipizide this am, refusing regular insulin sliding scale but is allowing finger sticks: 223, 177, and 118 (at 0200).  Plan : CDE will attempt to meet with him in am and discuss need for Glipizide. Please change finger sticks to ac and hs from every six hours. Please call [phone] if needs change.

## 2018-08-08 NOTE — CARE PLAN
Problem: Communication  Goal: The ability to communicate needs accurately and effectively will improve  Outcome: PROGRESSING AS EXPECTED  Educate the pt on the use of call light to call for assistance    Problem: Safety  Goal: Will remain free from injury  Outcome: PROGRESSING AS EXPECTED  1:1 Sitter will remains with the pt

## 2018-08-08 NOTE — PROGRESS NOTES
Pt had a great night. No acute event. No complain of pain or discomfort. Pt was pleasant. Refused his insulin and heparin. 1:1 sitter present in the room. Remains on legal hold per order. Will continue to monitor

## 2018-08-08 NOTE — PROGRESS NOTES
Assumed care of pt 0700. Received bedside report from nightshift RN. Pt is A&O X4. Pt is on a legal hold, sitter at bedside. Pt denies any needs at this time. Treaded slipper socks in use. Call light is within reach. Bed is locked and in the lowest position. Hourly rounding in place.

## 2018-08-08 NOTE — CARE PLAN
Problem: Communication  Goal: The ability to communicate needs accurately and effectively will improve  Outcome: PROGRESSING AS EXPECTED  Pt verbalizing feelings today, upset with the legal hold. Requesting to speak to psych, RN paged psych.     Problem: Safety  Goal: Will remain free from injury  Outcome: PROGRESSING AS EXPECTED  Pt has sitter at bedside. RN checking in on pt frequently to assess mood and needs.

## 2018-08-08 NOTE — PROGRESS NOTES
Hospital Medicine Daily Progress Note    Date of Service  8/8/2018    Chief Complaint  42 y.o. male with history of bipolar disorder admitted 8/3/2018 with Xanax overdose and suspicion for suicidal behavior      Interval Problem Update  Stating he does not need to be here.  No new issues to report    Consultants/Specialty  Psychiatry    Disposition  Mental health facility -     medically cleared    Review of Systems  Review of Systems   Unable to perform ROS: Psychiatric disorder        Physical Exam  Blood Pressure: 120/80   Temperature:  (pt refused)   Pulse: (!) 105   Respiration: 20   Pulse Oximetry: 94 %     Physical Exam   Constitutional: He is oriented to person, place, and time. He appears well-developed. No distress.   HENT:   Head: Normocephalic and atraumatic.   Mouth/Throat: No oropharyngeal exudate.   Eyes: Pupils are equal, round, and reactive to light. Conjunctivae are normal. No scleral icterus.   Neck: Neck supple. No JVD present. No tracheal deviation present.   Cardiovascular: Normal rate and regular rhythm.  Exam reveals no gallop and no friction rub.    Pulmonary/Chest: Effort normal and breath sounds normal. No respiratory distress. He has no wheezes.   Abdominal: Bowel sounds are normal. He exhibits no distension. There is no tenderness. There is no rebound.   Musculoskeletal: He exhibits no tenderness or deformity.   Neurological: He is alert and oriented to person, place, and time.   Skin: Skin is dry. He is not diaphoretic. No erythema.   Psychiatric: His affect is labile. He is withdrawn.             Fluids    Intake/Output Summary (Last 24 hours) at 08/08/18 1448  Last data filed at 08/08/18 1100   Gross per 24 hour   Intake              300 ml   Output                0 ml   Net              300 ml       Laboratory  Recent Labs      08/08/18   0853   WBC  9.6   RBC  5.75   HEMOGLOBIN  17.4   HEMATOCRIT  48.8   MCV  84.9   MCH  30.3   MCHC  35.7*   RDW  35.6*   PLATELETCT  304   MPV  10.3      Recent Labs      08/08/18   0853   SODIUM  132*   POTASSIUM  3.8   CHLORIDE  100   CO2  20   GLUCOSE  239*   BUN  12   CREATININE  0.66   CALCIUM  9.6                   Imaging  DX-CHEST-PORTABLE (1 VIEW)   Final Result      No acute cardiac or pulmonary abnormalities are identified. Lung volumes are low.           Assessment/Plan  * Drug overdose   Assessment & Plan    Mr. Hill was here with a confirmed overdose of T42 - Antiepileptic, sedative-hypnotic, or anti-Parkinsonism drugs; he has no other known overdoses.    Overdose on Xanax  Resolved  Psychiatry following.  Pending inpatient psychiatric facility placement.        Bipolar disorder (HCC)- (present on admission)   Assessment & Plan    Continue Prozac and Tegretol.  Psychiatry following.        Fever   Assessment & Plan    afebrile over the last 24 hours.  Likely not related to an infection.        Hypotension   Assessment & Plan    Resolved        Suicidal ideations   Assessment & Plan    Per law enforcement on a legal hold   Had to evaluate suicidal ideation as per collateral history, continue to deny it  History of bipolar disorder  Psychiatry evaluated, legal hold extended  Recommended treatment in  mental health facility  Patient medically clear for transfer  Pending placement.        Anxiety   Assessment & Plan    Hx of resume prozac        Tobacco use- (present on admission)   Assessment & Plan    Patient was counseled.  Nicotine patch available as needed.          Type 2 diabetes mellitus with hyperglycemia, without long-term current use of insulin (HCC)- (present on admission)   Assessment & Plan    Poorly controlled; A1c 12.6  SSI ordered  Added glipizide  Resume home metformin   Diabetes education provided.    Pt refusing insulin and glipizide, wants to discuss it first with his pcp  Will dc that for now. Continue metformin        Plan of care discussed with multidisciplinary team during rounds.

## 2018-08-08 NOTE — PSYCHIATRY
Pt having some negotiating behavior to try to get off his legal hold; discussed with RN. We are definitely not discontinuing his hold at this time. Our team will round on him tomorrow.

## 2018-08-08 NOTE — DISCHARGE PLANNING
Legal Hold     Referral: Legal Hold Court     Intervention: Pt presented for legal hold meeting with  to discuss legal options of contesting hold and meeting with the court doctors this afternoon, or not contesting legal hold and continuing the hold for one week.  advised that pt's legal hold will be be continued for one week (08/16/2018).       Plan: Pt's legal hold has been continued for one week. Pt awaiting transfer to an in patient psych facility.

## 2018-08-08 NOTE — PSYCHIATRY
"PSYCHIATRIC FOLLOW UP:    Reason for Admission: Overdose  Legal hold status: On legal 2000  Psychiatric Supervising Attending: Dr. Marycruz Medina     ID: 42 year old  male with history of Bipolar I disorder admitted after Xanax overdose.      Subjective:   I was able to speak to patient's ex-wife again over the phone; she said that she and her older daughter have visited and he consistently denies her version of events. She says that however, she and both of her daughters have the \"same understanding\" of the events; and that they are both very upset at what happened. She told me that she was going to go before the  to take temporary custody of both of her children, \"not to be mean, but I want to make sure that I have them while he's getting help.\"    Later, I visited with Mr. Hlil after she had served him the papers. I was informed by the RN that he wanted to see a psychiatrist and talk about it. In the document she reports that he has been very emotionally erratic during her visits; has been crying to being very irritable and angry. He has allegedly called her over 20 times at one point, has been refusing to take anything besides his metformin and prozac.     I asked him if he had read it; he says, \"Yeah, I've read every word of it.\" He says that he feels like he's being \"set up\" by his wife, and that he had a feeling that she would leverage this situation to her advantage. We went on to have a long conversation about his being in the hospital and on a legal hold. He once again talked about his version of events; denies that he was \"sleeping all day\" or that he was \"throwing things around the room.\" Said he had lunch with his daughter; he says that the night of the incident he had been talking over the phone to a woman living in [facility] for about two months; he realized that she had a very different idea of what relationship they had and wanted to  him; he then suggested " "that night that they break up; however, he also felt an extreme panic attack come on after the call.     He says that he took \"2 or 3 Xanax pills and I think some trazodone\" - he says he just wanted to make the anxiety stop, did not intend to hurt himself or render him less conscious. When he realized what was happening, he said he tried to text his older daughter to pick her sister up \"because I knew she had to be back home later that evening and I couldn't drive.\" He says he only called his ex-wife when he couldn't reach her. He denies that he ever told her that he wanted to kill himself. Now that he's been served these papers, however, he says that he feels that it might be good for him to leave [place] for a bit and go back to [place]. He says he moved here to be closer to his daughters, but the rent here is too high, and he has been on edge dealing with his ex-wife and custody issues. He says, \"She cheated on me for a year before I found out; that's why I  her. I went through 24 years of loveless marriage, but I still love my girls so much and I would do anything for them.\" He says that he would like to have some space between his ex-wife for now. Says he's estranged from most of his family right now, doesn't have many friends except at work. Loves his dog.    He is anxious that he is going to lose his job and his home because he can't afford rent, and still feels that he doesn't need to be here. We clarified that when I made recommendation for inpatient psych, it was not \"long-term treatment.\" We talked about his prior medications; says he saw Dr. Munoz for 15 years and says that they talked about his bipolar diagnosis and how he isn't sure he has it. He denies any episodes of decreased need for sleep, flight of ideas; says he gets \"enthusiastic\" sometimes and likes to plan his day. 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     The last time he saw her they agreed on Prozac and Topamax because he felt that Prozac always helped with his depression. He says \"The biggest thing for me is that I wasn't taking either of those medications for the past month\" due to not being able to afford it. He admits to being more irritable, more on edge. He says he definitely won't take Seroquel or Depakote because \"I was 300 lbs on those medications.\" He is agreeable to trying out a combination of Prozac and Tegretol; he says that Tegretol was the next medication that Dr. Munoz was going to try him on if Topamax didn't work.     At the end of our conversation, I told him very straightforwardly that I can't lift the legal hold myself and that he would also need to be seen by an attending. He very visibly became upset, although he did not get angry. He said, \"I feel like you guys really don't understand what I'm going through.\" He ran through a list of complaints about his stay in the hospital, including how food is served so late and that his salad didn't have dressing on it. We ended on rather cordial terms; he thanked me for spending time with him and for our conversation.    Addendum: Initially I spoke to patient believing his last A1C was 8.0; he told me it used to be 10.0. I have since reviewed the labs again and his most recent A1C on 8/3/18 was 12.6, indicating very poor control.    Psychiatric Examination:   Vitals: Weight/BMI: Body mass index is 33.31 kg/m². Blood pressure 152/94, pulse (!) 105, temperature 36.1 °C (97 °F), resp. rate 18, height 1.778 m (5' 10\"), weight 105.3 kg (232 lb 2.3 oz), SpO2 98 %.   Vitals:    08/06/18 2030 08/07/18 0250 08/07/18 0650 08/07/18 1420   BP: 151/88 138/69 135/90 152/94   Pulse: 88 85 78 (!) 105   Resp: 19 18 18 18   Temp: 36.7 °C (98 °F) 36.9 °C (98.4 °F) 36.5 °C (97.7 °F) 36.1 °C (97 °F)   SpO2: 99% 96% 98% 98%   Weight:       Height: " "        Appearance:  male dressed in street clothes  Behavior: Cordial and much more open than before, although somewhat pressing at times  Thought Content: No SI/HI, no AVH, no delusions  Thought Process: Linear for the most part; some tangentiality and avoidance of more uncomfortable topics (ex: the night of the incident, he would veer away and then come back to it after I asked again or in another way)  Speech: Normal rate and ryhythm  Mood: \"I can't be here\"  Affect: Euthymic to elevated  Attention/Alertness: Attends well  Orientation/Memory: Oriented  Insight/Judgement: Limited/fair      Medical Systems Reviewed:  All negative      Lab Results/Imaging:  Reviewed.    ASSESSMENT:   1. Bipolar I Disorder, current episode mixed  2. Unspecified anxiety disorder  3. Borderline traits     This is a 42 year old  male with history of Bipolar I disorder who is admitted after a Xanax overdose; this was indeed a suicide attempt despite his statements to the contrary. He continues to deny this but does acknowledge today during our long conversation that he understands \"your psychiatrists' point of view\" and understands why he's on a legal hold. Says that he is willing to try Tegretol along with Prozac, and acknowledges his stressors since he's been in [place].     PLAN:  Legal status: extended      - Continue Prozac 20 mg  - Start Tegretol 100 mg BID     Psychiatry will follow.  Thank you for the consult.     "

## 2018-08-08 NOTE — ASSESSMENT & PLAN NOTE
- Psychiatry following, greatly appreciate   - s/p Prozac  - Tegretol dose increased, Na stable at 132 today

## 2018-08-09 LAB
GLUCOSE BLD-MCNC: 139 MG/DL (ref 65–99)
GLUCOSE BLD-MCNC: 161 MG/DL (ref 65–99)
GLUCOSE BLD-MCNC: 179 MG/DL (ref 65–99)
GLUCOSE BLD-MCNC: 199 MG/DL (ref 65–99)

## 2018-08-09 PROCEDURE — A9270 NON-COVERED ITEM OR SERVICE: HCPCS | Performed by: PSYCHIATRY & NEUROLOGY

## 2018-08-09 PROCEDURE — 700102 HCHG RX REV CODE 250 W/ 637 OVERRIDE(OP): Performed by: INTERNAL MEDICINE

## 2018-08-09 PROCEDURE — A9270 NON-COVERED ITEM OR SERVICE: HCPCS | Performed by: INTERNAL MEDICINE

## 2018-08-09 PROCEDURE — 700102 HCHG RX REV CODE 250 W/ 637 OVERRIDE(OP): Performed by: STUDENT IN AN ORGANIZED HEALTH CARE EDUCATION/TRAINING PROGRAM

## 2018-08-09 PROCEDURE — 82962 GLUCOSE BLOOD TEST: CPT

## 2018-08-09 PROCEDURE — A9270 NON-COVERED ITEM OR SERVICE: HCPCS | Performed by: STUDENT IN AN ORGANIZED HEALTH CARE EDUCATION/TRAINING PROGRAM

## 2018-08-09 PROCEDURE — 700102 HCHG RX REV CODE 250 W/ 637 OVERRIDE(OP): Performed by: PSYCHIATRY & NEUROLOGY

## 2018-08-09 PROCEDURE — G0378 HOSPITAL OBSERVATION PER HR: HCPCS

## 2018-08-09 PROCEDURE — 99225 PR SUBSEQUENT OBSERVATION CARE,LEVEL II: CPT | Performed by: FAMILY MEDICINE

## 2018-08-09 PROCEDURE — A9270 NON-COVERED ITEM OR SERVICE: HCPCS | Performed by: HOSPITALIST

## 2018-08-09 PROCEDURE — 700102 HCHG RX REV CODE 250 W/ 637 OVERRIDE(OP): Performed by: HOSPITALIST

## 2018-08-09 RX ORDER — CARBAMAZEPINE 100 MG/1
200 TABLET, CHEWABLE ORAL 3 TIMES DAILY
Status: DISCONTINUED | OUTPATIENT
Start: 2018-08-09 | End: 2018-08-13 | Stop reason: HOSPADM

## 2018-08-09 RX ADMIN — NICOTINE POLACRILEX 4 MG: 2 GUM, CHEWING BUCCAL at 11:38

## 2018-08-09 RX ADMIN — NICOTINE POLACRILEX 4 MG: 2 GUM, CHEWING BUCCAL at 06:18

## 2018-08-09 RX ADMIN — NICOTINE POLACRILEX 4 MG: 2 GUM, CHEWING BUCCAL at 16:42

## 2018-08-09 RX ADMIN — GLIPIZIDE 5 MG: 5 TABLET ORAL at 06:18

## 2018-08-09 RX ADMIN — NICOTINE POLACRILEX 4 MG: 2 GUM, CHEWING BUCCAL at 21:30

## 2018-08-09 RX ADMIN — CARBAMAZEPINE 100 MG: 100 TABLET, CHEWABLE ORAL at 06:18

## 2018-08-09 RX ADMIN — METFORMIN HYDROCHLORIDE 1000 MG: 500 TABLET, FILM COATED ORAL at 18:16

## 2018-08-09 RX ADMIN — NICOTINE POLACRILEX 4 MG: 2 GUM, CHEWING BUCCAL at 09:08

## 2018-08-09 RX ADMIN — METFORMIN HYDROCHLORIDE 1000 MG: 500 TABLET, FILM COATED ORAL at 09:01

## 2018-08-09 RX ADMIN — CARBAMAZEPINE 200 MG: 100 TABLET, CHEWABLE ORAL at 18:15

## 2018-08-09 RX ADMIN — ASPIRIN 81 MG: 81 TABLET, COATED ORAL at 06:18

## 2018-08-09 RX ADMIN — FLUOXETINE HYDROCHLORIDE 20 MG: 20 CAPSULE ORAL at 06:18

## 2018-08-09 ASSESSMENT — PAIN SCALES - GENERAL
PAINLEVEL_OUTOF10: 0
PAINLEVEL_OUTOF10: 0

## 2018-08-09 NOTE — PROGRESS NOTES
[facility] Medicine Daily Progress Note    Date of Service  8/9/2018    Chief Complaint  42 y.o. male with history of bipolar disorder admitted 8/3/2018 with Xanax overdose and suspicion for suicidal behavior      Interval Problem Update  Admitted to overdose on Xanax today.  Upset that he has to be discharged to psychiatric facility.    Consultants/Specialty  Psychiatry    Disposition  Mental health facility -     medically cleared    Review of Systems  Review of Systems   Unable to perform ROS: Psychiatric disorder        Physical Exam  Blood Pressure: 120/80   Temperature:  (pt refused)   Pulse: (!) 105   Respiration: 20   Pulse Oximetry: 94 %     Physical Exam   Constitutional: He is oriented to person, place, and time. He appears well-developed and well-nourished. No distress.   HENT:   Head: Normocephalic and atraumatic.   Mouth/Throat: No oropharyngeal exudate.   Eyes: Pupils are equal, round, and reactive to light. Conjunctivae are normal. Left eye exhibits no discharge. No scleral icterus.   Neck: Neck supple. No JVD present. No tracheal deviation present.   Cardiovascular: Normal rate and regular rhythm.  Exam reveals no gallop and no friction rub.    Pulmonary/Chest: Effort normal and breath sounds normal. No respiratory distress. He has no wheezes.   Abdominal: Bowel sounds are normal. He exhibits no distension. There is no tenderness.   Musculoskeletal: He exhibits no tenderness or deformity.   Neurological: He is alert and oriented to person, place, and time.   Skin: Skin is dry. No rash noted. He is not diaphoretic. No erythema.   Psychiatric: His mood appears anxious. His affect is labile. His speech is rapid and/or pressured. He is agitated and withdrawn. He expresses impulsivity. He expresses suicidal ideation.             Fluids    Intake/Output Summary (Last 24 hours) at 08/09/18 1547  Last data filed at 08/09/18 1400   Gross per 24 hour   Intake              598 ml   Output                0 ml    Net              598 ml       Laboratory  Recent Labs      08/08/18   0853   WBC  9.6   RBC  5.75   HEMOGLOBIN  17.4   HEMATOCRIT  48.8   MCV  84.9   MCH  30.3   MCHC  35.7*   RDW  35.6*   PLATELETCT  304   MPV  10.3     Recent Labs      08/08/18   0853   SODIUM  132*   POTASSIUM  3.8   CHLORIDE  100   CO2  20   GLUCOSE  239*   BUN  12   CREATININE  0.66   CALCIUM  9.6                   Imaging  DX-CHEST-PORTABLE (1 VIEW)   Final Result      No acute cardiac or pulmonary abnormalities are identified. Lung volumes are low.           Assessment/Plan  * Drug overdose   Assessment & Plan    Mr. Hill was here with a confirmed overdose of T42 - Antiepileptic, sedative-hypnotic, or anti-Parkinsonism drugs; he has no other known overdoses.    Overdose on Xanax  Resolved  Psychiatry following.  Pending inpatient psychiatric facility placement.        Bipolar disorder (HCC)- (present on admission)   Assessment & Plan    Psychiatry following.  Prozac has been discontinued.  Tegretol dose increased.        Fever   Assessment & Plan    afebrile over the last 24 hours.  Likely not related to an infection.        Hypotension   Assessment & Plan    Resolved        Suicidal ideations   Assessment & Plan    Per law enforcement on a legal hold   Had to evaluate suicidal ideation as per collateral history, continue to deny it  History of bipolar disorder  Psychiatry evaluated, legal hold extended  Recommended treatment in  mental health facility  Patient medically clear for transfer  Pending placement.        Anxiety   Assessment & Plan    Hx of   Used to be on Xanax who he overdosed on.  Psychiatry following.        Tobacco use- (present on admission)   Assessment & Plan    Patient was counseled.  Nicotine patch available as needed.          Type 2 diabetes mellitus with hyperglycemia, without long-term current use of insulin (HCC)- (present on admission)   Assessment & Plan    Poorly controlled; A1c 12.6  SSI ordered  Added  glipizide  Resume home metformin   Diabetes education provided.    Pt refusing insulin and glipizide, wants to discuss it first with his pcp  Will dc that for now. Continue metformin        DVT prophylaxis with heparin.  Plan of care discussed with multidisciplinary team during rounds.

## 2018-08-09 NOTE — PROGRESS NOTES
Diabetes education: Met with patient today. Pt did take his Glipizide with his Metformin but continues to refuse his insulin. Pt has been allowing  finger sticks (though not consistently) but they remain at every six hours! Finger sticks have been: 8/8: 165 at 1335, and 264 at 1818.    Plan: CDE will continue to follow and give Accucheck guide meter when and if appropriate at discharge.   Please change finger sticks to ac and hs not every six hours and he may be more willing to have them done ( as well as the timing would be more appropriate).   Please call [phone] if needs change.

## 2018-08-09 NOTE — DISCHARGE PLANNING
Received Stipulation and Order from the court continuing pt until 8/16/18. Sent copy to unit LSW.

## 2018-08-09 NOTE — PROGRESS NOTES
Uneventful night. Pt was pleasant with staffs. Pt did not sleep much during shift. Eager to speak to the psychiatrist today. Remains on legal hold with 1:1 sitter present. Will continue to monitor.

## 2018-08-09 NOTE — PROGRESS NOTES
RN paged Dr. Castellanos. RN received a call back from Pamela Guzman. We discussed pt not wanting to be on seroquel or depakote due to weight gain in the past. Pt agreed to take increased Tegretol. RN informed them that pt has been taking all medications except heparin, senna, and insulin. Dr. Guzman stated they will make a note about this. They currently want to try Tegretol 200mg only    RN and Charge RN sat down with pt and discussed medications, visit with MD, and needs. Pt agreeable with RN's and plan of care at this time.

## 2018-08-09 NOTE — PSYCHIATRY
"PSYCHIATRIC FOLLOW UP:    Reason for Admission: suicide attempt   Legal hold status:   On hold   Psychiatric Supervising Attending:     Carol      HPI:       43 y/o man with suicide attempt, has been repeatedly demanding re-evals, he is currently pacing the room and appears manic. He asked why he is here and I stated \"we believe you had a suicide attempt\" and he stated \"yes.\"  Discussed medication refusals with him which he denies (although it is clearly documented in the chart). He said \"I can call the  on you\".  Discussed with him of course he can, he's been evaluated this week, and the PD's office decided to continue his case for one week. He said \"I can call them back at any time\" and stated he could call his PD any time he likes. He is emotional and erratic as described in other notes, believe prozac may be contributory so will discontinue. He told this provider to get out of the room and then swore about me to the sitter.      Psychiatric Examination: observed phenomenon:  Vitals:   Vitals:    08/08/18 0655 08/08/18 1900 08/09/18 0300 08/09/18 0807   BP: 149/98 153/93 125/83 145/94   Pulse: 91 96 81 93   Resp: 18 18 16 16   Temp: 36.3 °C (97.3 °F) 36.9 °C (98.4 °F) 36.7 °C (98 °F) 36.7 °C (98.1 °F)   SpO2: 98% 93% 98% 97%   Weight:       Height:           Musculoskeletal psychomotor agitation   Appearance: grooming fair. Intense eye contact. Hair messy.   Thoughts:  Grandiose. Linear, goal directed deniea a/vh   Speech: pressured, intimidating tone   Mood:    Elevated, irritable   Affect:    Labile   SI/HI:   Denies   Attention/Alertness:   Awake, alert   Memory:    Unable to fully assess  Orientation:    Grossly intact.   Fund of Knowledge:    Unable to assess  Cognition: impaired by sol   Insight/Judgement into symptoms impaired   Neurological Testing:    Medical systems reviewed:     Unable to fully assess, lack of cooperation     Lab results/tests:   Recent Results (from the " past 48 hour(s))   ACCU-CHEK GLUCOSE    Collection Time: 08/07/18  6:05 PM   Result Value Ref Range    Glucose - Accu-Ck 223 (H) 65 - 99 mg/dL   ACCU-CHEK GLUCOSE    Collection Time: 08/07/18  8:26 PM   Result Value Ref Range    Glucose - Accu-Ck 289 (H) 65 - 99 mg/dL   CBC WITH DIFFERENTIAL    Collection Time: 08/08/18  8:53 AM   Result Value Ref Range    WBC 9.6 4.8 - 10.8 K/uL    RBC 5.75 4.70 - 6.10 M/uL    Hemoglobin 17.4 14.0 - 18.0 g/dL    Hematocrit 48.8 42.0 - 52.0 %    MCV 84.9 81.4 - 97.8 fL    MCH 30.3 27.0 - 33.0 pg    MCHC 35.7 (H) 33.7 - 35.3 g/dL    RDW 35.6 (L) 35.9 - 50.0 fL    Platelet Count 304 164 - 446 K/uL    MPV 10.3 9.0 - 12.9 fL    Neutrophils-Polys 49.00 44.00 - 72.00 %    Lymphocytes 38.90 22.00 - 41.00 %    Monocytes 9.40 0.00 - 13.40 %    Eosinophils 1.70 0.00 - 6.90 %    Basophils 0.70 0.00 - 1.80 %    Immature Granulocytes 0.30 0.00 - 0.90 %    Nucleated RBC 0.00 /100 WBC    Neutrophils (Absolute) 4.68 1.82 - 7.42 K/uL    Lymphs (Absolute) 3.72 1.00 - 4.80 K/uL    Monos (Absolute) 0.90 (H) 0.00 - 0.85 K/uL    Eos (Absolute) 0.16 0.00 - 0.51 K/uL    Baso (Absolute) 0.07 0.00 - 0.12 K/uL    Immature Granulocytes (abs) 0.03 0.00 - 0.11 K/uL    NRBC (Absolute) 0.00 K/uL   COMP METABOLIC PANEL    Collection Time: 08/08/18  8:53 AM   Result Value Ref Range    Sodium 132 (L) 135 - 145 mmol/L    Potassium 3.8 3.6 - 5.5 mmol/L    Chloride 100 96 - 112 mmol/L    Co2 20 20 - 33 mmol/L    Anion Gap 12.0 (H) 0.0 - 11.9    Glucose 239 (H) 65 - 99 mg/dL    Bun 12 8 - 22 mg/dL    Creatinine 0.66 0.50 - 1.40 mg/dL    Calcium 9.6 8.5 - 10.5 mg/dL    AST(SGOT) 13 12 - 45 U/L    ALT(SGPT) 16 2 - 50 U/L    Alkaline Phosphatase 78 30 - 99 U/L    Total Bilirubin 0.7 0.1 - 1.5 mg/dL    Albumin 4.8 3.2 - 4.9 g/dL    Total Protein 7.5 6.0 - 8.2 g/dL    Globulin 2.7 1.9 - 3.5 g/dL    A-G Ratio 1.8 g/dL   ESTIMATED GFR    Collection Time: 08/08/18  8:53 AM   Result Value Ref Range    GFR If  >60  >60 mL/min/1.73 m 2    GFR If Non African American >60 >60 mL/min/1.73 m 2   ACCU-CHEK GLUCOSE    Collection Time: 08/08/18  1:35 PM   Result Value Ref Range    Glucose - Accu-Ck 165 (H) 65 - 99 mg/dL   ACCU-CHEK GLUCOSE    Collection Time: 08/08/18  6:19 PM   Result Value Ref Range    Glucose - Accu-Ck 264 (H) 65 - 99 mg/dL   ACCU-CHEK GLUCOSE    Collection Time: 08/09/18 12:02 AM   Result Value Ref Range    Glucose - Accu-Ck 161 (H) 65 - 99 mg/dL   ACCU-CHEK GLUCOSE    Collection Time: 08/09/18  6:22 AM   Result Value Ref Range    Glucose - Accu-Ck 199 (H) 65 - 99 mg/dL              Assessment:  Bipolar disorder             Plan:  legal hold: extended. We are very unlikely to let him off this hold, especially with his erratic, irritable, and unpredictable behaviors.     D/c prozac  Increase tegretol.

## 2018-08-10 PROBLEM — E87.1 HYPONATREMIA: Status: ACTIVE | Noted: 2018-08-10

## 2018-08-10 LAB
GLUCOSE BLD-MCNC: 133 MG/DL (ref 65–99)
GLUCOSE BLD-MCNC: 238 MG/DL (ref 65–99)
GLUCOSE BLD-MCNC: 250 MG/DL (ref 65–99)

## 2018-08-10 PROCEDURE — 82962 GLUCOSE BLOOD TEST: CPT | Mod: 91

## 2018-08-10 PROCEDURE — G0378 HOSPITAL OBSERVATION PER HR: HCPCS

## 2018-08-10 PROCEDURE — A9270 NON-COVERED ITEM OR SERVICE: HCPCS | Performed by: INTERNAL MEDICINE

## 2018-08-10 PROCEDURE — A9270 NON-COVERED ITEM OR SERVICE: HCPCS | Performed by: PSYCHIATRY & NEUROLOGY

## 2018-08-10 PROCEDURE — 99225 PR SUBSEQUENT OBSERVATION CARE,LEVEL II: CPT | Performed by: FAMILY MEDICINE

## 2018-08-10 PROCEDURE — A9270 NON-COVERED ITEM OR SERVICE: HCPCS | Performed by: FAMILY MEDICINE

## 2018-08-10 PROCEDURE — 700102 HCHG RX REV CODE 250 W/ 637 OVERRIDE(OP): Performed by: PSYCHIATRY & NEUROLOGY

## 2018-08-10 PROCEDURE — 700102 HCHG RX REV CODE 250 W/ 637 OVERRIDE(OP): Performed by: HOSPITALIST

## 2018-08-10 PROCEDURE — 700102 HCHG RX REV CODE 250 W/ 637 OVERRIDE(OP): Performed by: INTERNAL MEDICINE

## 2018-08-10 PROCEDURE — 700102 HCHG RX REV CODE 250 W/ 637 OVERRIDE(OP): Performed by: FAMILY MEDICINE

## 2018-08-10 PROCEDURE — A9270 NON-COVERED ITEM OR SERVICE: HCPCS | Performed by: HOSPITALIST

## 2018-08-10 RX ORDER — SODIUM CHLORIDE 1 G/1
1 TABLET ORAL
Status: DISCONTINUED | OUTPATIENT
Start: 2018-08-10 | End: 2018-08-13 | Stop reason: HOSPADM

## 2018-08-10 RX ORDER — DEXTROSE MONOHYDRATE 25 G/50ML
25 INJECTION, SOLUTION INTRAVENOUS
Status: DISCONTINUED | OUTPATIENT
Start: 2018-08-10 | End: 2018-08-13 | Stop reason: HOSPADM

## 2018-08-10 RX ADMIN — CARBAMAZEPINE 200 MG: 100 TABLET, CHEWABLE ORAL at 11:41

## 2018-08-10 RX ADMIN — CARBAMAZEPINE 200 MG: 100 TABLET, CHEWABLE ORAL at 05:05

## 2018-08-10 RX ADMIN — SODIUM CHLORIDE TAB 1 GM 1 G: 1 TAB at 17:46

## 2018-08-10 RX ADMIN — NICOTINE POLACRILEX 4 MG: 2 GUM, CHEWING BUCCAL at 12:25

## 2018-08-10 RX ADMIN — CARBAMAZEPINE 200 MG: 100 TABLET, CHEWABLE ORAL at 17:46

## 2018-08-10 RX ADMIN — METFORMIN HYDROCHLORIDE 1000 MG: 500 TABLET, FILM COATED ORAL at 17:46

## 2018-08-10 RX ADMIN — METFORMIN HYDROCHLORIDE 1000 MG: 500 TABLET, FILM COATED ORAL at 08:44

## 2018-08-10 RX ADMIN — STANDARDIZED SENNA CONCENTRATE AND DOCUSATE SODIUM 2 TABLET: 8.6; 5 TABLET, FILM COATED ORAL at 17:46

## 2018-08-10 RX ADMIN — GLIPIZIDE 5 MG: 5 TABLET ORAL at 05:06

## 2018-08-10 RX ADMIN — ASPIRIN 81 MG: 81 TABLET, COATED ORAL at 05:06

## 2018-08-10 RX ADMIN — NICOTINE POLACRILEX 4 MG: 2 GUM, CHEWING BUCCAL at 08:50

## 2018-08-10 ASSESSMENT — PAIN SCALES - GENERAL
PAINLEVEL_OUTOF10: 0
PAINLEVEL_OUTOF10: 0

## 2018-08-10 NOTE — CARE PLAN
Problem: Communication  Goal: The ability to communicate needs accurately and effectively will improve  Outcome: PROGRESSING AS EXPECTED  Pt asking to see psych to d/c legal hold, read note from psych yesterday. Pt told MD to leave room. Pt educated about events from yesterday.     Problem: Mobility  Goal: Risk for activity intolerance will decrease  Outcome: PROGRESSING AS EXPECTED  Pt upself, pt steady while ambulating.

## 2018-08-10 NOTE — PROGRESS NOTES
Pt Aox4, pt up self on RA, pt on legal hold, pt on 1:1 sitter for SI attempt. Pt irritated this AM and stating wanting to leave. Pt educated legally he is unable to leave because of active legal hold, pt stating he does not care and we can not stop him. Pt currently has not yet attempted to leave, hourly rounding in place. Pt has bed locked and in lowest position.

## 2018-08-10 NOTE — CARE PLAN
Problem: Discharge Barriers/Planning  Goal: Patient's continuum of care needs will be met  Outcome: PROGRESSING AS EXPECTED      Problem: Psychosocial Needs:  Goal: Level of anxiety will decrease  Outcome: PROGRESSING AS EXPECTED

## 2018-08-10 NOTE — ASSESSMENT & PLAN NOTE
Normal sodium in the past, likely side effects of Tegretol.  - Continue to monitor intermittently   - Restrict free water intake to 2 L/day  - continue with salt tabs TID

## 2018-08-10 NOTE — DISCHARGE PLANNING
Call placed to [facility], message left for Marycruz for return call on status of referral.

## 2018-08-10 NOTE — PROGRESS NOTES
Hospital Medicine Daily Progress Note    Date of Service  8/10/2018    Chief Complaint  42 y.o. male with history of bipolar disorder admitted 8/3/2018 with Xanax overdose and suspicion for suicidal behavior      Interval Problem Update  No new issues to report.  Continue to refuse his medications.    Consultants/Specialty  Psychiatry    Disposition  Mental health facility -     medically cleared    Review of Systems  Review of Systems   Unable to perform ROS: Psychiatric disorder        Physical Exam  Blood Pressure: 120/80   Temperature:  (pt refused)   Pulse: (!) 105   Respiration: 20   Pulse Oximetry: 94 %     Physical Exam   Constitutional: He is oriented to person, place, and time. He appears well-nourished. No distress.   HENT:   Head: Normocephalic and atraumatic.   Left Ear: External ear normal.   Mouth/Throat: No oropharyngeal exudate.   Eyes: Pupils are equal, round, and reactive to light. Conjunctivae are normal. Right eye exhibits no discharge. Left eye exhibits no discharge.   Neck: Neck supple. No tracheal deviation present.   Cardiovascular: Normal rate and regular rhythm.  Exam reveals no gallop and no friction rub.    Pulmonary/Chest: Effort normal and breath sounds normal. No stridor. He has no wheezes. He has no rales.   Abdominal: Bowel sounds are normal. He exhibits no distension. There is no tenderness. There is no rebound.   Musculoskeletal: He exhibits no edema, tenderness or deformity.   Neurological: He is alert and oriented to person, place, and time.   Skin: Skin is dry. No rash noted. He is not diaphoretic. No erythema.   Psychiatric: His mood appears anxious. His affect is labile. His speech is rapid and/or pressured. He is agitated and withdrawn. He expresses impulsivity. He expresses suicidal ideation.             Fluids    Intake/Output Summary (Last 24 hours) at 08/10/18 1436  Last data filed at 08/10/18 1011   Gross per 24 hour   Intake              300 ml   Output                 0 ml   Net              300 ml       Laboratory  Recent Labs      08/08/18   0853   WBC  9.6   RBC  5.75   HEMOGLOBIN  17.4   HEMATOCRIT  48.8   MCV  84.9   MCH  30.3   MCHC  35.7*   RDW  35.6*   PLATELETCT  304   MPV  10.3     Recent Labs      08/08/18   0853   SODIUM  132*   POTASSIUM  3.8   CHLORIDE  100   CO2  20   GLUCOSE  239*   BUN  12   CREATININE  0.66   CALCIUM  9.6                   Imaging  DX-CHEST-PORTABLE (1 VIEW)   Final Result      No acute cardiac or pulmonary abnormalities are identified. Lung volumes are low.           Assessment/Plan  * Drug overdose   Assessment & Plan    Mr. Hill was here with a confirmed overdose of T42 - Antiepileptic, sedative-hypnotic, or anti-Parkinsonism drugs; he has no other known overdoses.    Overdose on Xanax  Resolved  Psychiatry following.  Pending inpatient psychiatric facility placement.        Hyponatremia- (present on admission)   Assessment & Plan    Normal sodium in the past.  This is likely side effects of Tegretol.  Continue to monitor.  May need to consider decreasing the dose or switching to another medicine that does not affect sodium.  Restrict free water intake to 2 L per day.  Salt tablets.        Bipolar disorder (HCC)- (present on admission)   Assessment & Plan    Psychiatry following.  Prozac has been discontinued.  Tegretol dose increased.        Fever   Assessment & Plan    afebrile over the last 24 hours.  Likely not related to an infection.        Hypotension   Assessment & Plan    Resolved        Suicidal ideations   Assessment & Plan    Per law enforcement on a legal hold   Had to evaluate suicidal ideation as per collateral history, continue to deny it  History of bipolar disorder  Psychiatry evaluated, legal hold extended  Recommended treatment in  mental health facility  Patient medically clear for transfer  Pending placement.        Anxiety   Assessment & Plan    Hx of   Used to be on Xanax who he overdosed on.  Psychiatry  following.        Tobacco use- (present on admission)   Assessment & Plan    Patient was counseled.  Nicotine patch available as needed.          Type 2 diabetes mellitus with hyperglycemia, without long-term current use of insulin (HCC)- (present on admission)   Assessment & Plan    Poorly controlled; A1c 12.6  SSI ordered  Added glipizide  Resume home metformin   Diabetes education provided.    Pt refusing insulin and glipizide, wants to discuss it first with his pcp  Will dc that for now. Continue metformin        DVT prophylaxis with heparin.  Plan of care discussed with multidisciplinary team during rounds.

## 2018-08-10 NOTE — PROGRESS NOTES
Assumed care of pt. Received bedside report from nightshift RN. Pt is A&O X4. Pt denies any needs at this time. Treaded slipper socks in use. Call light is within reach. Bed is locked and in the lowest position. Hourly rounding in place. Pt is on a legal hold. Sitter at bedside.

## 2018-08-11 PROBLEM — I95.9 HYPOTENSION: Status: RESOLVED | Noted: 2018-08-03 | Resolved: 2018-08-11

## 2018-08-11 PROBLEM — R50.9 FEVER: Status: RESOLVED | Noted: 2018-08-04 | Resolved: 2018-08-11

## 2018-08-11 LAB
GLUCOSE BLD-MCNC: 143 MG/DL (ref 65–99)
GLUCOSE BLD-MCNC: 189 MG/DL (ref 65–99)

## 2018-08-11 PROCEDURE — 82962 GLUCOSE BLOOD TEST: CPT

## 2018-08-11 PROCEDURE — G0378 HOSPITAL OBSERVATION PER HR: HCPCS

## 2018-08-11 PROCEDURE — A9270 NON-COVERED ITEM OR SERVICE: HCPCS | Performed by: PSYCHIATRY & NEUROLOGY

## 2018-08-11 PROCEDURE — 700102 HCHG RX REV CODE 250 W/ 637 OVERRIDE(OP): Performed by: FAMILY MEDICINE

## 2018-08-11 PROCEDURE — 700102 HCHG RX REV CODE 250 W/ 637 OVERRIDE(OP): Performed by: INTERNAL MEDICINE

## 2018-08-11 PROCEDURE — 700102 HCHG RX REV CODE 250 W/ 637 OVERRIDE(OP): Performed by: HOSPITALIST

## 2018-08-11 PROCEDURE — A9270 NON-COVERED ITEM OR SERVICE: HCPCS | Performed by: FAMILY MEDICINE

## 2018-08-11 PROCEDURE — 99224 PR SUBSEQUENT OBSERVATION CARE,LEVEL I: CPT | Performed by: HOSPITALIST

## 2018-08-11 PROCEDURE — 700102 HCHG RX REV CODE 250 W/ 637 OVERRIDE(OP): Performed by: PSYCHIATRY & NEUROLOGY

## 2018-08-11 PROCEDURE — A9270 NON-COVERED ITEM OR SERVICE: HCPCS | Performed by: HOSPITALIST

## 2018-08-11 PROCEDURE — A9270 NON-COVERED ITEM OR SERVICE: HCPCS | Performed by: INTERNAL MEDICINE

## 2018-08-11 RX ADMIN — ASPIRIN 81 MG: 81 TABLET, COATED ORAL at 05:21

## 2018-08-11 RX ADMIN — METFORMIN HYDROCHLORIDE 1000 MG: 500 TABLET, FILM COATED ORAL at 08:02

## 2018-08-11 RX ADMIN — NICOTINE POLACRILEX 4 MG: 2 GUM, CHEWING BUCCAL at 02:48

## 2018-08-11 RX ADMIN — SODIUM CHLORIDE TAB 1 GM 1 G: 1 TAB at 17:49

## 2018-08-11 RX ADMIN — CARBAMAZEPINE 200 MG: 100 TABLET, CHEWABLE ORAL at 05:22

## 2018-08-11 RX ADMIN — SODIUM CHLORIDE TAB 1 GM 1 G: 1 TAB at 08:02

## 2018-08-11 RX ADMIN — METFORMIN HYDROCHLORIDE 1000 MG: 500 TABLET, FILM COATED ORAL at 17:49

## 2018-08-11 RX ADMIN — CARBAMAZEPINE 200 MG: 100 TABLET, CHEWABLE ORAL at 17:49

## 2018-08-11 RX ADMIN — GLIPIZIDE 5 MG: 5 TABLET ORAL at 05:22

## 2018-08-11 RX ADMIN — CARBAMAZEPINE 200 MG: 100 TABLET, CHEWABLE ORAL at 12:35

## 2018-08-11 RX ADMIN — SODIUM CHLORIDE TAB 1 GM 1 G: 1 TAB at 12:08

## 2018-08-11 ASSESSMENT — ENCOUNTER SYMPTOMS
PALPITATIONS: 0
CHILLS: 0
ABDOMINAL PAIN: 0
HEADACHES: 0
WEAKNESS: 0
FALLS: 0
BLURRED VISION: 0
VOMITING: 0
NAUSEA: 0
SHORTNESS OF BREATH: 0
LOSS OF CONSCIOUSNESS: 0
FEVER: 0

## 2018-08-11 NOTE — CARE PLAN
Problem: Psychosocial Needs:  Goal: Level of anxiety will decrease  Outcome: PROGRESSING AS EXPECTED  Pt communicates needs effectively.

## 2018-08-11 NOTE — CARE PLAN
Problem: Mobility  Goal: Risk for activity intolerance will decrease  Outcome: PROGRESSING AS EXPECTED  Pt ambulates with a steady gait in room.

## 2018-08-11 NOTE — CARE PLAN
Problem: Psychosocial Needs:  Goal: Level of anxiety will decrease    Intervention: Identify and develop with patient strategies to cope with anxiety triggers  I have reviewed with patient how to reduce triggers.  Is calm at this time

## 2018-08-11 NOTE — PROGRESS NOTES
Pt A&Ox4. Pt able to ambulate independently. VSS. Bed in lowest position with call light in reach. Pt belongings within reach. Will continue to monitor.

## 2018-08-11 NOTE — PROGRESS NOTES
"Hospital Medicine Daily Progress Note    Date of Service  8/11/2018    Chief Complaint  42 y.o. male with history of bipolar disorder admitted 8/3/2018 with Xanax overdose and suspicion for suicidal behavior    Interval Problem Update  No medical issues or concerns. \"I've been here too long.\" No acute overnight events.    Consultants/Specialty  Psychiatry    Disposition  [facility] health facility- medically cleared    Review of Systems  Review of Systems   Constitutional: Negative for chills and fever.   Eyes: Negative for blurred vision.   Respiratory: Negative for shortness of breath.    Cardiovascular: Negative for chest pain, palpitations and leg swelling.   Gastrointestinal: Negative for abdominal pain, nausea and vomiting.   Genitourinary: Negative for dysuria.   Musculoskeletal: Negative for falls.   Neurological: Negative for loss of consciousness, weakness and headaches.   Psychiatric/Behavioral:        Denies   All other systems reviewed and are negative.       Physical Exam  Blood Pressure: 120/80   Temperature:  (pt refused)   Pulse: (!) 105   Respiration: 20   Pulse Oximetry: 94 %     Physical Exam   Constitutional: He is oriented to person, place, and time. He appears well-nourished. No distress.   HENT:   Head: Normocephalic and atraumatic.   Left Ear: External ear normal.   Mouth/Throat: No oropharyngeal exudate.   Eyes: Pupils are equal, round, and reactive to light. Conjunctivae are normal. Right eye exhibits no discharge. Left eye exhibits no discharge.   Neck: Neck supple. No tracheal deviation present.   Cardiovascular: Normal rate and regular rhythm.  Exam reveals no gallop and no friction rub.    Pulmonary/Chest: Effort normal and breath sounds normal. No stridor. He has no wheezes. He has no rales.   Abdominal: Bowel sounds are normal. He exhibits no distension. There is no tenderness. There is no rebound.   Musculoskeletal: He exhibits no edema, tenderness or deformity.   Neurological: He is " alert and oriented to person, place, and time.   Skin: Skin is dry. No rash noted. He is not diaphoretic. No erythema.   Psychiatric: His speech is normal. His mood appears anxious. His affect is labile. He is withdrawn. He expresses impulsivity. He expresses no suicidal plans.             Fluids    Intake/Output Summary (Last 24 hours) at 08/11/18 0646  Last data filed at 08/10/18 2100   Gross per 24 hour   Intake              880 ml   Output                0 ml   Net              880 ml       Laboratory  Recent Labs      08/08/18   0853   WBC  9.6   RBC  5.75   HEMOGLOBIN  17.4   HEMATOCRIT  48.8   MCV  84.9   MCH  30.3   MCHC  35.7*   RDW  35.6*   PLATELETCT  304   MPV  10.3     Recent Labs      08/08/18   0853   SODIUM  132*   POTASSIUM  3.8   CHLORIDE  100   CO2  20   GLUCOSE  239*   BUN  12   CREATININE  0.66   CALCIUM  9.6                   Imaging  DX-CHEST-PORTABLE (1 VIEW)   Final Result      No acute cardiac or pulmonary abnormalities are identified. Lung volumes are low.           Assessment/Plan  * Drug overdose- (present on admission)   Assessment & Plan    Mr. Hill was here with a confirmed overdose of T42 - Antiepileptic, sedative-hypnotic, or anti-Parkinsonism drugs; he has no other known overdoses. Overdose on Xanax  - medically cleared for psych facility  - psych evaluated, greatly appreciate  - pending inpatient psychiatric facility placement        Bipolar disorder (HCC)- (present on admission)   Assessment & Plan    - Psychiatry following, greatly appreciate   - s/p Prozac   - Tegretol dose increased        Type 2 diabetes mellitus with hyperglycemia, without long-term current use of insulin (HCC)- (present on admission)   Assessment & Plan    Poorly controlled; A1c 12.6%. Takes metformin and dulagutide at home  - SSI  - added glipizide  - continue metformin  - Pt refusing insulin and glipizide, wants to discuss it first with his pcp        Hyponatremia- (present on admission)   Assessment  & Plan    Normal sodium in the past, likely side effects of Tegretol.  - Continue to monitor intermittently   - Restrict free water intake to 2 L/day  - continue with salt tabs TID        Suicidal ideations- (present on admission)   Assessment & Plan    Legal hold extended. Had suicidal ideation as per collateral history, he continues to deny it  - psych following  - Recommended inpatient mental health facility, pending placement        Anxiety- (present on admission)   Assessment & Plan    Used to be on Xanax, overdosed on it prior to admission  - ativan only for dangerousness        Tobacco use- (present on admission)   Assessment & Plan    Patient was counseled.  Nicotine patch available as needed.          Plan of care discussed with multidisciplinary team during rounds.

## 2018-08-11 NOTE — PROGRESS NOTES
Pt stating would take insulin, brought insulin to room, pt refusing once medication is in room to administer.

## 2018-08-12 LAB
ANION GAP SERPL CALC-SCNC: 9 MMOL/L (ref 0–11.9)
BUN SERPL-MCNC: 14 MG/DL (ref 8–22)
CALCIUM SERPL-MCNC: 9.5 MG/DL (ref 8.5–10.5)
CHLORIDE SERPL-SCNC: 99 MMOL/L (ref 96–112)
CO2 SERPL-SCNC: 24 MMOL/L (ref 20–33)
CREAT SERPL-MCNC: 0.81 MG/DL (ref 0.5–1.4)
GLUCOSE BLD-MCNC: 161 MG/DL (ref 65–99)
GLUCOSE BLD-MCNC: 174 MG/DL (ref 65–99)
GLUCOSE BLD-MCNC: 175 MG/DL (ref 65–99)
GLUCOSE SERPL-MCNC: 211 MG/DL (ref 65–99)
POTASSIUM SERPL-SCNC: 4.3 MMOL/L (ref 3.6–5.5)
SODIUM SERPL-SCNC: 132 MMOL/L (ref 135–145)

## 2018-08-12 PROCEDURE — 80048 BASIC METABOLIC PNL TOTAL CA: CPT

## 2018-08-12 PROCEDURE — A9270 NON-COVERED ITEM OR SERVICE: HCPCS | Performed by: INTERNAL MEDICINE

## 2018-08-12 PROCEDURE — 700102 HCHG RX REV CODE 250 W/ 637 OVERRIDE(OP): Performed by: PSYCHIATRY & NEUROLOGY

## 2018-08-12 PROCEDURE — 700102 HCHG RX REV CODE 250 W/ 637 OVERRIDE(OP): Performed by: HOSPITALIST

## 2018-08-12 PROCEDURE — 700102 HCHG RX REV CODE 250 W/ 637 OVERRIDE(OP): Performed by: INTERNAL MEDICINE

## 2018-08-12 PROCEDURE — 99225 PR SUBSEQUENT OBSERVATION CARE,LEVEL II: CPT | Performed by: HOSPITALIST

## 2018-08-12 PROCEDURE — G0378 HOSPITAL OBSERVATION PER HR: HCPCS

## 2018-08-12 PROCEDURE — A9270 NON-COVERED ITEM OR SERVICE: HCPCS | Performed by: PSYCHIATRY & NEUROLOGY

## 2018-08-12 PROCEDURE — 82962 GLUCOSE BLOOD TEST: CPT | Mod: 91

## 2018-08-12 PROCEDURE — 36415 COLL VENOUS BLD VENIPUNCTURE: CPT

## 2018-08-12 PROCEDURE — A9270 NON-COVERED ITEM OR SERVICE: HCPCS | Performed by: FAMILY MEDICINE

## 2018-08-12 PROCEDURE — 700102 HCHG RX REV CODE 250 W/ 637 OVERRIDE(OP): Performed by: FAMILY MEDICINE

## 2018-08-12 PROCEDURE — A9270 NON-COVERED ITEM OR SERVICE: HCPCS | Performed by: HOSPITALIST

## 2018-08-12 RX ADMIN — SODIUM CHLORIDE TAB 1 GM 1 G: 1 TAB at 08:33

## 2018-08-12 RX ADMIN — METFORMIN HYDROCHLORIDE 1000 MG: 500 TABLET, FILM COATED ORAL at 17:22

## 2018-08-12 RX ADMIN — CARBAMAZEPINE 200 MG: 100 TABLET, CHEWABLE ORAL at 12:11

## 2018-08-12 RX ADMIN — CARBAMAZEPINE 200 MG: 100 TABLET, CHEWABLE ORAL at 17:22

## 2018-08-12 RX ADMIN — ASPIRIN 81 MG: 81 TABLET, COATED ORAL at 07:06

## 2018-08-12 RX ADMIN — METFORMIN HYDROCHLORIDE 1000 MG: 500 TABLET, FILM COATED ORAL at 08:33

## 2018-08-12 RX ADMIN — NICOTINE POLACRILEX 4 MG: 2 GUM, CHEWING BUCCAL at 15:23

## 2018-08-12 RX ADMIN — SODIUM CHLORIDE TAB 1 GM 1 G: 1 TAB at 12:11

## 2018-08-12 RX ADMIN — CARBAMAZEPINE 200 MG: 100 TABLET, CHEWABLE ORAL at 07:06

## 2018-08-12 RX ADMIN — SODIUM CHLORIDE TAB 1 GM 1 G: 1 TAB at 17:22

## 2018-08-12 RX ADMIN — GLIPIZIDE 5 MG: 5 TABLET ORAL at 07:06

## 2018-08-12 ASSESSMENT — ENCOUNTER SYMPTOMS
NERVOUS/ANXIOUS: 0
BLURRED VISION: 0
WEAKNESS: 0
LOSS OF CONSCIOUSNESS: 0
VOMITING: 0
SHORTNESS OF BREATH: 0
FEVER: 0
PALPITATIONS: 0
HEADACHES: 0
NAUSEA: 0
CHILLS: 0
ABDOMINAL PAIN: 0
FALLS: 0

## 2018-08-12 ASSESSMENT — PAIN SCALES - GENERAL: PAINLEVEL_OUTOF10: 0

## 2018-08-12 ASSESSMENT — LIFESTYLE VARIABLES: SUBSTANCE_ABUSE: 0

## 2018-08-12 NOTE — PROGRESS NOTES
Pt complaining about dietary telling pt that pt is on a calorie restriction. Per [facility] diet order and talk with MD, pt has no calorie restriction only a diabetic diet. RN spoke with dietician to explain this and earlier confusion with dietary rep. Per dietician, will send rep to get pt order again. All was communicated to pt who is not content.

## 2018-08-12 NOTE — PROGRESS NOTES
"Pt woke up in the middle of shift, requested to talk to me. The pt asked me \"did I miss anything when I was sleeping?\" \"I just want to make sure I did everything that I am suppose to so I can get out of here\" Pt expressed his concern and worries with me. Pt stated that the new medications regimen that were recently started on him seems to be messing with him. He stated \"I usually never have any dreams when I sleep, now I had one every time I sleep. I panicked when I woke up\" He said he's unaware about the changes in his regimen. Also said \"I don't understand why they stopped my Prozac, I've been taking that for years and I felt fine. I'm afraid that all of these could be from me withdrawing from it.\"    Pt said that no one have really updated him about his care or let him know where he stand with his legal hold status. Pt was visibly upset and at times in tears when talking about how he might lose his house and job because he has been here for too long. Pt also said that he has never tried to end his life. He said he still care deeply for his daughters.     Pt would like to know if it's possible for him to get a 2nd opinion from another Psychiatrist. Pt stated \"I will do anything so that I can get out of here. The only thing I have been refusing is my Insulin. Since they started me on Glipizide, I felt like I have been well controlled. I used to live my life with my blood sugar in the 300s, so if I get too low like in the low 100s my brain feel hazy and not clear, and I'm afraid taking the insulin would drop me that low.\"    I told the pt that I will speak to the charge nurse so that she can relay the message to the day team about his concerns. I also told the pt to remains calm and collected when he speak to the day team. Will continue to monitor the pt closely.  "

## 2018-08-12 NOTE — PROGRESS NOTES
"Hospital Medicine Daily Progress Note    Date of Service  8/12/2018    Chief Complaint  42 y.o. male with history of bipolar disorder admitted 8/3/2018 with Xanax overdose and suspicion for suicidal behavior    Interval Problem Update  Asking how long he will be here/length of time for legal hold.  Expressing concern re: retaining his job, house.  Does not like the new medications as he reports feeling \"foggy\" on them.  Refused AM labs, explained need for BMP given potential medication side effects.    Consultants/Specialty  Psychiatry    Disposition  Mental health facility- medically cleared    Review of Systems  Review of Systems   Constitutional: Negative for chills and fever.   Eyes: Negative for blurred vision.   Respiratory: Negative for shortness of breath.    Cardiovascular: Negative for chest pain, palpitations and leg swelling.   Gastrointestinal: Negative for abdominal pain, nausea and vomiting.   Genitourinary: Negative for dysuria.   Musculoskeletal: Negative for falls.   Neurological: Negative for loss of consciousness, weakness and headaches.   Psychiatric/Behavioral: Negative for substance abuse and suicidal ideas. The patient is not nervous/anxious.         Denies   All other systems reviewed and are negative.       Physical Exam  Blood Pressure: 120/80   Temperature:  (pt refused)   Pulse: (!) 105   Respiration: 20   Pulse Oximetry: 94 %     Physical Exam   Constitutional: He is oriented to person, place, and time. He appears well-nourished. No distress.   HENT:   Head: Normocephalic and atraumatic.   Left Ear: External ear normal.   Mouth/Throat: No oropharyngeal exudate.   Eyes: Pupils are equal, round, and reactive to light. Conjunctivae are normal. Right eye exhibits no discharge. Left eye exhibits no discharge.   Neck: Neck supple. No tracheal deviation present.   Cardiovascular: Normal rate and regular rhythm.  Exam reveals no gallop.    Pulmonary/Chest: Effort normal and breath sounds " normal. No stridor. He has no wheezes. He has no rales.   Abdominal: Bowel sounds are normal. He exhibits no distension. There is no tenderness. There is no rebound.   Musculoskeletal: He exhibits no edema or deformity.   Neurological: He is alert and oriented to person, place, and time.   Skin: Skin is dry. No rash noted. He is not diaphoretic. No erythema.   Psychiatric: His speech is normal. His mood appears anxious. His affect is labile (per staff). He expresses impulsivity. He expresses no suicidal ideation. He expresses no suicidal plans.             Fluids    Intake/Output Summary (Last 24 hours) at 08/12/18 0728  Last data filed at 08/11/18 2000   Gross per 24 hour   Intake              660 ml   Output                0 ml   Net              660 ml       Laboratory                        Imaging  DX-CHEST-PORTABLE (1 VIEW)   Final Result      No acute cardiac or pulmonary abnormalities are identified. Lung volumes are low.           Assessment/Plan  * Drug overdose- (present on admission)   Assessment & Plan    Mr. Hill was here with a confirmed overdose of T42 - Antiepileptic, sedative-hypnotic, or anti-Parkinsonism drugs; he has no other known overdoses. Overdose on Xanax but denies overdose intent.  - psych evaluated, greatly appreciate  - pending inpatient psychiatric facility placement  - patient wishes to contest his legal hold, asked SW to follow up on this        Bipolar disorder (HCC)- (present on admission)   Assessment & Plan    - Psychiatry following, greatly appreciate   - s/p Prozac  - Tegretol dose increased, Na stable at 132 today        Type 2 diabetes mellitus with hyperglycemia, without long-term current use of insulin (HCC)- (present on admission)   Assessment & Plan    Poorly controlled; A1c 12.6%. Takes metformin and dulagutide at home  - SSI  - added glipizide  - continue metformin  - Pt refusing insulin and glipizide, wants to discuss it first with his pcp        Hyponatremia-  (present on admission)   Assessment & Plan    Normal sodium in the past, likely side effects of Tegretol.  - Continue to monitor intermittently   - Restrict free water intake to 2 L/day  - continue with salt tabs TID        Suicidal ideations- (present on admission)   Assessment & Plan    Legal hold extended. Had suicidal ideation as per collateral history, he continues to deny it  - psych following  - Recommended inpatient mental health facility, pending placement        Anxiety- (present on admission)   Assessment & Plan    Used to be on Xanax, overdosed on it prior to admission  - ativan only for dangerousness        Tobacco use- (present on admission)   Assessment & Plan    Patient was counseled.  Nicotine patch available as needed.          Plan of care discussed with multidisciplinary team during rounds.

## 2018-08-12 NOTE — CARE PLAN
Problem: Psychosocial Needs:  Goal: Level of anxiety will decrease  Pt able to communicate calmly about needs with staff.

## 2018-08-12 NOTE — CARE PLAN
Problem: Communication  Goal: The ability to communicate needs accurately and effectively will improve  Outcome: PROGRESSING AS EXPECTED  Educate the pt on the use of call light for assistance    Problem: Safety  Goal: Will remain free from injury  Outcome: PROGRESSING AS EXPECTED  1:1 sitter present per the legal hold order

## 2018-08-12 NOTE — DISCHARGE PLANNING
Anticipated Discharge Disposition: Mental Health facility    Action: Pt discussed in IDT rounds. Pt requesting information on contesting Legal Hold.    LSW ARJUN Jay, Legal Hold CCA, requesting information to be given to pt.    Barriers to Discharge: None    Plan: Provide information to pt, await acceptance from  facility.

## 2018-08-13 VITALS
HEIGHT: 70 IN | TEMPERATURE: 97.2 F | OXYGEN SATURATION: 96 % | SYSTOLIC BLOOD PRESSURE: 148 MMHG | WEIGHT: 233.25 LBS | RESPIRATION RATE: 16 BRPM | DIASTOLIC BLOOD PRESSURE: 92 MMHG | HEART RATE: 75 BPM | BODY MASS INDEX: 33.39 KG/M2

## 2018-08-13 PROBLEM — T50.901A DRUG OVERDOSE: Status: RESOLVED | Noted: 2018-08-03 | Resolved: 2018-08-13

## 2018-08-13 PROBLEM — R45.851 SUICIDAL IDEATIONS: Status: RESOLVED | Noted: 2018-08-03 | Resolved: 2018-08-13

## 2018-08-13 PROBLEM — F41.9 ANXIETY: Status: RESOLVED | Noted: 2018-08-03 | Resolved: 2018-08-13

## 2018-08-13 LAB — GLUCOSE BLD-MCNC: 184 MG/DL (ref 65–99)

## 2018-08-13 PROCEDURE — A9270 NON-COVERED ITEM OR SERVICE: HCPCS | Performed by: INTERNAL MEDICINE

## 2018-08-13 PROCEDURE — 99217 PR OBSERVATION CARE DISCHARGE: CPT | Performed by: HOSPITALIST

## 2018-08-13 PROCEDURE — A9270 NON-COVERED ITEM OR SERVICE: HCPCS | Performed by: FAMILY MEDICINE

## 2018-08-13 PROCEDURE — 700102 HCHG RX REV CODE 250 W/ 637 OVERRIDE(OP): Performed by: INTERNAL MEDICINE

## 2018-08-13 PROCEDURE — 700102 HCHG RX REV CODE 250 W/ 637 OVERRIDE(OP): Performed by: FAMILY MEDICINE

## 2018-08-13 PROCEDURE — 700102 HCHG RX REV CODE 250 W/ 637 OVERRIDE(OP): Performed by: PSYCHIATRY & NEUROLOGY

## 2018-08-13 PROCEDURE — 82962 GLUCOSE BLOOD TEST: CPT

## 2018-08-13 PROCEDURE — A9270 NON-COVERED ITEM OR SERVICE: HCPCS | Performed by: PSYCHIATRY & NEUROLOGY

## 2018-08-13 PROCEDURE — 700102 HCHG RX REV CODE 250 W/ 637 OVERRIDE(OP): Performed by: HOSPITALIST

## 2018-08-13 PROCEDURE — G0378 HOSPITAL OBSERVATION PER HR: HCPCS

## 2018-08-13 PROCEDURE — A9270 NON-COVERED ITEM OR SERVICE: HCPCS | Performed by: HOSPITALIST

## 2018-08-13 RX ORDER — SODIUM CHLORIDE 1 G/1
1 TABLET ORAL
Qty: 90 TAB | Refills: 3 | Status: SHIPPED | OUTPATIENT
Start: 2018-08-13 | End: 2018-09-20

## 2018-08-13 RX ORDER — CARBAMAZEPINE 100 MG/1
200 TABLET, CHEWABLE ORAL 3 TIMES DAILY
Qty: 90 TAB | Refills: 0 | Status: SHIPPED | OUTPATIENT
Start: 2018-08-13 | End: 2018-09-20

## 2018-08-13 RX ORDER — GLIPIZIDE 5 MG/1
5 TABLET ORAL
Qty: 60 TAB | Refills: 0 | Status: SHIPPED | OUTPATIENT
Start: 2018-08-14 | End: 2018-09-20

## 2018-08-13 RX ADMIN — ASPIRIN 81 MG: 81 TABLET, COATED ORAL at 05:31

## 2018-08-13 RX ADMIN — CARBAMAZEPINE 200 MG: 100 TABLET, CHEWABLE ORAL at 05:31

## 2018-08-13 RX ADMIN — GLIPIZIDE 5 MG: 5 TABLET ORAL at 05:31

## 2018-08-13 RX ADMIN — METFORMIN HYDROCHLORIDE 1000 MG: 500 TABLET, FILM COATED ORAL at 08:57

## 2018-08-13 RX ADMIN — SODIUM CHLORIDE TAB 1 GM 1 G: 1 TAB at 08:57

## 2018-08-13 RX ADMIN — NICOTINE POLACRILEX 2 MG: 2 GUM, CHEWING BUCCAL at 09:08

## 2018-08-13 ASSESSMENT — PATIENT HEALTH QUESTIONNAIRE - PHQ9
SUM OF ALL RESPONSES TO PHQ9 QUESTIONS 1 AND 2: 1
1. LITTLE INTEREST OR PLEASURE IN DOING THINGS: SEVERAL DAYS

## 2018-08-13 NOTE — DISCHARGE PLANNING
Agency/Facility Name: [facility]   Spoke To: Marycruz  Outcome: Referral still pending. No admissions until tomorrow. JOE Almendarez notified.

## 2018-08-13 NOTE — DISCHARGE SUMMARY
"Discharge Summary    CHIEF COMPLAINT ON ADMISSION  Chief Complaint   Patient presents with   • Suicidal Ideation     Per law enforcement, pt overdosed on medication and made suicidal statements   • Drug Overdose     Per law enforcement, pt told them he took 2 xanax, per EMS pt stated he took 3 xanax 1 mg       Reason for Admission  SI     Admission Date  8/3/2018    CODE STATUS  Full Code    HPI & HOSPITAL COURSE  This is a 42 y.o. male here with altered mentation and SI. He was brought in by [employer] when they got a call from his ex-wife stating that he had taken some xanax. They found him drowsy, with blood glucose in the 400's, and had made some suicidal statements so they brought him in for evaluation. He states that he felt like he was going to have an anxiety attack and took some xanax for the first time, not knowing how it was going to affect him, then took his sleeping medications. He was placed on a legal hold for a suicide attempt and psychiatry was consulted. Once the benzodiazepines were metabolized and his mental status improved, he denied having SI or that this was a suicide attempt. He was seen by psychiatry throughout his hospitalization and his legal hold was extended. He was trialed on different medications based on psychiatry recommendation to help with his bipolar and anxiety disorder. He had made several aggressive remarks towards staff, including that he would attempt to elope and \"take out\" the security guards as he does not belong here. His prozac was titrated down and stopped as it could trigger or worsen manic episodes. He was started on tegretol but stated that he felt \"foggy\" and wanted to discuss it with his outpatient psychiatrist. He tolerated it well and was more articulate throughout his hospitalization. Psych re-evaluated him and lifted the legal hold.      Of note, patient has poorly controlled DM with an A1C of 12.6%. He refused insulin throughout his hospitalization secondary " to fear that his work won't allow him to be a  if he uses insulin. Fortunately, he just needs to apply for an exception but he continued to decline the insulin. Glipizide was added to his regimen and he was advised to talk with his PCP about this further.       Therefore, he is discharged in good and stable condition to home with close outpatient follow-up.      Discharge Date  8/13/2018    FOLLOW UP ITEMS POST DISCHARGE  Follow up with PCP regarding uncontrolled DM and mild hyponatremia likely 2/2 tegretol.   Follow up with outpatient psychiatrist regarding his medication regimen for anxiety and bipolar disorder.    DISCHARGE DIAGNOSES  Principal Problem (Resolved):    Drug overdose POA: Yes  Active Problems:    Type 2 diabetes mellitus with hyperglycemia, without long-term current use of insulin (HCC) POA: Yes    Bipolar disorder (HCC) (Chronic) POA: Yes    Tobacco use POA: Yes    Hyponatremia POA: Yes  Resolved Problems:    Anxiety POA: Yes    Suicidal ideations POA: Yes    Hypotension POA: Yes    Fever POA: Yes      FOLLOW UP  Rochelle Munoz M.D.  [address] [address]  [phone]    In 1 week        MEDICATIONS ON DISCHARGE     Medication List      START taking these medications      Instructions   carBAMazepine 100 MG Chew  Commonly known as:  TEGRETOL   Take 2 Tabs by mouth 3 times a day.  Dose:  200 mg     glipiZIDE 5 MG Tabs  Commonly known as:  GLUCOTROL   Take 1 Tab by mouth every morning before breakfast.  Dose:  5 mg     sodium chloride 1 GM Tabs  Commonly known as:  SALT   Take 1 Tab by mouth 3 times a day, with meals.  Dose:  1 g        CONTINUE taking these medications      Instructions   aspirin EC 81 MG Tbec  Commonly known as:  ECOTRIN   Take 81 mg by mouth every day.  Dose:  81 mg     Dulaglutide 0.75 MG/0.5ML Sopn   Inject 0.75 mg as instructed every 7 days.  Dose:  0.75 mg     metformin 1000 MG tablet  Commonly known as:  GLUCOPHAGE   Take 1 Tab by mouth 2  times a day, with meals.  Dose:  1000 mg     nicotine polacrilex 4 MG gum  Commonly known as:  NICORETTE   Take 4 mg by mouth every hour while awake.  Dose:  4 mg     traZODone 100 MG Tabs  Commonly known as:  DESYREL   Take 100 mg by mouth at bedtime as needed.  Dose:  100 mg        STOP taking these medications    FLUoxetine 20 MG Caps  Commonly known as:  PROZAC     guaifenesin-codeine Soln oral solution  Commonly known as:  CHERATUSSIN AC     TOPAMAX 50 MG tablet  Generic drug:  topiramate            Allergies  Allergies   Allergen Reactions   • Hydrocodone-Acetaminophen Hives       DIET  Orders Placed This Encounter   Procedures   • Diet Order Diabetic (no sharps please, legal hold; double protein )     Standing Status:   Standing     Number of Occurrences:   1     Order Specific Question:   Diet:     Answer:   Diabetic [3]     Comments:   no sharps please, legal hold; double protein    • DISCONTINUE DIET TRAY     Standing Status:   Standing     Number of Occurrences:   1       ACTIVITY  As tolerated.  Weight bearing as tolerated    CONSULTATIONS  Pscyhiatry    PROCEDURES  DX-CHEST-PORTABLE (1 VIEW)   Final Result      No acute cardiac or pulmonary abnormalities are identified. Lung volumes are low.            LABORATORY  Lab Results   Component Value Date    SODIUM 132 (L) 08/12/2018    POTASSIUM 4.3 08/12/2018    CHLORIDE 99 08/12/2018    CO2 24 08/12/2018    GLUCOSE 211 (H) 08/12/2018    BUN 14 08/12/2018    CREATININE 0.81 08/12/2018        Lab Results   Component Value Date    WBC 9.6 08/08/2018    HEMOGLOBIN 17.4 08/08/2018    HEMATOCRIT 48.8 08/08/2018    PLATELETCT 304 08/08/2018        Total time of the discharge process exceeds 38 minutes.

## 2018-08-13 NOTE — PSYCHIATRY
"PSYCHIATRIC FOLLOW-UP:  *Reason for admission:Per law enforcement, pt told them he took 2 xanax, per EMS pt stated he took 3 xanax 1 mg   Legal Hold Status: +    Pt reiterates circumstances of admit saying he had a panic attack and took a xanax, it didn't work, took another and then another but at that point was sedated and when the police came they were concerned. He denies it was a SA. Talks about the stressors he is facing: a custody hearing on Oct 19, that he may have lost his job and condo (behind on Aug rent). He is hoping to get out so he can see where he stands. He denies SI/HI. His \"wife\" is actually his ex wife they are  per pt.     Pt is calm throughout this interview. Notes reviewed. Last time he was agitated was on the 10 th. He reports he is sleeping, is not having side effects and is willing to continue meds. He is willing to take po meds for DM but doesn't want insulin though he is aware his BS are around 200 mg. Says he does not feel well when it is lower.     Pt will follow up with Dr Muonz, psychiatry. He states that both she and him believe that he may not be bipolar I: he was unable to give me a hx supportive of same, and that he has anxiety and recurrent depressions.      *Psychiatric (Physical) Examination: observed phenomenon:  *Vitals:Blood pressure 148/92, pulse 75, temperature 36.2 °C (97.2 °F), resp. rate 16, height 1.778 m (5' 10\"), weight 105.8 kg (233 lb 4 oz), SpO2 96 %.  *Appearance/Attitude:  Clean, good eye contact, normal habitus. cooperative  *Muscle Strength: wnl  *Tone/Gait/Station:wnl  *Speech:wnl  *Thought Process/Associations: linear  *Abnormal/Psychotic Thoughts (ex): none  *Insight/Judgement: intact  *Orientation:x 4  *Memory:intact  *Attention/Concentration:intact  *Language:fluid  *Fund of Knowledge:not assesse  *Mood: worried but not having a panic attack and denies depression  *Affect: euthymic  *SI/HI:  denies  Reliability: good, hx is " consistent    *ASSESSMENT: ( acute, chronic, acute on chronic, complicated, stable, resolved)  Bipolar disorder unsepc: stabilized. R/O bipolar II, I, recurrent major depression    *Plan/Further Workup:   Legal status: dc. Will order script for tegretol only. I called to speak with pt to inform him that the prozac will not be prescribed. He can discuss this with his outpt psychiatrist. Just in case he ends up without work, he needs mental health referrals for other low income agencies. Discussed risks and benefits of antidepressants with and without mood stabilizers.       Signing off

## 2018-08-13 NOTE — DISCHARGE INSTRUCTIONS
"  Overdose, Adult  A person can overdose on alcohol, drugs or both by accident or on purpose. If it was on purpose, it is a serious matter. Professional help should be sought. If the overdose was an accident, certain steps should be taken to make sure that it never happens again.  ACCIDENTAL OVERDOSE  Overdosing on prescription medications can be a result of:  · Not understanding the instructions.  · Misreading the label.  · Forgetting that you took a dose and then taking another by mistake. This situation happens a lot.  To make sure this does not happen again:  · Clarify the correct dosage with your caregiver.  · Place the correct dosage in a \"pill-minder\" container (labeled for each day and time of day).  · Have someone dispense your medicine.  Please be sure to follow-up with your primary care doctor as directed.  INTENTIONAL OVERDOSE  If the overdose was on purpose, it is a serious situation. Taking more than the prescribed amount of medications (including taking someone else's prescription), abusing street drugs or drinking an amount of alcohol that requires medical treatment can show a variety of possible problems. These may indicate you:  · Are depressed or suicidal.  · Are abusing drugs, took too much or combined different drugs to experiment with the effects.  · Mixed alcohol with drugs and did not realize the danger of doing so (this is drug abuse).  · Are suffering addiction to drugs and/or alcohol (also known as chemical dependency).  · Binge drink.  If you have not been referred to a mental health professional for help, it is important that you get help right away. Only a professional can determine which problems may exist and what the best course of treatment may be. It is your responsibility to follow-up with further evaluation or treatment as directed.   Alcohol is responsible for a large number of overdoses and unintended deaths among college-age young adults. Binge drinking is consuming 4-5 drinks " "in a short period of time. The amount of alcohol in standard servings of wine (5 oz.), beer (12 oz.) and distilled spirits (1.5 oz., 80 proof) is the same. Beer or wine can be just as dangerous to the binge drinker as \"hard\" liquor can be.   CONSEQUENCES OF BINGE DRINKING  Alcohol poisoning is the most serious consequence of binge drinking. This is a severe and potentially fatal physical reaction to an alcohol overdose. When too much alcohol is consumed, the brain does not get enough oxygen. The lack of oxygen will eventually cause the brain to shut down the voluntary functions that regulate breathing and heart rate. Symptoms of alcohol poisoning include:  · Vomiting.  · Passing out (unconsciousness).  · Cold, clammy, pale or bluish skin.  · Slow or irregular breathing.  WHAT SHOULD I DO NEXT?  If you have a history of drug abuse or suffer chemical dependency (alcoholism, drug addiction or both), you might consider the following:  · Talk with a qualified substance abuse counselor and consider entering a treatment program.  · Go to a detox facility if necessary.  · If you were attending self-help group meetings, consider returning to them and go often.  · Explore other resources located near you (see sources listed below).  If you are unsure if you have a substance abuse problem, ask yourself the following questions:  · Have you been told by friends or family that drugs/alcohol has become a problem?  · Do you get into fights when drinking or using drugs?  · Do you have blackouts (not remembering what you do while using)?  · Do you lie about use or amounts of drugs or alcohol you consume?  · Do you need chemicals to get you going?  · Do you suffer in work or school performance because of drug or alcohol use?  · Do you get sick from drug or alcohol use but continue to use anyway?  · Do you need drugs or alcohol to relate to people or feel comfortable in social situations?  · Do you use drugs or alcohol to forget " "problems?  If you answered \"Yes\" to any of the above questions, it means you show signs of chemical dependency and a professional evaluation is suggested. The longer the use of drugs and alcohol continues, the problems will become greater.  SEEK IMMEDIATE MEDICAL CARE IF:   · You feel like you might repeat your problematic behavior.  · You need someone to talk to and feel that it should not wait.  · You feel you are a danger to yourself or someone else.  · You feel like you are having a new reaction to medications you are taking, or you are getting worse after leaving a care center.  · You have an overwhelming urge to drink or use drugs.  Addiction cannot be cured, but it can be treated successfully. Treatment centers are listed in the yellow pages under: Cocaine, Narcotics, and Alcoholics Anonymous. Most hospitals and clinics can refer you to a specialized care center. The  government maintains a toll-free number for obtaining treatment referrals: 1-485.271.6351 or 1-148.985.8564 (TDD) and maintains a website: http://findtreatment.[facility].gov. Other websites for additional information are: www.mentalhealth.[facility].gov. and www.[place].gov.  [place] treatment resources are listed in each Province with listings available under [facility] or similar titles.  Document Released: 12/20/2004 Document Revised: 03/11/2013 Document Reviewed: 11/11/2009  ExitCare® Patient Information ©2014 [employer].    "

## 2018-08-13 NOTE — DISCHARGE PLANNING
Agency/Facility Name: [facility]  Outcome: Called to speak with Marycruz in admissions to follow up on referral. I was told Marycruz will be in after 10am, will call back at that time.

## 2018-08-13 NOTE — DISCHARGE PLANNING
MSW received a call from Maciel at [facility] requesting a copy of pt's Facesheet.  Requested information faxed to [facility].

## 2018-08-14 ENCOUNTER — PATIENT OUTREACH (OUTPATIENT)
Dept: HEALTH INFORMATION MANAGEMENT | Facility: OTHER | Age: 43
End: 2018-08-14

## 2018-08-14 NOTE — LETTER
Tylor Hill  [address]  [place] [address]    August 14, 2018      Dear Tylor Hill,    [facility] wants to ensure your discharge home is safe and you or your loved ones have had all of your questions answered regarding your care after you leave the hospital.    Our discharge team was unsuccessful in our attempts to contact you telephonically and we wanted to be sure that you had a list of resources and contact information should you have any questions regarding your hospital stay, follow-up instructions, or active medical symptoms.    Questions or Concerns Regarding… Contact   Medical Questions Related to Your Discharge  (7 days a week, 8am-5pm) Contact a Nurse Care Coordinator   [phone]   Medical Questions Not Related to Your Discharge  (24 hours a day / 7 days a week)  Contact the Nurse Health Line   [phone]    Medications or Discharge Instructions Refer to your discharge packet   or contact your -[phone]   Follow-up Appointment(s) Schedule your appointment via [employer]   or contact Scheduling [phone]   Billing Review your statement via [employer]  or contact Billing [phone]   Medical Records Review your records via [employer]   or contact Medical Records [phone]     You can also easily access your medical information, test results and upcoming appointments via the [employer] free online health management tool. You can learn more and sign up at [employer]. For assistance setting up your [employer] account, please call [phone].    Once again, we want to ensure your discharge home is safe and that you have a clear understanding of any next steps in your care. If you have any questions or concerns, please do not hesitate to contact us, we are here for you. Thank you for choosing [facility] for your healthcare needs.    Sincerely,      Your [facility] Healthcare Team

## 2018-09-20 ENCOUNTER — HOSPITAL ENCOUNTER (EMERGENCY)
Facility: MEDICAL CENTER | Age: 43
End: 2018-09-20
Attending: EMERGENCY MEDICINE
Payer: COMMERCIAL

## 2018-09-20 VITALS
TEMPERATURE: 98.2 F | SYSTOLIC BLOOD PRESSURE: 135 MMHG | BODY MASS INDEX: 35.13 KG/M2 | HEART RATE: 97 BPM | RESPIRATION RATE: 18 BRPM | OXYGEN SATURATION: 94 % | DIASTOLIC BLOOD PRESSURE: 103 MMHG | HEIGHT: 69 IN | WEIGHT: 237.22 LBS

## 2018-09-20 DIAGNOSIS — G51.0 BELL'S PALSY: ICD-10-CM

## 2018-09-20 PROCEDURE — 99284 EMERGENCY DEPT VISIT MOD MDM: CPT

## 2018-09-20 RX ORDER — PREDNISONE 10 MG/1
TABLET ORAL
Qty: 1 QUANTITY SUFFICIENT | Refills: 0 | Status: SHIPPED | OUTPATIENT
Start: 2018-09-20 | End: 2019-08-20

## 2018-09-20 RX ORDER — CARBAMAZEPINE 100 MG/1
200 TABLET, CHEWABLE ORAL 3 TIMES DAILY
Qty: 180 TAB | Refills: 0 | Status: SHIPPED | OUTPATIENT
Start: 2018-09-20 | End: 2018-10-20

## 2018-09-20 RX ORDER — VALACYCLOVIR HYDROCHLORIDE 1 G/1
1000 TABLET, FILM COATED ORAL 3 TIMES DAILY
Qty: 21 TAB | Refills: 0 | Status: SHIPPED | OUTPATIENT
Start: 2018-09-20 | End: 2018-09-27

## 2018-09-20 RX ORDER — GLIPIZIDE 5 MG/1
5 TABLET ORAL
Qty: 30 TAB | Refills: 0 | Status: SHIPPED | OUTPATIENT
Start: 2018-09-20 | End: 2019-08-20 | Stop reason: SDUPTHER

## 2018-09-20 RX ORDER — TRAZODONE HYDROCHLORIDE 100 MG/1
100 TABLET ORAL
Qty: 30 TAB | Refills: 0 | Status: SHIPPED | OUTPATIENT
Start: 2018-09-20 | End: 2019-08-20

## 2018-09-20 ASSESSMENT — PAIN SCALES - GENERAL
PAINLEVEL_OUTOF10: 0

## 2018-09-20 NOTE — ED NOTES
Patient provided printed discharge instructions which included signs and symptoms to look out for, why to return to ER, and other follow up appointment to make with PCP. Patient provided  Work note and prescriptions, information on medications, and how to . Patient stated they understand discharge instructions and had no further questions or concerns at this time. Patient discharged to home with family. Patient ambulated out of ER with stable gait.

## 2018-09-20 NOTE — ED PROVIDER NOTES
"ED Provider Note    CHIEF COMPLAINT  Chief Complaint   Patient presents with   • Facial Swelling     started last night, unknown cause   • Numbness     Left side of face       HPI  Tylor Hill is a 43 y.o. male who presents complaining of discomfort with his right face.  He states that yesterday he was spitting and it was coming out of the right side of his cheek, and felt like his face was swollen.  He has a \"discomfort\" in his head which is \"dull\" better after ibuprofen.  Does not qualify this is even causing headache.  He states that his taste has been somewhat bland.  He denies any exacerbating symptoms to his headache.  Feels like his hearing is been dull.  It feels like his right face is numb.  Other than his face he has no other symptoms.  He states \"it is just my face not working.\"  He denies any vision changes, no problems with balance.  No weakness or numbness.  He points out he is able to drive and walk with no difficulty at all.  He has never had this before.  He has no other complaint.  He has had chickenpox in the past.  Denies any rashes to suggest shingles presently.    PAST MEDICAL HISTORY  Past Medical History:   Diagnosis Date   • Diabetes    • Mononucleosis    • Unspecified disorder of thyroid        FAMILY HISTORY  History reviewed. No pertinent family history.    SOCIAL HISTORY  Social History   Substance Use Topics   • Smoking status: Never Smoker   • Smokeless tobacco: Current User     Types: Chew      Comment: chewing tobacco once daily   • Alcohol use No         SURGICAL HISTORY  Past Surgical History:   Procedure Laterality Date   • OTHER ORTHOPEDIC SURGERY      1996 - L4-5 diskectomy       CURRENT MEDICATIONS  Home Medications     Reviewed by Silvano Lay (Pharmacy Tech) on 09/20/18 at 1629  Med List Status: Complete   Medication Last Dose Status   carBAMazepine (TEGRETOL) 100 MG Chew Tab 9/20/2018 Active   glipiZIDE (GLUCOTROL) 5 MG Tab 9/20/2018 Active   metformin " "(GLUCOPHAGE) 1000 MG tablet 9/20/2018 Active   trazodone (DESYREL) 100 MG Tab 9/19/2018 Active                I have reviewed the nurses notes and/or the list brought with the patient.    ALLERGIES  Allergies   Allergen Reactions   • Hydrocodone-Acetaminophen Hives       REVIEW OF SYSTEMS  See HPI for further details. Review of systems as above, otherwise all other systems are negative.     PHYSICAL EXAM  VITAL SIGNS: /103   Pulse 97   Temp 36.9 °C (98.4 °F)   Resp 19   Ht 1.753 m (5' 9\")   Wt 107.6 kg (237 lb 3.4 oz)   SpO2 96%   BMI 35.03 kg/m²     Constitutional: Well appearing patient in no acute distress.  Not toxic, nor ill in appearance.  HENT: Mucus membranes moist.  Oropharynx is clear.  There is no rash.  TMs are normal.  Eyes: Pupils equally round.  No scleral icterus.   Neck: Full nontender range of motion.  Lymphatic: No cervical lymphadenopathy noted.   Cardiovascular: Regular heart rate and rhythm.  No murmurs, rubs, nor gallop appreciated.   Thorax & Lungs: Chest is nontender.  Lungs are clear to auscultation with good air movement bilaterally.  No wheeze, rhonchi, nor rales.   Abdomen: Soft, with no tenderness, rebound nor guarding.  No mass, pulsatile mass, nor hepatosplenomegaly appreciated.  Skin: No purpura nor petechia noted.  Extremities/Musculoskeletal: No sign of trauma.  Calves are nontender with no cords nor edema.  No Gallito's sign.  Pulses are intact all around.   Neurologic: Alert & oriented.  Strength and sensation is intact all around.  Gait is normal.  There is no dysmetria.  No dysdiadochokinesia.  Cranial nerves are notable for a right-sided peripheral 7th nerve palsy.  Otherwise they are normal.  Psychiatric: Normal affect appropriate for the clinical situation.      MEDICAL RECORD  I have reviewed patient's medical record and pertinent results are listed above.    COURSE & MEDICAL DECISION MAKING  I have reviewed any medical record information, laboratory studies and " radiographic results as noted above.  This patient presents with symptoms and signs of a peripheral 7th nerve palsy.  He was given my usual discussion about Bell's palsy.  We will start him on a prednisone taper, as well as valacyclovir.  We talked about taping his eye shut at nighttime if necessary, and he will be given a roll of paper tape.  I have also written for some artificial tears.  We had a close discussion should he have any worsening symptoms of headache or develop other symptoms that would suggest a central process such as balance issues, difficulty swallowing, or any extremity symptoms he is return to the ER immediately.  He has asked about a primary care follow-up, I called our  to help arrange this.  He is out of his medications as of today, and I written for refills of his Desyrel, Glucophage, Glucotrol, Tegretol.  Generic instructions on Bell palsy.    FINAL IMPRESSION  1. Bell's palsy           This dictation was created using voice recognition software.    Electronically signed by: David Neely, 9/20/2018 4:45 PM

## 2018-09-20 NOTE — ED TRIAGE NOTES
"Chief Complaint   Patient presents with   • Facial Swelling     started last night, unknown cause   • Numbness     Left side of face     /103   Pulse (!) 106   Temp 36.9 °C (98.4 °F)   Resp 16   Ht 1.753 m (5' 9\")   Wt 107.6 kg (237 lb 3.4 oz)   SpO2 97%   BMI 35.03 kg/m²     "

## 2018-09-20 NOTE — ED NOTES
Pt in bed with family at bedside, c/o left side facial swelling and altered sensation which he noticed last night about 2230 when he couldn't spit tobacco the same. 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

## 2018-10-04 ENCOUNTER — HOSPITAL ENCOUNTER (EMERGENCY)
Facility: MEDICAL CENTER | Age: 43
End: 2018-10-04
Attending: EMERGENCY MEDICINE
Payer: COMMERCIAL

## 2018-10-04 VITALS
SYSTOLIC BLOOD PRESSURE: 160 MMHG | DIASTOLIC BLOOD PRESSURE: 115 MMHG | OXYGEN SATURATION: 99 % | TEMPERATURE: 98.9 F | HEART RATE: 96 BPM | HEIGHT: 69 IN | BODY MASS INDEX: 35.46 KG/M2 | WEIGHT: 239.42 LBS | RESPIRATION RATE: 16 BRPM

## 2018-10-04 DIAGNOSIS — G51.0 BELL'S PALSY: ICD-10-CM

## 2018-10-04 PROCEDURE — 99283 EMERGENCY DEPT VISIT LOW MDM: CPT

## 2018-10-04 RX ORDER — METHYLPREDNISOLONE 4 MG/1
TABLET ORAL
Qty: 1 KIT | Refills: 0 | Status: SHIPPED | OUTPATIENT
Start: 2018-10-04 | End: 2019-08-20

## 2018-10-04 RX ORDER — VALACYCLOVIR HYDROCHLORIDE 1 G/1
1000 TABLET, FILM COATED ORAL 2 TIMES DAILY
Qty: 20 TAB | Refills: 0 | Status: SHIPPED | OUTPATIENT
Start: 2018-10-04 | End: 2018-10-11

## 2018-10-04 ASSESSMENT — PAIN SCALES - GENERAL: PAINLEVEL_OUTOF10: 8

## 2018-10-05 NOTE — ED PROVIDER NOTES
"ED Provider Note    CHIEF COMPLAINT  Chief Complaint   Patient presents with   • Headache     s/p DX [place] Palsy on09/20   • Blurred Vision     per pt \"pretty much the entire time\"         HPI  Tylor Hill is a 43 y.o. male who presents to the ED with complaints of continued symptoms on his right face.  The patient was seen on 20 September and diagnosed with Bell's palsy apparently was started on steroids and discharge.  Patient does not have a primary care physician is not followed up with one.  He states that he finished this the steroid pack but states that his right side of the face is still weak he is still having some tingling he says is slightly worse so he is concerned and presented back to the ED for reevaluation.  Patient describes a mild headache describes intermittent vision blurriness but states currently he is just concerned because his face has not improved.    REVIEW OF SYSTEMS  See HPI for further details. All other systems are negative.     PAST MEDICAL HISTORY  Past Medical History:   Diagnosis Date   • Bell's palsy    • Diabetes    • Mononucleosis    • Unspecified disorder of thyroid        FAMILY HISTORY  History reviewed. No pertinent family history.  Patient's family history has been discussed and is been found to be noncontributory to his present illness  SOCIAL HISTORY  Social History     Social History   • Marital status:      Spouse name: N/A   • Number of children: N/A   • Years of education: N/A     Social History Main Topics   • Smoking status: Never Smoker   • Smokeless tobacco: Current User     Types: Chew      Comment: chewing tobacco once daily   • Alcohol use No   • Drug use: No   • Sexual activity: Not on file     Other Topics Concern   • Not on file     Social History Narrative          Anurag Olivares M.D.      SURGICAL HISTORY  Past Surgical History:   Procedure Laterality Date   • OTHER ORTHOPEDIC SURGERY      1996 - L4-5 diskectomy " "      CURRENT MEDICATIONS  Home Medications    **Home medications have not yet been reviewed for this encounter**       No current facility-administered medications on file prior to encounter.      Current Outpatient Prescriptions on File Prior to Encounter   Medication Sig Dispense Refill   • carBAMazepine (TEGRETOL) 100 MG Chew Tab Take 2 Tabs by mouth 3 times a day for 30 days. 180 Tab 0   • glipiZIDE (GLUCOTROL) 5 MG Tab Take 1 Tab by mouth every morning before breakfast. 30 Tab 0   • metformin (GLUCOPHAGE) 1000 MG tablet Take 1 Tab by mouth 2 times a day, with meals. 60 Tab 0   • traZODone (DESYREL) 100 MG Tab Take 1 Tab by mouth at bedtime as needed for Sleep. 30 Tab 0   • predniSONE (DELTASONE) 10 MG Tab Take 6 tabs (60 mg) po daily on days 1-5, then take 3 tabs (30 mg) po daily on days 4-6, then take 2 tabs (20 mg) po daily on days 7-9, then take 1 tab (10 mg) po daily on day 10. 1 Quantity Sufficient 0         ALLERGIES  Allergies   Allergen Reactions   • Hydrocodone-Acetaminophen Hives       PHYSICAL EXAM  VITAL SIGNS: /115   Pulse 96   Temp 37.2 °C (98.9 °F)   Resp 16   Ht 1.753 m (5' 9\")   Wt 108.6 kg (239 lb 6.7 oz)   SpO2 99%   BMI 35.36 kg/m²   Pulse Oximetry was obtained. It showed a reading of Pulse Oximetry: 99 %.  I interpreted this as nonhypoxic.   Constitutional: Well developed, Well nourished, No acute distress, Non-toxic appearance.   HENT: Head is normal.  By TMs normal pharynx is moist mucous membranes.  The right side the face will be discussed in neurologic  Eyes: Conjunctiva is normal  Neck: Normal range of motion, No tenderness, Supple, No stridor.   Cardiovascular: Normal heart rate, Normal rhythm, No murmurs, No rubs, No gallops. There are no carotid bruits.   Pulmonary: Normal breath sounds, No respiratory distress, No wheezing, No chest tenderness.   Abdomen: Bowel sounds normal, Soft, No tenderness, No masses, No pulsatile masses.   Skin: Warm, Dry, No erythema, No " rash.   Back: No tenderness, No CVA tenderness.   Extremities: Intact distal pulses, No edema, No tenderness, No cyanosis, No clubbing.   Neurologic: Patient has a pure cranial nerve VII deficit on the outside of the face.  He is able to move the right side of his face but there is some weakness comparative to the right cheek as well as the right forehead.  The patient does have a slight eye droop.  Has a difficult time closing his right eye.  Extraocular motions are intact the rest of the cranial nerves are completely intact.  Psychiatric: Affect normal, Judgment normal, Mood normal.     EKG      RADIOLOGY/PROCEDURES  None    COURSE & MEDICAL DECISION MAKING  Pertinent Labs & Imaging studies reviewed. (See chart for details)  Patient presents for evaluation.  Clinically the patient does have Bell's palsy.  He states it is gotten a little worse so I will restart the patient on a Medrol Dosepak and I will start antivirals as well.  I have explained to the patient about the progression of Bell's palsy and how it can take an extended period of time for the symptoms to return.  I recommended for the patient to follow-up with his primary care physician for recheck in 1 week and possible referral to neurology/ENT as needed.    FINAL IMPRESSION  1. Bell's palsy          The patient will return for new or worsening symptoms and is stable at the time of discharge.    The patient is referred to a primary physician for blood pressure management, diabetic screening, and for all other preventative health concerns.        DISPOSITION:  Patient will be discharged home in stable condition.    FOLLOW UP:  Anurag Olivares M.D.  [address] [place] [id number]  [phone]    Schedule an appointment as soon as possible for a visit in 1 week  For re-check, Return if any symptoms worsen      OUTPATIENT MEDICATIONS:  Discharge Medication List as of 10/4/2018  7:54 PM      START taking these medications    Details    MethylPREDNISolone (MEDROL DOSEPAK) 4 MG Tablet Therapy Pack Please dispense a Medrol Dosepak with instructions, use as directedDispense a Medrol Dosepak use as directedDisp-1 Kit, R-0, Print Rx Paper      valacyclovir (VALTREX) 1 GM Tab Take 1 Tab by mouth 2 times a day for 7 days., Disp-20 Tab, R-0, Print Rx Paper                 Electronically signed by: Wei Brooks, 10/4/2018 7:50 PM

## 2018-10-05 NOTE — ED TRIAGE NOTES
"Chief Complaint   Patient presents with   • Headache     s/p DX [place] Palsy on09/20   • Blurred Vision     per pt \"pretty much the entire time\"      /115   Pulse 96   Temp 37.2 °C (98.9 °F)   Resp 16   Ht 1.753 m (5' 9\")   Wt 108.6 kg (239 lb 6.7 oz)   SpO2 99%   BMI 35.36 kg/m²     "

## 2018-10-05 NOTE — ED NOTES
Pt refused discharge vital signs. Pt given written and oral discharge instructions. Pt verbalized understanding of all instructions given. All questions answered. VSS. Pt given paper prescriptions and educated on use and side effects. Pt given f/u instructions and educated on s/s of when to return to the ER. Pt ambulating independently upon time of discharge in good condition.

## 2019-08-20 ENCOUNTER — OFFICE VISIT (OUTPATIENT)
Dept: MEDICAL GROUP | Facility: MEDICAL CENTER | Age: 44
End: 2019-08-20
Payer: COMMERCIAL

## 2019-08-20 VITALS
OXYGEN SATURATION: 97 % | WEIGHT: 247.36 LBS | HEART RATE: 97 BPM | SYSTOLIC BLOOD PRESSURE: 120 MMHG | BODY MASS INDEX: 36.64 KG/M2 | DIASTOLIC BLOOD PRESSURE: 58 MMHG | TEMPERATURE: 98.5 F | HEIGHT: 69 IN

## 2019-08-20 DIAGNOSIS — Z72.0 TOBACCO USE: ICD-10-CM

## 2019-08-20 DIAGNOSIS — E11.65 TYPE 2 DIABETES MELLITUS WITH HYPERGLYCEMIA, WITHOUT LONG-TERM CURRENT USE OF INSULIN (HCC): ICD-10-CM

## 2019-08-20 DIAGNOSIS — E78.1 HYPERTRIGLYCERIDEMIA: ICD-10-CM

## 2019-08-20 DIAGNOSIS — E66.9 OBESITY (BMI 35.0-39.9 WITHOUT COMORBIDITY): ICD-10-CM

## 2019-08-20 PROBLEM — E87.1 HYPONATREMIA: Status: RESOLVED | Noted: 2018-08-10 | Resolved: 2019-08-20

## 2019-08-20 PROBLEM — F31.9 BIPOLAR DISORDER (HCC): Chronic | Status: RESOLVED | Noted: 2018-08-08 | Resolved: 2019-08-20

## 2019-08-20 LAB
HBA1C MFR BLD: 13.3 % (ref 0–5.6)
INT CON NEG: NEGATIVE
INT CON POS: POSITIVE

## 2019-08-20 PROCEDURE — 83036 HEMOGLOBIN GLYCOSYLATED A1C: CPT | Performed by: FAMILY MEDICINE

## 2019-08-20 PROCEDURE — 99214 OFFICE O/P EST MOD 30 MIN: CPT | Performed by: FAMILY MEDICINE

## 2019-08-20 RX ORDER — BLOOD-GLUCOSE METER
KIT MISCELLANEOUS
COMMUNITY
Start: 2019-03-06 | End: 2019-08-20

## 2019-08-20 RX ORDER — GLIPIZIDE 5 MG/1
5 TABLET ORAL
Qty: 90 TAB | Refills: 0 | Status: SHIPPED | OUTPATIENT
Start: 2019-08-20 | End: 2020-01-15

## 2019-08-20 SDOH — HEALTH STABILITY: MENTAL HEALTH: HOW OFTEN DO YOU HAVE A DRINK CONTAINING ALCOHOL?: NEVER

## 2019-08-20 SDOH — HEALTH STABILITY: MENTAL HEALTH: HOW OFTEN DO YOU HAVE 6 OR MORE DRINKS ON ONE OCCASION?: NEVER

## 2019-08-20 NOTE — PROGRESS NOTES
"cc: Diabetes    Subjective:     Tylor Hill is a 43 y.o. male presenting for follow-up of his diabetes.  Since he was last seen 2 years ago, he states that his blood sugars have been in the 200s.  He intermittently takes metformin and glipizide when he can.  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  He now has health insurance as well.  He was taking glipizide and metformin in the past, had tolerated well, denied any side effects.  He reports being off the medications for about 3 months.  Denies any polydipsia, polyuria, weight loss.  He has been exercising regularly, goes to the gym.  He has been trying to eat healthy as well, cutting down on carbs.  He continues to chew tobacco, is working on stopping this.      Review of systems:  See above.  Of note, his psychiatrist no longer believes that he has bipolar disorder, is now off medications.  They believe it was due to the stress from his divorce.      Current Outpatient Medications:   •  metformin (GLUCOPHAGE) 1000 MG tablet, Take 1 Tab by mouth 2 times a day, with meals., Disp: 180 Tab, Rfl: 0  •  glipiZIDE (GLUCOTROL) 5 MG Tab, Take 1 Tab by mouth every morning before breakfast., Disp: 90 Tab, Rfl: 0  •  aspirin EC (ECOTRIN) 81 MG Tablet Delayed Response, Take 81 mg by mouth every day., Disp: , Rfl:   •  Ibuprofen-diphenhydrAMINE Cit (ADVIL PM PO), Take  by mouth., Disp: , Rfl:     Allergies, past medical history, past surgical history, family history, social history reviewed and updated    Objective:     Vitals: /58   Pulse 97   Temp 36.9 °C (98.5 °F)   Ht 1.753 m (5' 9\")   Wt 112.2 kg (247 lb 5.7 oz)   SpO2 97%   BMI 36.53 kg/m²   General: Alert, pleasant, NAD  HEENT: Normocephalic.   Heart: Regular rate and rhythm.  S1 and S2 normal.  No murmurs appreciated.  Respiratory: Normal respiratory effort.  Clear to auscultation bilaterally.  Abdomen: Non-distended, soft  Skin: Warm, " dry, no rashes.  Musculoskeletal: Gait is normal.  Moves all extremities well.  Extremities: No leg edema.  Psych:  Affect/mood is normal, judgement is good, memory is intact, grooming is appropriate.    Assessment/Plan:     Tylor was seen today for diabetes.    Diagnoses and all orders for this visit:    Type 2 diabetes mellitus with hyperglycemia, without long-term current use of insulin (HCC)  Uncontrolled, discussed that his A1c was 13.3% today.  Discussed long-term effects of uncontrolled diabetes, management/treatment, medication options.  He absolutely refuses to use insulin to his job.  Will resume metformin and glipizide for now, follow-up in 3 months  -     POCT Hemoglobin A1C  -     CBC WITHOUT DIFFERENTIAL; Future  -     Comp Metabolic Panel; Future  -     MICROALBUMIN CREAT RATIO URINE; Future  -     metformin (GLUCOPHAGE) 1000 MG tablet; Take 1 Tab by mouth 2 times a day, with meals.  -     glipiZIDE (GLUCOTROL) 5 MG Tab; Take 1 Tab by mouth every morning before breakfast.    Hypertriglyceridemia  Elevated, discussed starting a statin.  Will recheck a lipid panel and consider starting one at his next visit.  -     Comp Metabolic Panel; Future  -     Lipid Profile; Future    Tobacco use  Advised to quit, patient is working on this    Obesity (BMI 35.0-39.9 without comorbidity)  -     Patient identified as having weight management issue.  Appropriate orders and counseling given.      Return in about 3 months (around 11/20/2019) for routine follow up.

## 2020-01-15 ENCOUNTER — OFFICE VISIT (OUTPATIENT)
Dept: URGENT CARE | Facility: CLINIC | Age: 45
End: 2020-01-15
Payer: COMMERCIAL

## 2020-01-15 VITALS
TEMPERATURE: 98.4 F | BODY MASS INDEX: 33.07 KG/M2 | WEIGHT: 231 LBS | SYSTOLIC BLOOD PRESSURE: 136 MMHG | DIASTOLIC BLOOD PRESSURE: 88 MMHG | RESPIRATION RATE: 16 BRPM | HEART RATE: 93 BPM | OXYGEN SATURATION: 96 % | HEIGHT: 70 IN

## 2020-01-15 DIAGNOSIS — Z86.59 HISTORY OF DEPRESSION: ICD-10-CM

## 2020-01-15 DIAGNOSIS — M54.2 NECK PAIN: ICD-10-CM

## 2020-01-15 DIAGNOSIS — Z86.59 HISTORY OF ANXIETY: ICD-10-CM

## 2020-01-15 PROCEDURE — 99213 OFFICE O/P EST LOW 20 MIN: CPT | Performed by: NURSE PRACTITIONER

## 2020-01-15 ASSESSMENT — ENCOUNTER SYMPTOMS
CHILLS: 0
NERVOUS/ANXIOUS: 1
TINGLING: 1
FEVER: 0
SHORTNESS OF BREATH: 0
HEADACHES: 0
NECK PAIN: 1

## 2020-01-16 NOTE — PROGRESS NOTES
"Subjective:   Tylor Hill is a 44 y.o. male who presents for Neck Pain (bilateral shoulder pain, depression and anxity medication refill )        HPI   1. Recurrent neck pain - was seen at [facility] ER and told have degenerative disc disease. Was started on Vicoden, muscle relaxer and steroids. States with mild tingling to bilateral fingers and difficulty gripping with right hand. Was told to follow up with Neurosurgery.     2. Hx of anxiety and depression. Has not been taking medications for several years. Asking for restart of medications. Denies suicidal thoughts or ideation.    Review of Systems   Constitutional: Negative for chills and fever.   Respiratory: Negative for shortness of breath.    Cardiovascular: Negative for chest pain.   Musculoskeletal: Positive for neck pain.   Skin: Negative for itching and rash.   Neurological: Positive for tingling. Negative for headaches.   Psychiatric/Behavioral: The patient is nervous/anxious.      Patient's PMH, SocHx, SurgHx, FamHx, Drug allergies and medications reviewed.     Objective:   /88   Pulse 93   Temp 36.9 °C (98.4 °F)   Resp 16   Ht 1.778 m (5' 10\")   Wt 104.8 kg (231 lb)   SpO2 96%   BMI 33.15 kg/m²   Physical Exam  Vitals signs reviewed.   Constitutional:       General: He is not in acute distress.     Appearance: He is well-developed. He is not diaphoretic.   HENT:      Head: Normocephalic.      Right Ear: Hearing normal.      Left Ear: Hearing normal.      Nose: Nose normal.   Eyes:      General: Lids are normal.      Conjunctiva/sclera: Conjunctivae normal.      Pupils: Pupils are equal, round, and reactive to light.   Neck:      Musculoskeletal: Normal range of motion.   Cardiovascular:      Rate and Rhythm: Normal rate and regular rhythm.      Heart sounds: Normal heart sounds.   Pulmonary:      Effort: Pulmonary effort is normal. No respiratory distress.      Breath sounds: Normal breath sounds. No decreased breath sounds or " wheezing.   Musculoskeletal:      Cervical back: He exhibits decreased range of motion, tenderness, pain and spasm. He exhibits no swelling and normal pulse.      Comments: Equal bilateral hand .  Decreased ROM when attempting to lift bilateral arms above head.   Skin:     General: Skin is warm.      Findings: No rash.   Neurological:      Mental Status: He is alert.      Comments: Neurovascular and sensation intact - bilateral upper extremities   Psychiatric:         Speech: Speech normal.         Behavior: Behavior normal.         Thought Content: Thought content normal.         Judgment: Judgment normal.           Assessment/Plan:   1. History of anxiety  - REFERRAL TO FOLLOW-UP WITH PRIMARY CARE    2. History of depression  - REFERRAL TO FOLLOW-UP WITH PRIMARY CARE    3. Neck pain  - REFERRAL TO ORTHOPEDICS  - REFERRAL TO NEUROSURGERY    Discussed will not start anxiety medications or depression medications since urgent care. Referral to PCP Urgent    Referral to Ortho and Neuro since was advised by [facility]. Continue with muscle relaxers and medications as prior.    Differential diagnosis, natural history, supportive care, and indications for immediate follow-up discussed.     **Please note that all invasive procedures during this visit were performed by myself and/or the Medical Assistant under the supervision of the PA or MD in office**

## 2020-09-18 ENCOUNTER — HOSPITAL ENCOUNTER (OUTPATIENT)
Facility: MEDICAL CENTER | Age: 45
End: 2020-09-18
Attending: PHYSICIAN ASSISTANT
Payer: COMMERCIAL

## 2020-09-18 ENCOUNTER — OFFICE VISIT (OUTPATIENT)
Dept: URGENT CARE | Facility: CLINIC | Age: 45
End: 2020-09-18
Payer: COMMERCIAL

## 2020-09-18 VITALS
RESPIRATION RATE: 18 BRPM | HEIGHT: 70 IN | SYSTOLIC BLOOD PRESSURE: 102 MMHG | DIASTOLIC BLOOD PRESSURE: 84 MMHG | TEMPERATURE: 98.6 F | BODY MASS INDEX: 31.5 KG/M2 | HEART RATE: 102 BPM | WEIGHT: 220 LBS | OXYGEN SATURATION: 97 %

## 2020-09-18 DIAGNOSIS — Z20.822 CLOSE EXPOSURE TO COVID-19 VIRUS: ICD-10-CM

## 2020-09-18 DIAGNOSIS — R05.9 COUGH: ICD-10-CM

## 2020-09-18 LAB
COVID ORDER STATUS COVID19: NORMAL
SARS-COV-2 RNA RESP QL NAA+PROBE: NOTDETECTED
SPECIMEN SOURCE: NORMAL

## 2020-09-18 PROCEDURE — C9803 HOPD COVID-19 SPEC COLLECT: HCPCS

## 2020-09-18 PROCEDURE — 99214 OFFICE O/P EST MOD 30 MIN: CPT | Mod: CS | Performed by: PHYSICIAN ASSISTANT

## 2020-09-18 PROCEDURE — U0003 INFECTIOUS AGENT DETECTION BY NUCLEIC ACID (DNA OR RNA); SEVERE ACUTE RESPIRATORY SYNDROME CORONAVIRUS 2 (SARS-COV-2) (CORONAVIRUS DISEASE [COVID-19]), AMPLIFIED PROBE TECHNIQUE, MAKING USE OF HIGH THROUGHPUT TECHNOLOGIES AS DESCRIBED BY CMS-2020-01-R: HCPCS

## 2020-09-18 RX ORDER — ALBUTEROL SULFATE 90 UG/1
2 AEROSOL, METERED RESPIRATORY (INHALATION) EVERY 6 HOURS PRN
Qty: 8.5 G | Refills: 2 | Status: SHIPPED | OUTPATIENT
Start: 2020-09-18 | End: 2021-07-12

## 2020-09-18 RX ORDER — BENZONATATE 100 MG/1
100 CAPSULE ORAL 3 TIMES DAILY PRN
Qty: 60 CAP | Refills: 0 | Status: SHIPPED | OUTPATIENT
Start: 2020-09-18 | End: 2020-09-26

## 2020-09-18 ASSESSMENT — ENCOUNTER SYMPTOMS
WHEEZING: 1
CHILLS: 0
COUGH: 1
NAUSEA: 1
SPUTUM PRODUCTION: 0
VOMITING: 0
FEVER: 0
FATIGUE: 1
SHORTNESS OF BREATH: 0
ABDOMINAL PAIN: 0
DIARRHEA: 0
HEADACHES: 1
SENSORY CHANGE: 0
BLURRED VISION: 0
SORE THROAT: 0
FOCAL WEAKNESS: 0

## 2020-09-18 NOTE — PROGRESS NOTES
Subjective:   Tylor Hill  is a 45 y.o. male who presents for Fatigue (x3 days ), Headache (x3 days), and Shortness of Breath (x3 days)      Fatigue  Associated symptoms include coughing, fatigue and nausea. Pertinent negatives include no abdominal pain, chills, congestion, fever, rash, sore throat or vomiting.   Headache   Associated symptoms include coughing and nausea. Pertinent negatives include no abdominal pain, blurred vision, ear pain, fever, sore throat or vomiting.   Shortness of Breath  Associated symptoms include wheezing. Pertinent negatives include no abdominal pain, ear pain, fever, rash, sore throat, sputum production or vomiting.   Patient comes clinic complaining of mild symptoms and some concern for exposure to COVID.  He states he felt fatigued while working this morning and did call into his employer to go home from work.  He is required to have COVID testing following this.  He denies fevers chills.  He noted mild headache.  He notes mild cough he feels is likely due to the smoke in the air.  Cough has been slightly wheezy.  He denies history of asthma bronchitis and thinks he may have had remote history of pneumonia.  He denies vomiting but notes intermittent nausea for the last few days.  Denies abdominal pain diarrhea or rash.  Denies specific exposures to COVID.  Patient notes heightened sense of smell, declines loss of taste.  Has tried treatment with Excedrin.  He declines shortness of breath but notes slight tightness with wheezing coughing.    Review of Systems   Constitutional: Positive for fatigue and malaise/fatigue. Negative for chills and fever.   HENT: Negative for congestion, ear pain and sore throat.    Eyes: Negative for blurred vision.   Respiratory: Positive for cough and wheezing. Negative for sputum production and shortness of breath.    Gastrointestinal: Positive for nausea. Negative for abdominal pain, diarrhea and vomiting.   Skin: Negative for rash.  "  Neurological: Negative for sensory change and focal weakness.       Allergies   Allergen Reactions   • Hydrocodone-Acetaminophen Hives        Objective:   /84   Pulse (!) 102   Temp 37 °C (98.6 °F) (Temporal)   Resp 18   Ht 1.778 m (5' 10\")   Wt 99.8 kg (220 lb)   SpO2 97%   BMI 31.57 kg/m²     Physical Exam  Vitals signs and nursing note reviewed.   Constitutional:       General: He is not in acute distress.     Appearance: He is well-developed. He is not diaphoretic.   HENT:      Head: Normocephalic and atraumatic.      Right Ear: Tympanic membrane, ear canal and external ear normal.      Left Ear: Tympanic membrane, ear canal and external ear normal.      Nose: Nose normal.      Mouth/Throat:      Pharynx: Uvula midline. Posterior oropharyngeal erythema ( mild PND) present. No oropharyngeal exudate.      Tonsils: No tonsillar abscesses.   Eyes:      General: No scleral icterus.        Right eye: No discharge.         Left eye: No discharge.      Conjunctiva/sclera: Conjunctivae normal.   Neck:      Musculoskeletal: Neck supple.   Pulmonary:      Effort: Pulmonary effort is normal. No respiratory distress.      Breath sounds: No decreased breath sounds, wheezing, rhonchi or rales.   Musculoskeletal: Normal range of motion.   Lymphadenopathy:      Cervical: No cervical adenopathy.   Skin:     General: Skin is warm and dry.      Coloration: Skin is not pale.   Neurological:      Mental Status: He is alert and oriented to person, place, and time.      Coordination: Coordination normal.     COVID test pending    Assessment/Plan:   1. Cough  - COVID/SARS COV-2 PCR; Future  - benzonatate (TESSALON) 100 MG Cap; Take 1 Cap by mouth 3 times a day as needed for Cough.  Dispense: 60 Cap; Refill: 0  - albuterol 108 (90 Base) MCG/ACT Aero Soln inhalation aerosol; Inhale 2 Puffs by mouth every 6 hours as needed for Shortness of Breath.  Dispense: 8.5 g; Refill: 2    2. Close exposure to COVID-19 virus  - " COVID/SARS COV-2 PCR; Future  Supportive care is reviewed with patient/caregiver - recommend to push PO fluids and electrolytes, over-the-counter symptom support medications reviewed, ER precautions with worsened symptoms, quarantine recommendations reviewed, sent with letter, [place] for results of testing  Return to clinic with lack of resolution or progression of symptoms.  ER precautions with any worsening symptoms are reviewed with patient/caregiver and they do express understanding      I have worn an N95 mask, gloves and eye protection for the entire encounter with this patient.     Differential diagnosis, natural history, supportive care, and indications for immediate follow-up discussed.

## 2020-09-18 NOTE — LETTER
September 18, 2020       Patient: Tylor Hill   YOB: 1975   Date of Visit: 9/18/2020          To Whom it May Concern,   Your employee was seen in our clinic today. A concern for COVID-19 has been identified and testing is in progress.?   ?  We are asking you to excuse absences while following self-isolation protocol per Center for Disease Control (CDC) guidelines. Your employee will be able to access test results through our electronic delivery system called [employer].?   ?  If the results of testing are negative, and once there has been no fever (temperature >100.4 F) for at least 72 hours without treatment, and no vomiting or diarrhea for at least 48 hours, then return to work is approved.   ?  If the results of testing are positive then your employee will be contacted by the [facility] or [facility] for further instructions on duration of self-isolation and return to work protocol. In general, this will also follow the CDC guidelines with a minimum of 10 days from the onset of symptoms and without fever, vomiting, or diarrhea as above.?   ?  In general, repeat testing is not necessary and not offered through our [facility] care.?   ?  This is the only note that will be provided from [facility] for this visit. Your employee will require an appointment with a primary care provider if FMLA or disability forms are required.   ?  Sincerely,?         Kobe De Dios P.A.-C.  Electronically Signed

## 2020-09-26 ENCOUNTER — OFFICE VISIT (OUTPATIENT)
Dept: URGENT CARE | Facility: CLINIC | Age: 45
End: 2020-09-26
Payer: COMMERCIAL

## 2020-09-26 VITALS
OXYGEN SATURATION: 96 % | HEIGHT: 70 IN | HEART RATE: 100 BPM | WEIGHT: 210 LBS | TEMPERATURE: 98.4 F | BODY MASS INDEX: 30.06 KG/M2 | DIASTOLIC BLOOD PRESSURE: 74 MMHG | SYSTOLIC BLOOD PRESSURE: 128 MMHG | RESPIRATION RATE: 12 BRPM

## 2020-09-26 DIAGNOSIS — R05.9 COUGH: ICD-10-CM

## 2020-09-26 PROCEDURE — 99213 OFFICE O/P EST LOW 20 MIN: CPT | Performed by: FAMILY MEDICINE

## 2020-09-26 ASSESSMENT — ENCOUNTER SYMPTOMS
VOMITING: 0
COUGH: 1
SHORTNESS OF BREATH: 0
FEVER: 0
WHEEZING: 1
HEADACHES: 1

## 2020-09-26 NOTE — LETTER
September 26, 2020      To Whom It May Concern:           This is confirmation that Tylor Aden Camposulley attended his scheduled appointment with Hussein Red M.D. on 9/26/20.  Please excuse him from work.  He is anticipated to be well enough to return to work on 9/28/2020.             If you have any questions please do not hesitate to call me at the phone number listed below.      Sincerely,          Hussein Red M.D.  [phone]
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
## 2021-07-12 ENCOUNTER — OFFICE VISIT (OUTPATIENT)
Dept: MEDICAL GROUP | Facility: MEDICAL CENTER | Age: 46
End: 2021-07-12
Payer: OTHER MISCELLANEOUS

## 2021-07-12 VITALS
HEART RATE: 117 BPM | SYSTOLIC BLOOD PRESSURE: 120 MMHG | HEIGHT: 70 IN | BODY MASS INDEX: 30.78 KG/M2 | DIASTOLIC BLOOD PRESSURE: 72 MMHG | TEMPERATURE: 97.9 F | RESPIRATION RATE: 16 BRPM | WEIGHT: 215 LBS | OXYGEN SATURATION: 95 %

## 2021-07-12 DIAGNOSIS — E11.65 TYPE 2 DIABETES MELLITUS WITH HYPERGLYCEMIA, WITHOUT LONG-TERM CURRENT USE OF INSULIN (HCC): ICD-10-CM

## 2021-07-12 DIAGNOSIS — Z72.0 CHEWING TOBACCO USE: ICD-10-CM

## 2021-07-12 DIAGNOSIS — E78.1 HYPERTRIGLYCERIDEMIA: ICD-10-CM

## 2021-07-12 DIAGNOSIS — E66.9 OBESITY (BMI 30-39.9): ICD-10-CM

## 2021-07-12 LAB
HBA1C MFR BLD: 12.8 % (ref 0–5.6)
INT CON NEG: NEGATIVE
INT CON POS: POSITIVE

## 2021-07-12 PROCEDURE — 99214 OFFICE O/P EST MOD 30 MIN: CPT | Performed by: FAMILY MEDICINE

## 2021-07-12 PROCEDURE — 83036 HEMOGLOBIN GLYCOSYLATED A1C: CPT | Performed by: FAMILY MEDICINE

## 2021-07-12 RX ORDER — GLIMEPIRIDE 4 MG/1
4 TABLET ORAL 2 TIMES DAILY
Qty: 180 TABLET | Refills: 1 | Status: SHIPPED | OUTPATIENT
Start: 2021-07-12 | End: 2022-09-20

## 2021-07-12 RX ORDER — LANCETS 30 GAUGE
EACH MISCELLANEOUS
Qty: 100 EACH | Refills: 11 | Status: SHIPPED | OUTPATIENT
Start: 2021-07-12 | End: 2022-09-20

## 2021-07-12 ASSESSMENT — PATIENT HEALTH QUESTIONNAIRE - PHQ9: CLINICAL INTERPRETATION OF PHQ2 SCORE: 0

## 2021-07-12 NOTE — PROGRESS NOTES
"  Subjective:     Tylor Hill is a 45 y.o. male presenting for a follow-up of his diabetes.  He now has regular insurance and is driving locally now, is able to focus on his medical issues now.  His blood sugars remain elevated.  His A1c today is 12.8%.  He occasionally has polydipsia and polyuria.  Has been losing some weight.  Denies any numbness or tingling in his feet.  His last eye exam was last year, plans to schedule an appointment soon.  Denies any chest pain, shortness of breath, lightheadedness, dizziness, leg swelling.  He continues to chew tobacco.  Is not interested in starting a statin right now, would like to do his labs first.  He did not tolerate Metformin in the past, gave him GI side effects, sexual side effects, overall felt unwell on this.  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        Current Outpatient Medications:   •  glimepiride (AMARYL) 4 MG Tab, Take 1 tablet by mouth 2 times a day., Disp: 180 tablet, Rfl: 1  •  Blood Glucose Test Strips, Test strips order: use one test strip to check blood sugars 1x/day. Dx e11.65, Disp: 100 Strip, Rfl: 11  •  Blood Glucose Monitoring Suppl Device, Meter: Dispense Device of Insurance Preference. Sig. Use to check blood sugars 1x/day. Dx e11.65, Disp: 1 Each, Rfl: 0  •  Lancets, Lancets order: use one lancet to check blood sugars 1x/day. Dx e11.65, Disp: 100 Each, Rfl: 11  •  aspirin EC (ECOTRIN) 81 MG Tablet Delayed Response, Take 81 mg by mouth every day., Disp: , Rfl:   •  Ibuprofen-diphenhydrAMINE Cit (ADVIL PM PO), Take  by mouth., Disp: , Rfl:     Objective:     Vitals: /72 (BP Location: Left arm, Patient Position: Sitting, BP Cuff Size: Adult)   Pulse (!) 117   Temp 36.6 °C (97.9 °F) (Temporal)   Resp 16   Ht 1.778 m (5' 10\")   Wt 97.5 kg (215 lb)   SpO2 95%   BMI 30.85 kg/m²   General: Alert  HEENT: Normocephalic.   Heart: Regular rate and rhythm.  S1 and S2 normal.  No murmurs appreciated.  Respiratory: " Normal respiratory effort.  Clear to auscultation bilaterally.  Abdomen: Non-distended, soft  Extremities: No leg edema.    Assessment/Plan:     Tylor was seen today for follow-up.    Diagnoses and all orders for this visit:    Type 2 diabetes mellitus with hyperglycemia, without long-term current use of insulin (HCC)  Chronic, progressing.  We will start with glimepiride 2 mg daily and titrate up to 4 mg twice a day.  Will send patient a [employer] message to check in, will likely add a GLP-1 agonist or SGLT2 inhibitor next.  Discussed potential side effects, hypoglycemic episodes.  Recommended checking his blood sugars once a day.  He will do the following labs.  Follow-up in 3 months  -     POCT Hemoglobin A1C  -     glimepiride (AMARYL) 4 MG Tab; Take 1 tablet by mouth 2 times a day.  -     Blood Glucose Test Strips; Test strips order: use one test strip to check blood sugars 1x/day. Dx e11.65  -     Blood Glucose Monitoring Suppl Device; Meter: Dispense Device of Insurance Preference. Sig. Use to check blood sugars 1x/day. Dx e11.65  -     Lancets; Lancets order: use one lancet to check blood sugars 1x/day. Dx e11.65  -     CBC WITHOUT DIFFERENTIAL; Future  -     Comp Metabolic Panel; Future  -     MICROALBUMIN CREAT RATIO URINE; Future    Hypertriglyceridemia  Chronic, possibly stable.  We discussed starting a statin, he declined for now.  -     Comp Metabolic Panel; Future  -     Lipid Profile; Future    Chewing tobacco use  Chronic, stable.  Advised to quit, patient is working on this.    Obesity (BMI 30-39.9)  -     Patient identified as having weight management issue.  Appropriate orders and counseling given.            Return in about 3 months (around 10/12/2021) for routine follow up.

## 2021-08-27 NOTE — ED NOTES
Around 10:15 noticed left sided hand and arm weakness and numbness/paresthesias. She reports vision change early today.  She took a 324 mg ASA, takes a baby aspirin daily.   Couple of days ago had a fall and hit her head on the right side. Denies a headache at this time, No loss of consciousness.   Report given to Ryanne CLANCY

## 2022-09-19 SDOH — ECONOMIC STABILITY: INCOME INSECURITY: HOW HARD IS IT FOR YOU TO PAY FOR THE VERY BASICS LIKE FOOD, HOUSING, MEDICAL CARE, AND HEATING?: HARD

## 2022-09-19 SDOH — HEALTH STABILITY: MENTAL HEALTH
STRESS IS WHEN SOMEONE FEELS TENSE, NERVOUS, ANXIOUS, OR CAN'T SLEEP AT NIGHT BECAUSE THEIR MIND IS TROUBLED. HOW STRESSED ARE YOU?: VERY MUCH

## 2022-09-19 SDOH — ECONOMIC STABILITY: TRANSPORTATION INSECURITY
IN THE PAST 12 MONTHS, HAS THE LACK OF TRANSPORTATION KEPT YOU FROM MEDICAL APPOINTMENTS OR FROM GETTING MEDICATIONS?: YES

## 2022-09-19 SDOH — HEALTH STABILITY: PHYSICAL HEALTH: ON AVERAGE, HOW MANY DAYS PER WEEK DO YOU ENGAGE IN MODERATE TO STRENUOUS EXERCISE (LIKE A BRISK WALK)?: 7 DAYS

## 2022-09-19 SDOH — ECONOMIC STABILITY: HOUSING INSECURITY

## 2022-09-19 SDOH — ECONOMIC STABILITY: INCOME INSECURITY: IN THE LAST 12 MONTHS, WAS THERE A TIME WHEN YOU WERE NOT ABLE TO PAY THE MORTGAGE OR RENT ON TIME?: YES

## 2022-09-19 SDOH — HEALTH STABILITY: PHYSICAL HEALTH: ON AVERAGE, HOW MANY MINUTES DO YOU ENGAGE IN EXERCISE AT THIS LEVEL?: PATIENT DECLINED

## 2022-09-19 SDOH — ECONOMIC STABILITY: TRANSPORTATION INSECURITY
IN THE PAST 12 MONTHS, HAS LACK OF RELIABLE TRANSPORTATION KEPT YOU FROM MEDICAL APPOINTMENTS, MEETINGS, WORK OR FROM GETTING THINGS NEEDED FOR DAILY LIVING?: YES

## 2022-09-19 SDOH — ECONOMIC STABILITY: FOOD INSECURITY: WITHIN THE PAST 12 MONTHS, YOU WORRIED THAT YOUR FOOD WOULD RUN OUT BEFORE YOU GOT MONEY TO BUY MORE.: SOMETIMES TRUE

## 2022-09-19 SDOH — ECONOMIC STABILITY: HOUSING INSECURITY
IN THE LAST 12 MONTHS, WAS THERE A TIME WHEN YOU DID NOT HAVE A STEADY PLACE TO SLEEP OR SLEPT IN A SHELTER (INCLUDING NOW)?: YES

## 2022-09-19 SDOH — ECONOMIC STABILITY: FOOD INSECURITY: WITHIN THE PAST 12 MONTHS, THE FOOD YOU BOUGHT JUST DIDN'T LAST AND YOU DIDN'T HAVE MONEY TO GET MORE.: OFTEN TRUE

## 2022-09-19 SDOH — ECONOMIC STABILITY: HOUSING INSECURITY
IN THE LAST 12 MONTHS, WAS THERE A TIME WHEN YOU DID NOT HAVE A STEADY PLACE TO SLEEP OR SLEPT IN A SHELTER (INCLUDING NOW)?: PATIENT REFUSED

## 2022-09-19 SDOH — ECONOMIC STABILITY: TRANSPORTATION INSECURITY
IN THE PAST 12 MONTHS, HAS LACK OF TRANSPORTATION KEPT YOU FROM MEETINGS, WORK, OR FROM GETTING THINGS NEEDED FOR DAILY LIVING?: YES

## 2022-09-19 ASSESSMENT — LIFESTYLE VARIABLES
AUDIT-C TOTAL SCORE: 0
HOW MANY STANDARD DRINKS CONTAINING ALCOHOL DO YOU HAVE ON A TYPICAL DAY: PATIENT DOES NOT DRINK
SKIP TO QUESTIONS 9-10: 1
HOW OFTEN DO YOU HAVE SIX OR MORE DRINKS ON ONE OCCASION: NEVER
HOW OFTEN DO YOU HAVE A DRINK CONTAINING ALCOHOL: NEVER

## 2022-09-19 ASSESSMENT — SOCIAL DETERMINANTS OF HEALTH (SDOH)
HOW OFTEN DO YOU HAVE SIX OR MORE DRINKS ON ONE OCCASION: NEVER
HOW OFTEN DO YOU GET TOGETHER WITH FRIENDS OR RELATIVES?: PATIENT DECLINED
HOW OFTEN DO YOU ATTENT MEETINGS OF THE CLUB OR ORGANIZATION YOU BELONG TO?: PATIENT DECLINED
HOW OFTEN DO YOU ATTENT MEETINGS OF THE CLUB OR ORGANIZATION YOU BELONG TO?: PATIENT DECLINED
HOW MANY DRINKS CONTAINING ALCOHOL DO YOU HAVE ON A TYPICAL DAY WHEN YOU ARE DRINKING: PATIENT DOES NOT DRINK
HOW OFTEN DO YOU HAVE A DRINK CONTAINING ALCOHOL: NEVER
IN A TYPICAL WEEK, HOW MANY TIMES DO YOU TALK ON THE PHONE WITH FAMILY, FRIENDS, OR NEIGHBORS?: PATIENT DECLINED
HOW OFTEN DO YOU ATTEND CHURCH OR RELIGIOUS SERVICES?: PATIENT DECLINED
DO YOU BELONG TO ANY CLUBS OR ORGANIZATIONS SUCH AS CHURCH GROUPS UNIONS, FRATERNAL OR ATHLETIC GROUPS, OR SCHOOL GROUPS?: PATIENT DECLINED
DO YOU BELONG TO ANY CLUBS OR ORGANIZATIONS SUCH AS CHURCH GROUPS UNIONS, FRATERNAL OR ATHLETIC GROUPS, OR SCHOOL GROUPS?: PATIENT DECLINED
WITHIN THE PAST 12 MONTHS, YOU WORRIED THAT YOUR FOOD WOULD RUN OUT BEFORE YOU GOT THE MONEY TO BUY MORE: SOMETIMES TRUE
IN A TYPICAL WEEK, HOW MANY TIMES DO YOU TALK ON THE PHONE WITH FAMILY, FRIENDS, OR NEIGHBORS?: PATIENT DECLINED
HOW HARD IS IT FOR YOU TO PAY FOR THE VERY BASICS LIKE FOOD, HOUSING, MEDICAL CARE, AND HEATING?: HARD
HOW OFTEN DO YOU ATTEND CHURCH OR RELIGIOUS SERVICES?: PATIENT DECLINED
HOW OFTEN DO YOU GET TOGETHER WITH FRIENDS OR RELATIVES?: PATIENT DECLINED

## 2022-09-20 ENCOUNTER — OFFICE VISIT (OUTPATIENT)
Dept: MEDICAL GROUP | Facility: MEDICAL CENTER | Age: 47
End: 2022-09-20
Payer: COMMERCIAL

## 2022-09-20 VITALS
OXYGEN SATURATION: 97 % | RESPIRATION RATE: 16 BRPM | BODY MASS INDEX: 31.5 KG/M2 | DIASTOLIC BLOOD PRESSURE: 78 MMHG | TEMPERATURE: 97.6 F | SYSTOLIC BLOOD PRESSURE: 144 MMHG | HEIGHT: 70 IN | WEIGHT: 220 LBS | HEART RATE: 74 BPM

## 2022-09-20 DIAGNOSIS — E78.1 HYPERTRIGLYCERIDEMIA: ICD-10-CM

## 2022-09-20 DIAGNOSIS — E11.65 TYPE 2 DIABETES MELLITUS WITH HYPERGLYCEMIA, WITHOUT LONG-TERM CURRENT USE OF INSULIN (HCC): ICD-10-CM

## 2022-09-20 DIAGNOSIS — Z23 NEED FOR VACCINATION: ICD-10-CM

## 2022-09-20 DIAGNOSIS — M54.12 CERVICAL RADICULOPATHY: ICD-10-CM

## 2022-09-20 DIAGNOSIS — Z72.0 CHEWING TOBACCO USE: ICD-10-CM

## 2022-09-20 DIAGNOSIS — M48.02 CERVICAL STENOSIS OF SPINE: ICD-10-CM

## 2022-09-20 DIAGNOSIS — E66.9 OBESITY (BMI 30-39.9): ICD-10-CM

## 2022-09-20 DIAGNOSIS — F41.9 ANXIETY: ICD-10-CM

## 2022-09-20 PROCEDURE — 90677 PCV20 VACCINE IM: CPT | Performed by: FAMILY MEDICINE

## 2022-09-20 PROCEDURE — 90686 IIV4 VACC NO PRSV 0.5 ML IM: CPT | Performed by: FAMILY MEDICINE

## 2022-09-20 PROCEDURE — 90472 IMMUNIZATION ADMIN EACH ADD: CPT | Performed by: FAMILY MEDICINE

## 2022-09-20 PROCEDURE — 90471 IMMUNIZATION ADMIN: CPT | Performed by: FAMILY MEDICINE

## 2022-09-20 PROCEDURE — 99214 OFFICE O/P EST MOD 30 MIN: CPT | Mod: 25 | Performed by: FAMILY MEDICINE

## 2022-09-20 RX ORDER — ESCITALOPRAM OXALATE 10 MG/1
10 TABLET ORAL DAILY
Qty: 90 TABLET | Refills: 1 | Status: SHIPPED | OUTPATIENT
Start: 2022-09-20 | End: 2023-03-14 | Stop reason: SDUPTHER

## 2022-09-20 RX ORDER — LANCETS 30 GAUGE
EACH MISCELLANEOUS
Qty: 100 EACH | Refills: 11 | Status: SHIPPED | OUTPATIENT
Start: 2022-09-20 | End: 2022-09-28 | Stop reason: SDUPTHER

## 2022-09-20 ASSESSMENT — PATIENT HEALTH QUESTIONNAIRE - PHQ9
5. POOR APPETITE OR OVEREATING: 1 - SEVERAL DAYS
SUM OF ALL RESPONSES TO PHQ QUESTIONS 1-9: 10
CLINICAL INTERPRETATION OF PHQ2 SCORE: 2

## 2022-09-20 NOTE — PROGRESS NOTES
"  Subjective:     Tylor Hill is a 47 y.o. male presenting for a follow-up.  Is now motivated to manage his diabetes, life situation also seems to have improved.  He was going through difficult time previously, had insurance, housing issues.  He reports that his blood sugars remain elevated.  Is currently not on any medications.  His blood pressure is somewhat elevated today, but he reports that it is normally normal at home.  He does have a fair amount of anxiety at baseline, is wondering about medication options for this.  He feels mildly depressed, denies any suicidal thoughts.  PHQ-9 is 10 today.  He would also like a referral back to neurosurgery.  He was seeing Dr. Wilson previously, who recommended surgery for stenosis and a disc bulge in his neck.  He would like a second opinion.        Current Outpatient Medications:     Blood Glucose Test Strips, Test strips order: use one test strip to check blood sugars 1x/day. Dx e11.65, Disp: 100 Strip, Rfl: 11    Blood Glucose Monitoring Suppl Device, Meter: Dispense Device of Insurance Preference. Sig. Use to check blood sugars 1x/day. Dx e11.65, Disp: 1 Each, Rfl: 0    Lancets, Lancets order: use one lancet to check blood sugars 1x/day. Dx e11.65, Disp: 100 Each, Rfl: 11    Acetaminophen (TYLENOL PO), Take  by mouth., Disp: , Rfl:     escitalopram (LEXAPRO) 10 MG Tab, Take 1 Tablet by mouth every day., Disp: 90 Tablet, Rfl: 1    Objective:     Vitals: BP (!) 144/78   Pulse 74   Temp 36.4 °C (97.6 °F)   Resp 16   Ht 1.778 m (5' 10\")   Wt 99.8 kg (220 lb)   SpO2 97%   BMI 31.57 kg/m²   General: Alert  HEENT: Normocephalic.   Heart: Regular rate and rhythm.  S1 and S2 normal.  No murmurs appreciated.  Respiratory: Normal respiratory effort.  Clear to auscultation bilaterally.  Abdomen: Non-distended, soft  Extremities: No leg edema.    Assessment/Plan:     Tylor was seen today for follow-up.    Diagnoses and all orders for this visit:    Type 2 " diabetes mellitus with hyperglycemia, without long-term current use of insulin (HCC)  Chronic, uncontrolled.  We will check the following labs.  He was unable to tolerate metformin in the past.  Will send in a prescription for glimepiride once his labs return.  Follow-up in 2 months  -     CBC WITHOUT DIFFERENTIAL; Future  -     Comp Metabolic Panel; Future  -     HEMOGLOBIN A1C; Future  -     MICROALBUMIN CREAT RATIO URINE; Future  -     Blood Glucose Test Strips; Test strips order: use one test strip to check blood sugars 1x/day. Dx e11.65  -     Blood Glucose Monitoring Suppl Device; Meter: Dispense Device of Insurance Preference. Sig. Use to check blood sugars 1x/day. Dx e11.65  -     Lancets; Lancets order: use one lancet to check blood sugars 1x/day. Dx e11.65    Hypertriglyceridemia  Chronic, uncontrolled, will recheck  -     Comp Metabolic Panel; Future  -     Lipid Profile; Future    Obesity (BMI 30-39.9)  -     Patient identified as having weight management issue.  Appropriate orders and counseling given.    Cervical stenosis of spine  Cervical radiculopathy  Chronic, worsening, new referral placed  -     Referral to Neurosurgery    Chewing tobacco use  Advised to quit, patient is working on this    Anxiety  Chronic, worsening, we discussed medication options.  We will try Lexapro.  -     escitalopram (LEXAPRO) 10 MG Tab; Take 1 Tablet by mouth every day.    Need for vaccination  -     INFLUENZA VACCINE QUAD INJ (PF)  -     Pneumococcal Conjugate Vaccine 20-Valent (19 yrs+)        Return in about 2 months (around 11/20/2022) for routine follow up.

## 2022-09-24 ENCOUNTER — HOSPITAL ENCOUNTER (OUTPATIENT)
Dept: LAB | Facility: MEDICAL CENTER | Age: 47
End: 2022-09-24
Attending: FAMILY MEDICINE
Payer: COMMERCIAL

## 2022-09-24 DIAGNOSIS — E11.65 TYPE 2 DIABETES MELLITUS WITH HYPERGLYCEMIA, WITHOUT LONG-TERM CURRENT USE OF INSULIN (HCC): ICD-10-CM

## 2022-09-24 DIAGNOSIS — E78.1 HYPERTRIGLYCERIDEMIA: ICD-10-CM

## 2022-09-24 LAB
ALBUMIN SERPL BCP-MCNC: 4.6 G/DL (ref 3.2–4.9)
ALBUMIN/GLOB SERPL: 1.7 G/DL
ALP SERPL-CCNC: 128 U/L (ref 30–99)
ALT SERPL-CCNC: 13 U/L (ref 2–50)
ANION GAP SERPL CALC-SCNC: 13 MMOL/L (ref 7–16)
AST SERPL-CCNC: 12 U/L (ref 12–45)
BILIRUB SERPL-MCNC: 0.4 MG/DL (ref 0.1–1.5)
BUN SERPL-MCNC: 10 MG/DL (ref 8–22)
CALCIUM SERPL-MCNC: 9.4 MG/DL (ref 8.5–10.5)
CHLORIDE SERPL-SCNC: 100 MMOL/L (ref 96–112)
CHOLEST SERPL-MCNC: 178 MG/DL (ref 100–199)
CO2 SERPL-SCNC: 21 MMOL/L (ref 20–33)
CREAT SERPL-MCNC: 0.7 MG/DL (ref 0.5–1.4)
CREAT UR-MCNC: 50.35 MG/DL
ERYTHROCYTE [DISTWIDTH] IN BLOOD BY AUTOMATED COUNT: 39.8 FL (ref 35.9–50)
EST. AVERAGE GLUCOSE BLD GHB EST-MCNC: 306 MG/DL
FASTING STATUS PATIENT QL REPORTED: NORMAL
GFR SERPLBLD CREATININE-BSD FMLA CKD-EPI: 113 ML/MIN/1.73 M 2
GLOBULIN SER CALC-MCNC: 2.7 G/DL (ref 1.9–3.5)
GLUCOSE SERPL-MCNC: 417 MG/DL (ref 65–99)
HBA1C MFR BLD: 12.3 % (ref 4–5.6)
HCT VFR BLD AUTO: 49.6 % (ref 42–52)
HDLC SERPL-MCNC: 40 MG/DL
HGB BLD-MCNC: 17.3 G/DL (ref 14–18)
LDLC SERPL CALC-MCNC: 114 MG/DL
MCH RBC QN AUTO: 30.8 PG (ref 27–33)
MCHC RBC AUTO-ENTMCNC: 34.9 G/DL (ref 33.7–35.3)
MCV RBC AUTO: 88.3 FL (ref 81.4–97.8)
MICROALBUMIN UR-MCNC: 1.9 MG/DL
MICROALBUMIN/CREAT UR: 38 MG/G (ref 0–30)
PLATELET # BLD AUTO: 285 K/UL (ref 164–446)
PMV BLD AUTO: 11.1 FL (ref 9–12.9)
POTASSIUM SERPL-SCNC: 4.6 MMOL/L (ref 3.6–5.5)
PROT SERPL-MCNC: 7.3 G/DL (ref 6–8.2)
RBC # BLD AUTO: 5.62 M/UL (ref 4.7–6.1)
SODIUM SERPL-SCNC: 134 MMOL/L (ref 135–145)
TRIGL SERPL-MCNC: 122 MG/DL (ref 0–149)
WBC # BLD AUTO: 7.4 K/UL (ref 4.8–10.8)

## 2022-09-24 PROCEDURE — 80061 LIPID PANEL: CPT

## 2022-09-24 PROCEDURE — 85027 COMPLETE CBC AUTOMATED: CPT

## 2022-09-24 PROCEDURE — 82043 UR ALBUMIN QUANTITATIVE: CPT

## 2022-09-24 PROCEDURE — 80053 COMPREHEN METABOLIC PANEL: CPT

## 2022-09-24 PROCEDURE — 36415 COLL VENOUS BLD VENIPUNCTURE: CPT

## 2022-09-24 PROCEDURE — 82570 ASSAY OF URINE CREATININE: CPT

## 2022-09-24 PROCEDURE — 83036 HEMOGLOBIN GLYCOSYLATED A1C: CPT

## 2022-09-26 RX ORDER — GLIMEPIRIDE 2 MG/1
2 TABLET ORAL EVERY MORNING
Qty: 90 TABLET | Refills: 0 | Status: SHIPPED | OUTPATIENT
Start: 2022-09-26 | End: 2022-09-28 | Stop reason: SDUPTHER

## 2022-09-28 DIAGNOSIS — E11.65 TYPE 2 DIABETES MELLITUS WITH HYPERGLYCEMIA, WITHOUT LONG-TERM CURRENT USE OF INSULIN (HCC): ICD-10-CM

## 2022-09-28 RX ORDER — LANCETS 30 GAUGE
EACH MISCELLANEOUS
Qty: 100 EACH | Refills: 11 | Status: SHIPPED
Start: 2022-09-28 | End: 2023-06-06

## 2022-09-28 RX ORDER — GLIMEPIRIDE 2 MG/1
2 TABLET ORAL EVERY MORNING
Qty: 90 TABLET | Refills: 0 | Status: SHIPPED
Start: 2022-09-28 | End: 2023-06-06

## 2022-10-20 ENCOUNTER — APPOINTMENT (OUTPATIENT)
Dept: RADIOLOGY | Facility: MEDICAL CENTER | Age: 47
End: 2022-10-20
Attending: STUDENT IN AN ORGANIZED HEALTH CARE EDUCATION/TRAINING PROGRAM
Payer: COMMERCIAL

## 2022-10-25 ENCOUNTER — APPOINTMENT (OUTPATIENT)
Dept: RADIOLOGY | Facility: MEDICAL CENTER | Age: 47
End: 2022-10-25
Attending: STUDENT IN AN ORGANIZED HEALTH CARE EDUCATION/TRAINING PROGRAM
Payer: COMMERCIAL

## 2023-03-14 RX ORDER — ESCITALOPRAM OXALATE 10 MG/1
10 TABLET ORAL DAILY
Qty: 90 TABLET | Refills: 1 | Status: SHIPPED | OUTPATIENT
Start: 2023-03-14 | End: 2023-06-06 | Stop reason: SDUPTHER

## 2023-06-06 ENCOUNTER — OFFICE VISIT (OUTPATIENT)
Dept: MEDICAL GROUP | Facility: MEDICAL CENTER | Age: 48
End: 2023-06-06
Payer: COMMERCIAL

## 2023-06-06 VITALS
BODY MASS INDEX: 30.35 KG/M2 | WEIGHT: 212 LBS | OXYGEN SATURATION: 98 % | HEIGHT: 70 IN | SYSTOLIC BLOOD PRESSURE: 128 MMHG | HEART RATE: 94 BPM | TEMPERATURE: 97.6 F | RESPIRATION RATE: 16 BRPM | DIASTOLIC BLOOD PRESSURE: 78 MMHG

## 2023-06-06 DIAGNOSIS — Z12.11 SCREEN FOR COLON CANCER: ICD-10-CM

## 2023-06-06 DIAGNOSIS — E78.1 HYPERTRIGLYCERIDEMIA: ICD-10-CM

## 2023-06-06 DIAGNOSIS — Z72.0 CHEWING TOBACCO USE: ICD-10-CM

## 2023-06-06 DIAGNOSIS — M54.12 CERVICAL RADICULOPATHY: ICD-10-CM

## 2023-06-06 DIAGNOSIS — R80.9 MICROALBUMINURIA: ICD-10-CM

## 2023-06-06 DIAGNOSIS — E66.9 OBESITY (BMI 30-39.9): ICD-10-CM

## 2023-06-06 DIAGNOSIS — F41.9 ANXIETY: ICD-10-CM

## 2023-06-06 DIAGNOSIS — E11.65 TYPE 2 DIABETES MELLITUS WITH HYPERGLYCEMIA, WITHOUT LONG-TERM CURRENT USE OF INSULIN (HCC): ICD-10-CM

## 2023-06-06 DIAGNOSIS — Z11.59 NEED FOR HEPATITIS C SCREENING TEST: ICD-10-CM

## 2023-06-06 PROBLEM — E78.2 MIXED HYPERLIPIDEMIA: Status: ACTIVE | Noted: 2023-06-06

## 2023-06-06 LAB
HBA1C MFR BLD: 13.4 % (ref ?–5.8)
POCT INT CON NEG: NEGATIVE
POCT INT CON POS: POSITIVE

## 2023-06-06 PROCEDURE — 3078F DIAST BP <80 MM HG: CPT | Performed by: FAMILY MEDICINE

## 2023-06-06 PROCEDURE — 3074F SYST BP LT 130 MM HG: CPT | Performed by: FAMILY MEDICINE

## 2023-06-06 PROCEDURE — 99214 OFFICE O/P EST MOD 30 MIN: CPT | Performed by: FAMILY MEDICINE

## 2023-06-06 PROCEDURE — 83036 HEMOGLOBIN GLYCOSYLATED A1C: CPT | Performed by: FAMILY MEDICINE

## 2023-06-06 RX ORDER — GABAPENTIN 300 MG/1
300 CAPSULE ORAL 3 TIMES DAILY
Qty: 270 CAPSULE | Refills: 1 | Status: SHIPPED | OUTPATIENT
Start: 2023-06-06

## 2023-06-06 RX ORDER — GLIMEPIRIDE 4 MG/1
4 TABLET ORAL 2 TIMES DAILY
Qty: 180 TABLET | Refills: 1 | Status: SHIPPED | OUTPATIENT
Start: 2023-06-06

## 2023-06-06 RX ORDER — LOSARTAN POTASSIUM 25 MG/1
25 TABLET ORAL DAILY
Qty: 30 TABLET | Status: CANCELLED | OUTPATIENT
Start: 2023-06-06

## 2023-06-06 RX ORDER — ESCITALOPRAM OXALATE 10 MG/1
10 TABLET ORAL DAILY
Qty: 90 TABLET | Refills: 1 | Status: SHIPPED | OUTPATIENT
Start: 2023-06-06 | End: 2023-10-03

## 2023-06-06 ASSESSMENT — FIBROSIS 4 INDEX: FIB4 SCORE: 0.57

## 2023-06-06 ASSESSMENT — PATIENT HEALTH QUESTIONNAIRE - PHQ9: CLINICAL INTERPRETATION OF PHQ2 SCORE: 0

## 2023-06-06 NOTE — LETTER
[employer] [facility]  Luz Peña M.D.  [address]  [place] [address]  Fax: [phone]   Authorization for Release/Disclosure of   Protected Health Information   Name: TYLOR ACEVEDO : 1975 SSN: [id number]   Address: [address] [place] [address] [place] [address] Phone:    [phone] (home)    I authorize the entity listed below to release/disclose the PHI below to:   [facility]/Luz Peña M.D. and Luz Peña M.D.   Provider or Entity Name:   [facility]   Address   City, State, Zip   Phone:      Fax:     Reason for request: continuity of care   Information to be released:    [  ] LAST COLONOSCOPY,  including any PATH REPORT and follow-up  [  ] LAST FIT/COLOGUARD RESULT [  ] LAST DEXA  [  ] LAST MAMMOGRAM  [  ] LAST PAP  [  ] LAST LABS [x ] RETINA EXAM REPORT  [  ] IMMUNIZATION RECORDS  [  ] Release all info      [  ] Check here and initial the line next to each item to release ALL health information INCLUDING  _____ Care and treatment for drug and / or alcohol abuse  _____ HIV testing, infection status, or AIDS  _____ Genetic Testing    DATES OF SERVICE OR TIME PERIOD TO BE DISCLOSED: _____________  I understand and acknowledge that:  * This Authorization may be revoked at any time by you in writing, except if your health information has already been used or disclosed.  * Your health information that will be used or disclosed as a result of you signing this authorization could be re-disclosed by the recipient. If this occurs, your re-disclosed health information may no longer be protected by State or Federal laws.  * You may refuse to sign this Authorization. Your refusal will not affect your ability to obtain treatment.  * This Authorization becomes effective upon signing and will  on (date) __________.      If no date is indicated, this Authorization will  one (1) year from the signature date.    Name: Tylor Acevedo  Signature: Date:   2023      PLEASE FAX REQUESTED RECORDS BACK TO: [phone]

## 2023-06-06 NOTE — PROGRESS NOTES
"  Subjective:     Tylor Hill is a 47 y.o. male presenting for a follow up          Current Outpatient Medications:     glimepiride (AMARYL) 4 MG Tab, Take 1 Tablet by mouth 2 times a day., Disp: 180 Tablet, Rfl: 1    Empagliflozin 25 MG Tab, Take 25 mg by mouth every day., Disp: 90 Tablet, Rfl: 1    gabapentin (NEURONTIN) 300 MG Cap, Take 1 Capsule by mouth 3 times a day., Disp: 270 Capsule, Rfl: 1    escitalopram (LEXAPRO) 10 MG Tab, Take 1 Tablet by mouth every day., Disp: 90 Tablet, Rfl: 1    Objective:     Vitals: /78   Pulse 94   Temp 36.4 °C (97.6 °F)   Resp 16   Ht 1.778 m (5' 10\")   Wt 96.2 kg (212 lb)   SpO2 98%   BMI 30.42 kg/m²   General: Alert  HEENT: Normocephalic.   Heart: Regular rate and rhythm.  S1 and S2 normal.  No murmurs appreciated.  Respiratory: Normal respiratory effort.  Clear to auscultation bilaterally.  Abdomen: Non-distended, soft  Extremities: No leg edema.    Assessment/Plan:     Diagnoses and all orders for this visit:    Type 2 diabetes mellitus with hyperglycemia, without long-term current use of insulin (HCC)  Chronic, uncontrolled.  His A1c today is 13.4.  He has not been taking glimepiride.  We discussed increasing glimepiride to 4 mg twice a day and adding Jardiance daily.  Eye exam records requested.  We will do foot exam at his next visit.  Follow-up in 3 months  -     POCT Hemoglobin A1C  -     Comp Metabolic Panel; Future  -     MICROALBUMIN CREAT RATIO URINE; Future  -     glimepiride (AMARYL) 4 MG Tab; Take 1 Tablet by mouth 2 times a day.  -     Empagliflozin 25 MG Tab; Take 25 mg by mouth every day.    Microalbuminuria  Undiagnosed new problem with uncertain prognosis.  We discussed his last labs, recommended starting losartan.  However, as he is already starting 3 different medications today, we will prescribe this at his next visit    Hypertriglyceridemia  Chronic, declined statin  -     Comp Metabolic Panel; Future  -     Lipid Profile; " Future    Cervical radiculopathy  Chronic, worsening.  He was seeing [facility] neurosurgery who recommended surgery, but he is not interested at this time.  He is interested in injections and epidurals.  Referral to PM&R placed.  In the meantime, he could try gabapentin  -     Referral to Pain Management  -     gabapentin (NEURONTIN) 300 MG Cap; Take 1 Capsule by mouth 3 times a day.    Anxiety  Chronic, uncontrolled.  He would like to restart escitalopram  -     escitalopram (LEXAPRO) 10 MG Tab; Take 1 Tablet by mouth every day.    Chewing tobacco use  Advised to quit, patient is working on this    Obesity (BMI 30-39.9)  -     Patient identified as having weight management issue.  Appropriate orders and counseling given.    Need for hepatitis C screening test  -     HEP C VIRUS ANTIBODY; Future    Screen for colon cancer  -     Referral to GI for Colonoscopy          Return in about 3 months (around 9/6/2023) for routine follow up.

## 2023-06-27 ENCOUNTER — OFFICE VISIT (OUTPATIENT)
Dept: PHYSICAL MEDICINE AND REHAB | Facility: MEDICAL CENTER | Age: 48
End: 2023-06-27
Payer: COMMERCIAL

## 2023-06-27 VITALS
HEIGHT: 70 IN | WEIGHT: 222.22 LBS | SYSTOLIC BLOOD PRESSURE: 114 MMHG | BODY MASS INDEX: 31.81 KG/M2 | OXYGEN SATURATION: 98 % | TEMPERATURE: 98.2 F | DIASTOLIC BLOOD PRESSURE: 82 MMHG | HEART RATE: 95 BPM

## 2023-06-27 DIAGNOSIS — M47.812 CERVICAL SPONDYLOSIS: ICD-10-CM

## 2023-06-27 DIAGNOSIS — G44.86 CERVICOGENIC HEADACHE: ICD-10-CM

## 2023-06-27 DIAGNOSIS — E11.65 TYPE 2 DIABETES MELLITUS WITH HYPERGLYCEMIA, WITHOUT LONG-TERM CURRENT USE OF INSULIN (HCC): ICD-10-CM

## 2023-06-27 DIAGNOSIS — Z71.82 EXERCISE COUNSELING: ICD-10-CM

## 2023-06-27 DIAGNOSIS — M54.81 BILATERAL OCCIPITAL NEURALGIA: ICD-10-CM

## 2023-06-27 DIAGNOSIS — M48.02 CERVICAL SPINAL STENOSIS: ICD-10-CM

## 2023-06-27 DIAGNOSIS — F17.200 TOBACCO DEPENDENCE: ICD-10-CM

## 2023-06-27 DIAGNOSIS — M54.12 CERVICAL RADICULOPATHY: ICD-10-CM

## 2023-06-27 DIAGNOSIS — E66.9 OBESITY (BMI 30-39.9): ICD-10-CM

## 2023-06-27 PROCEDURE — 3074F SYST BP LT 130 MM HG: CPT | Performed by: PHYSICAL MEDICINE & REHABILITATION

## 2023-06-27 PROCEDURE — 1125F AMNT PAIN NOTED PAIN PRSNT: CPT | Performed by: PHYSICAL MEDICINE & REHABILITATION

## 2023-06-27 PROCEDURE — 99204 OFFICE O/P NEW MOD 45 MIN: CPT | Performed by: PHYSICAL MEDICINE & REHABILITATION

## 2023-06-27 PROCEDURE — 3079F DIAST BP 80-89 MM HG: CPT | Performed by: PHYSICAL MEDICINE & REHABILITATION

## 2023-06-27 ASSESSMENT — PATIENT HEALTH QUESTIONNAIRE - PHQ9
5. POOR APPETITE OR OVEREATING: 2 - MORE THAN HALF THE DAYS
CLINICAL INTERPRETATION OF PHQ2 SCORE: 1
SUM OF ALL RESPONSES TO PHQ QUESTIONS 1-9: 9

## 2023-06-27 ASSESSMENT — FIBROSIS 4 INDEX: FIB4 SCORE: 0.57

## 2023-06-27 ASSESSMENT — PAIN SCALES - GENERAL: PAINLEVEL: 10=SEVERE PAIN

## 2023-06-27 NOTE — H&P (VIEW-ONLY)
New patient note    Interventional spine and Pain  Physiatry (physical medicine and  Rehabilitation)     Date of service: See epic    Chief complaint:   Chief Complaint   Patient presents with    Establish Care     Neck pain        Referring provider: Luz Peña M.D.     HISTORY    HPI: Tylor Hill 47 y.o.  who presents today with Diagnoses of Cervical radiculopathy, Cervical spinal stenosis, Cervical spondylosis, Cervicogenic headache, Bilateral occipital neuralgia, Type 2 diabetes mellitus with hyperglycemia, without long-term current use of insulin ([facility]), Obesity (BMI 30-39.9), Exercise counseling, and Tobacco dependence were pertinent to this visit.    HPI    The patient is a chronic neck pain for many years which radiates down the bilateral arms with pain in the arms.  Aching burning sharp in quality.  This is been gradually worsening.  Previously the patient's pain was 7 out of 10 intensity.  Now the pain is 10 out of 10 intensity.  The patient works as a .    He is previously seen by Dr. Katie Thompson at [facility].  He notes an epidural steroid injection was done with 100% pain relief of greater than 2 years.       Medical records review:  I reviewed the note from the referring provider Luz Peña M.D. including the note dated 6/ 6/ 2023.           ROS:   Red Flags ROS:   Fever, Chills, Sweats: Denies  Involuntary Weight Loss: Denies  Bladder Incontinence: Denies  Bowel Incontinence: denies  Saddle Anesthesia: Denies    All other systems reviewed and negative.       PMHx:   Past Medical History:   Diagnosis Date    Bell's palsy     Diabetes     Mononucleosis          Current Outpatient Medications on File Prior to Visit   Medication Sig Dispense Refill    glimepiride (AMARYL) 4 MG Tab Take 1 Tablet by mouth 2 times a day. 180 Tablet 1    gabapentin (NEURONTIN) 300 MG Cap Take 1 Capsule by mouth 3 times a day. 270 Capsule 1    escitalopram (LEXAPRO) 10 MG  Tab Take 1 Tablet by mouth every day. 90 Tablet 1    Empagliflozin 25 MG Tab Take 25 mg by mouth every day. (Patient not taking: Reported on 6/27/2023) 90 Tablet 1     No current facility-administered medications on file prior to visit.        PSHx:   Past Surgical History:   Procedure Laterality Date    OTHER ORTHOPEDIC SURGERY      1996 - L4-5 diskectomy       Family history   History reviewed. No pertinent family history.      Medications: reviewed on epic.   Outpatient Medications Marked as Taking for the 6/27/23 encounter (Office Visit) with Cosmo Nicholas M.D.   Medication Sig Dispense Refill    glimepiride (AMARYL) 4 MG Tab Take 1 Tablet by mouth 2 times a day. 180 Tablet 1    gabapentin (NEURONTIN) 300 MG Cap Take 1 Capsule by mouth 3 times a day. 270 Capsule 1    escitalopram (LEXAPRO) 10 MG Tab Take 1 Tablet by mouth every day. 90 Tablet 1        Allergies:   No Known Allergies    Social Hx:   Social History     Socioeconomic History    Marital status:      Spouse name: Not on file    Number of children: Not on file    Years of education: Not on file    Highest education level: Associate degree: occupational, technical, or vocational program   Occupational History    Not on file   Tobacco Use    Smoking status: Every Day     Types: Cigarettes    Smokeless tobacco: Current     Types: Chew    Tobacco comments:     chewing tobacco once daily   Vaping Use    Vaping Use: Never used   Substance and Sexual Activity    Alcohol use: No     Alcohol/week: 0.0 oz    Drug use: No    Sexual activity: Not Currently     Partners: Female   Other Topics Concern    Not on file   Social History Narrative         Social Determinants of Health     Financial Resource Strain: High Risk (9/19/2022)    Overall Financial Resource Strain (CARDIA)     Difficulty of Paying Living Expenses: Hard   Food Insecurity: Food Insecurity Present (9/19/2022)    Hunger Vital Sign     Worried About Running Out of  "Food in the Last Year: Sometimes true     Ran Out of Food in the Last Year: Often true   Transportation Needs: Unmet Transportation Needs (9/19/2022)    PRAPARE - Transportation     Lack of Transportation (Medical): Yes     Lack of Transportation (Non-Medical): Yes   Physical Activity: Unknown (9/19/2022)    Exercise Vital Sign     Days of Exercise per Week: 7 days     Minutes of Exercise per Session: Patient refused   Stress: Stress Concern Present (9/19/2022)    [employer] [place] [employer] - Occupational Stress Questionnaire     Feeling of Stress : Very much   Social Connections: Unknown (9/19/2022)    Social Connection and Isolation Panel [NHANES]     Frequency of Communication with Friends and Family: Patient refused     Frequency of Social Gatherings with Friends and Family: Patient refused     Attends Anabaptism Services: Patient refused     Active Member of Clubs or Organizations: Patient refused     Attends Club or Organization Meetings: Patient refused     Marital Status:    Intimate Partner Violence: Not on file   Housing Stability: High Risk (9/19/2022)    Housing Stability Vital Sign     Unable to Pay for Housing in the Last Year: Yes     Number of Places Lived in the Last Year: Not on file     Unstable Housing in the Last Year: Patient refused         EXAMINATION     Physical Exam:   Vitals: /82 (BP Location: Left arm, Patient Position: Sitting, BP Cuff Size: Large adult)   Pulse 95   Temp 36.8 °C (98.2 °F) (Temporal)   Ht 1.778 m (5' 10\")   Wt 101 kg (222 lb 3.6 oz)   SpO2 98%     Constitutional:   Body Habitus: Body mass index is 31.89 kg/m².  Cooperation: Fully cooperates with exam  Appearance: Well-groomed, well-nourished, not disheveled     Eyes: No scleral icterus to suggest severe liver disease, no proptosis to suggest severe hyperthyroid    ENT -no obvious auditory deficits, no obvious tongue lesions, tongue midline, no facial droop     Skin -no rashes or lesions " noted     Respiratory-  breathing comfortable on room air, no audible wheezing    Cardiovascular- capillary refills less than 2 seconds.     Psychiatric- alert and oriented ×3. Normal affect.       Musculoskeletal and Neuro -       Cervical spine   Inspection: No deformities of the skin over the cervical spine. No rashes or lesions.    decreased  active range of motion in all directions, with  pain      Spurling’s sign: positive bilaterally    No signs of muscular atrophy in bilateral upper extremities     No tenderness to palpation of the cervical spine    Key points for the international standards for neurological classification of spinal cord injury (ISNCSCI) to light touch.     Dermatome R L   C4 2 2   C5 2 2   C6 1 1   C7 1 1   C8 2 2   T1 2 2   T2 2 2                                     Motor Exam Upper Extremities   ? Myotome R L   Shoulder flexion C5 4+ 4+   Elbow flexion C5 4+ 4+   Wrist extension C6 4+ 4+   Elbow extension C7 4+ 4+   Finger flexion C8 4+ 4+   Finger abduction T1 4+ 4+     Reflexes  ?  R L   Biceps  2+ 2+   Brachioradialis  2+ 2+     Titus’s sign negative bilaterally     Strength is 5 out of 5 in hip flexion and knee extension bilaterally         MEDICAL DECISION MAKING    Medical records review: see under HPI section.     DATA    Labs:   Lab Results   Component Value Date/Time    SODIUM 134 (L) 09/24/2022 07:48 AM    POTASSIUM 4.6 09/24/2022 07:48 AM    CHLORIDE 100 09/24/2022 07:48 AM    CO2 21 09/24/2022 07:48 AM    ANION 13.0 09/24/2022 07:48 AM    GLUCOSE 417 (H) 09/24/2022 07:48 AM    BUN 10 09/24/2022 07:48 AM    CREATININE 0.70 09/24/2022 07:48 AM    CALCIUM 9.4 09/24/2022 07:48 AM    ASTSGOT 12 09/24/2022 07:48 AM    ALTSGPT 13 09/24/2022 07:48 AM    TBILIRUBIN 0.4 09/24/2022 07:48 AM    ALBUMIN 4.6 09/24/2022 07:48 AM    TOTPROTEIN 7.3 09/24/2022 07:48 AM    GLOBULIN 2.7 09/24/2022 07:48 AM    AGRATIO 1.7 09/24/2022 07:48 AM   ]    Lab Results   Component Value Date/Time     PROTHROMBTM 17.1 (H) 08/03/2018 12:50 AM    INR 1.43 (H) 08/03/2018 12:50 AM        Lab Results   Component Value Date/Time    WBC 7.4 09/24/2022 07:48 AM    RBC 5.62 09/24/2022 07:48 AM    HEMOGLOBIN 17.3 09/24/2022 07:48 AM    HEMATOCRIT 49.6 09/24/2022 07:48 AM    MCV 88.3 09/24/2022 07:48 AM    MCH 30.8 09/24/2022 07:48 AM    MCHC 34.9 09/24/2022 07:48 AM    MPV 11.1 09/24/2022 07:48 AM    NEUTSPOLYS 49.00 08/08/2018 08:53 AM    LYMPHOCYTES 38.90 08/08/2018 08:53 AM    MONOCYTES 9.40 08/08/2018 08:53 AM    EOSINOPHILS 1.70 08/08/2018 08:53 AM    BASOPHILS 0.70 08/08/2018 08:53 AM        Lab Results   Component Value Date/Time    HBA1C 13.4 (A) 06/06/2023 02:49 PM        Imaging:   I personally reviewed following images, these are my reads          IMAGING radiology reads. I reviewed the following radiology reads                        Results for orders placed during the hospital encounter of 10/16/07    MR-LUMBAR SPINE-WITH & W/O    Impression  IMPRESSION:    1. LARGE CENTRAL DISC EXTRUSION AT THE L4-5 LEVEL WHICH RESULTS IN MARKED  DEFORMITY OF THE UNDERLYING THECAL SAC.  THIS LARGE EXTRUSION WHEN  COMBINED WITH FACET DEGENERATION RESULTS IN SEVERE CENTRAL CANAL  NARROWING.    2. POSTSURGICAL CHANGE IS SEEN AT THE L4-5 LEVEL CONSISTENT WITH RIGHT  HEMILAMINOTOMY.  THERE IS ENHANCING SCAR TISSUE SEEN VENTRAL TO THE  THECAL SAC AT THIS LEVEL AS WELL AS DORSALLY ALONG THE SURGICAL TRACT.    3. L3-4 ANNULAR DISC BULGE AND FACET DEGENERATION WHICH RESULT IN MILD  CENTRAL CANAL NARROWING.    4. AREAS OF NEURAL FORAMINAL NARROWING SEEN AS DESCRIBED ABOVE.  THESE  CHANGES ARE SECONDARY TO FACET DEGENERATION AND ANNULAR DISC BULGES.    5. MILD SUPERIOR ENDPLATE WEDGE DEFORMITY SEEN INVOLVING THE ELEVENTH  THORACIC VERTEBRA WHICH APPEARS CHRONIC IN NATURE.    JESSICA/rupinder      Read By BRANDON CHUN MD on Oct 16 2007  9:10AM  : [facility] Transcription Date: Oct 16 2007  3:33PM  THIS DOCUMENT HAS BEEN ELECTRONICALLY  SIGNED BY: BRANDON CHUN MD on Oct  16 2007  4:48PM                                              Results for orders placed in visit on 05/13/16    DX-CHEST-2 VIEWS    Impression  No active disease.     Results for orders placed during the hospital encounter of 03/31/07    DX-ELBOW-COMPLETE 3+    Impression  IMPRESSION:    NO FRACTURE OR DISLOCATION OF RIGHT ELBOW.    MM/wjk      Read By DARRELL PICKETT MD on Apr 2 2007 11:30AM  : [facility] Transcription Date: Apr 2 2007 11:47AM  THIS DOCUMENT HAS BEEN ELECTRONICALLY SIGNED BY: DARRELL PICKETT MD on Apr 2 2007  3:02PM         Results for orders placed during the hospital encounter of 07/14/08    DX-HAND 3+    Impression  IMPRESSION:    NORMAL THREE VIEWS LEFT HAND.                Results for orders placed in visit on 05/05/16    [id number] (WITH 1-VIEW CXR) RIGHT    Impression  No evidence of right rib fracture.              Results for orders placed during the hospital encounter of 07/14/08    DX-WRIST-COMPLETE 3+    Impression  IMPRESSION:    NORMAL FOUR VIEWS LEFT WRIST.    TJS/srd    Read By DIAMOND SAGE MD on Jul 14 2008  1:06PM  : [facility] Transcription Date: Jul 14 2008  4:11PM  THIS DOCUMENT HAS BEEN ELECTRONICALLY SIGNED BY: DIAMOND SAGE MD on  Jul 15 2008 12:25PM         Diagnosis   Visit Diagnoses     ICD-10-CM   1. Cervical radiculopathy  M54.12   2. Cervical spinal stenosis  M48.02   3. Cervical spondylosis  M47.812   4. Cervicogenic headache  G44.86   5. Bilateral occipital neuralgia  M54.81   6. Type 2 diabetes mellitus with hyperglycemia, without long-term current use of insulin (HCC)  E11.65   7. Obesity (BMI 30-39.9)  E66.9   8. Exercise counseling  Z71.82   9. Tobacco dependence  F17.200           ASSESSMENT AND PLAN:  Tylor Hill 47 y.o. male      Tylor was seen today for establish care.    Diagnoses and all orders for this visit:    Cervical radiculopathy  -     Referral to Physical Medicine  Rehab  -     MR-CERVICAL SPINE-W/O; Future    Cervical spinal stenosis  -     MR-CERVICAL SPINE-W/O; Future    Cervical spondylosis  -     MR-CERVICAL SPINE-W/O; Future    Cervicogenic headache  -     MR-CERVICAL SPINE-W/O; Future    Bilateral occipital neuralgia    Type 2 diabetes mellitus with hyperglycemia, without long-term current use of insulin (HCC)    Obesity (BMI 30-39.9)    Exercise counseling    Tobacco dependence        I believe the patient's pain is mainly coming from cervical radiculopathy.  The patient reports previously he had 100% pain relief for about 2 years after the last epidural steroid injection.  Pain is returned and is severe.  He wants to avoid surgery if possible.    I have ordered an MRI cervical spine to confirm safety for a cervical epidural steroid injection.    We will plan on proceeding with a C7-T1 cervical interlaminar epidural steroid injection after the MRI cervical spine    The risks benefits and alternatives to this procedure were discussed and the patient wishes to proceed with the procedure. Risks include but are not limited to damage to surrounding structures, infection, bleeding, worsening of pain which can be permanent, weakness which can be permanent. Benefits include pain relief, improved function. Alternatives includes not doing the procedure.      home exercise program: I provided the patient with a strengthening and stretching with a home exercise program     Diagnostic workup: As above    Medications:   Continue gabapentin      Outside records requested:  The patient signed outside records request form for his outside records including outside images. This includes the records from [facility]surgery group    I advised quitting smoking and we discussed the health benefits of quitting smoking. I also provided information for QUIT tobacco program at [facility]. The patient was instructed to call [phone] or to visit our website at [email or web address]. We discussed  the patient may be a candidate for counseling through the program. This discussion was 3 minutes of counseling.         Follow-up: After the above diagnostic studies and procedure          Please note that this dictation was created using voice recognition software. I have made every reasonable attempt to correct obvious errors but there may be errors of grammar and content that I may have overlooked prior to finalization of this note.      Cosmo Nicholas MD  Physical Medicine and Rehabilitation  Interventional Spine and Sports Physiatry  [facility] Luz Peña M.D.

## 2023-06-27 NOTE — PROGRESS NOTES
New patient note    Interventional spine and Pain  Physiatry (physical medicine and  Rehabilitation)     Date of service: See epic    Chief complaint:   Chief Complaint   Patient presents with    Establish Care     Neck pain        Referring provider: Luz Peña M.D.     HISTORY    HPI: Tylor Hill 47 y.o.  who presents today with Diagnoses of Cervical radiculopathy, Cervical spinal stenosis, Cervical spondylosis, Cervicogenic headache, Bilateral occipital neuralgia, Type 2 diabetes mellitus with hyperglycemia, without long-term current use of insulin ([facility]), Obesity (BMI 30-39.9), Exercise counseling, and Tobacco dependence were pertinent to this visit.    HPI    The patient is a chronic neck pain for many years which radiates down the bilateral arms with pain in the arms.  Aching burning sharp in quality.  This is been gradually worsening.  Previously the patient's pain was 7 out of 10 intensity.  Now the pain is 10 out of 10 intensity.  The patient works as a .    He is previously seen by Dr. Katie Thompson at [facility] neurosurgery group.  He notes an epidural steroid injection was done with 100% pain relief of greater than 2 years.       Medical records review:  I reviewed the note from the referring provider Luz Peña M.D. including the note dated 6/ 6/ 2023.           ROS:   Red Flags ROS:   Fever, Chills, Sweats: Denies  Involuntary Weight Loss: Denies  Bladder Incontinence: Denies  Bowel Incontinence: denies  Saddle Anesthesia: Denies    All other systems reviewed and negative.       PMHx:   Past Medical History:   Diagnosis Date    Bell's palsy     Diabetes     Mononucleosis          Current Outpatient Medications on File Prior to Visit   Medication Sig Dispense Refill    glimepiride (AMARYL) 4 MG Tab Take 1 Tablet by mouth 2 times a day. 180 Tablet 1    gabapentin (NEURONTIN) 300 MG Cap Take 1 Capsule by mouth 3 times a day. 270 Capsule 1    escitalopram (LEXAPRO) 10 MG  Tab Take 1 Tablet by mouth every day. 90 Tablet 1    Empagliflozin 25 MG Tab Take 25 mg by mouth every day. (Patient not taking: Reported on 6/27/2023) 90 Tablet 1     No current facility-administered medications on file prior to visit.        PSHx:   Past Surgical History:   Procedure Laterality Date    OTHER ORTHOPEDIC SURGERY      1996 - L4-5 diskectomy       Family history   History reviewed. No pertinent family history.      Medications: reviewed on epic.   Outpatient Medications Marked as Taking for the 6/27/23 encounter (Office Visit) with Cosmo Nicholas M.D.   Medication Sig Dispense Refill    glimepiride (AMARYL) 4 MG Tab Take 1 Tablet by mouth 2 times a day. 180 Tablet 1    gabapentin (NEURONTIN) 300 MG Cap Take 1 Capsule by mouth 3 times a day. 270 Capsule 1    escitalopram (LEXAPRO) 10 MG Tab Take 1 Tablet by mouth every day. 90 Tablet 1        Allergies:   No Known Allergies    Social Hx:   Social History     Socioeconomic History    Marital status:      Spouse name: Not on file    Number of children: Not on file    Years of education: Not on file    Highest education level: Associate degree: occupational, technical, or vocational program   Occupational History    Not on file   Tobacco Use    Smoking status: Every Day     Types: Cigarettes    Smokeless tobacco: Current     Types: Chew    Tobacco comments:     chewing tobacco once daily   Vaping Use    Vaping Use: Never used   Substance and Sexual Activity    Alcohol use: No     Alcohol/week: 0.0 oz    Drug use: No    Sexual activity: Not Currently     Partners: Female   Other Topics Concern    Not on file   Social History Narrative         Social Determinants of Health     Financial Resource Strain: High Risk (9/19/2022)    Overall Financial Resource Strain (CARDIA)     Difficulty of Paying Living Expenses: Hard   Food Insecurity: Food Insecurity Present (9/19/2022)    Hunger Vital Sign     Worried About Running Out of  "Food in the Last Year: Sometimes true     Ran Out of Food in the Last Year: Often true   Transportation Needs: Unmet Transportation Needs (9/19/2022)    PRAPARE - Transportation     Lack of Transportation (Medical): Yes     Lack of Transportation (Non-Medical): Yes   Physical Activity: Unknown (9/19/2022)    Exercise Vital Sign     Days of Exercise per Week: 7 days     Minutes of Exercise per Session: Patient refused   Stress: Stress Concern Present (9/19/2022)    [employer] - Occupational Stress Questionnaire     Feeling of Stress : Very much   Social Connections: Unknown (9/19/2022)    Social Connection and Isolation Panel [NHANES]     Frequency of Communication with Friends and Family: Patient refused     Frequency of Social Gatherings with Friends and Family: Patient refused     Attends [employer] Services: Patient refused     Active Member of Clubs or Organizations: Patient refused     Attends Club or Organization Meetings: Patient refused     Marital Status:    Intimate Partner Violence: Not on file   Housing Stability: High Risk (9/19/2022)    Housing Stability Vital Sign     Unable to Pay for Housing in the Last Year: Yes     Number of Places Lived in the Last Year: Not on file     Unstable Housing in the Last Year: Patient refused         EXAMINATION     Physical Exam:   Vitals: /82 (BP Location: Left arm, Patient Position: Sitting, BP Cuff Size: Large adult)   Pulse 95   Temp 36.8 °C (98.2 °F) (Temporal)   Ht 1.778 m (5' 10\")   Wt 101 kg (222 lb 3.6 oz)   SpO2 98%     Constitutional:   Body Habitus: Body mass index is 31.89 kg/m².  Cooperation: Fully cooperates with exam  Appearance: Well-groomed, well-nourished, not disheveled     Eyes: No scleral icterus to suggest severe liver disease, no proptosis to suggest severe hyperthyroid    ENT -no obvious auditory deficits, no obvious tongue lesions, tongue midline, no facial droop     Skin -no rashes or lesions " noted     Respiratory-  breathing comfortable on room air, no audible wheezing    Cardiovascular- capillary refills less than 2 seconds.     Psychiatric- alert and oriented ×3. Normal affect.       Musculoskeletal and Neuro -       Cervical spine   Inspection: No deformities of the skin over the cervical spine. No rashes or lesions.    decreased  active range of motion in all directions, with  pain      Spurling’s sign: positive bilaterally    No signs of muscular atrophy in bilateral upper extremities     No tenderness to palpation of the cervical spine    Key points for the international standards for neurological classification of spinal cord injury (ISNCSCI) to light touch.     Dermatome R L   C4 2 2   C5 2 2   C6 1 1   C7 1 1   C8 2 2   T1 2 2   T2 2 2                                     Motor Exam Upper Extremities   ? Myotome R L   Shoulder flexion C5 4+ 4+   Elbow flexion C5 4+ 4+   Wrist extension C6 4+ 4+   Elbow extension C7 4+ 4+   Finger flexion C8 4+ 4+   Finger abduction T1 4+ 4+     Reflexes  ?  R L   Biceps  2+ 2+   Brachioradialis  2+ 2+     Titus’s sign negative bilaterally     Strength is 5 out of 5 in hip flexion and knee extension bilaterally         MEDICAL DECISION MAKING    Medical records review: see under HPI section.     DATA    Labs:   Lab Results   Component Value Date/Time    SODIUM 134 (L) 09/24/2022 07:48 AM    POTASSIUM 4.6 09/24/2022 07:48 AM    CHLORIDE 100 09/24/2022 07:48 AM    CO2 21 09/24/2022 07:48 AM    ANION 13.0 09/24/2022 07:48 AM    GLUCOSE 417 (H) 09/24/2022 07:48 AM    BUN 10 09/24/2022 07:48 AM    CREATININE 0.70 09/24/2022 07:48 AM    CALCIUM 9.4 09/24/2022 07:48 AM    ASTSGOT 12 09/24/2022 07:48 AM    ALTSGPT 13 09/24/2022 07:48 AM    TBILIRUBIN 0.4 09/24/2022 07:48 AM    ALBUMIN 4.6 09/24/2022 07:48 AM    TOTPROTEIN 7.3 09/24/2022 07:48 AM    GLOBULIN 2.7 09/24/2022 07:48 AM    AGRATIO 1.7 09/24/2022 07:48 AM   ]    Lab Results   Component Value Date/Time     PROTHROMBTM 17.1 (H) 08/03/2018 12:50 AM    INR 1.43 (H) 08/03/2018 12:50 AM        Lab Results   Component Value Date/Time    WBC 7.4 09/24/2022 07:48 AM    RBC 5.62 09/24/2022 07:48 AM    HEMOGLOBIN 17.3 09/24/2022 07:48 AM    HEMATOCRIT 49.6 09/24/2022 07:48 AM    MCV 88.3 09/24/2022 07:48 AM    MCH 30.8 09/24/2022 07:48 AM    MCHC 34.9 09/24/2022 07:48 AM    MPV 11.1 09/24/2022 07:48 AM    NEUTSPOLYS 49.00 08/08/2018 08:53 AM    LYMPHOCYTES 38.90 08/08/2018 08:53 AM    MONOCYTES 9.40 08/08/2018 08:53 AM    EOSINOPHILS 1.70 08/08/2018 08:53 AM    BASOPHILS 0.70 08/08/2018 08:53 AM        Lab Results   Component Value Date/Time    HBA1C 13.4 (A) 06/06/2023 02:49 PM        Imaging:   I personally reviewed following images, these are my reads          IMAGING radiology reads. I reviewed the following radiology reads                        Results for orders placed during the hospital encounter of 10/16/07    MR-LUMBAR SPINE-WITH & W/O    Impression  IMPRESSION:    1. LARGE CENTRAL DISC EXTRUSION AT THE L4-5 LEVEL WHICH RESULTS IN MARKED  DEFORMITY OF THE UNDERLYING THECAL SAC.  THIS LARGE EXTRUSION WHEN  COMBINED WITH FACET DEGENERATION RESULTS IN SEVERE CENTRAL CANAL  NARROWING.    2. POSTSURGICAL CHANGE IS SEEN AT THE L4-5 LEVEL CONSISTENT WITH RIGHT  HEMILAMINOTOMY.  THERE IS ENHANCING SCAR TISSUE SEEN VENTRAL TO THE  THECAL SAC AT THIS LEVEL AS WELL AS DORSALLY ALONG THE SURGICAL TRACT.    3. L3-4 ANNULAR DISC BULGE AND FACET DEGENERATION WHICH RESULT IN MILD  CENTRAL CANAL NARROWING.    4. AREAS OF NEURAL FORAMINAL NARROWING SEEN AS DESCRIBED ABOVE.  THESE  CHANGES ARE SECONDARY TO FACET DEGENERATION AND ANNULAR DISC BULGES.    5. MILD SUPERIOR ENDPLATE WEDGE DEFORMITY SEEN INVOLVING THE ELEVENTH  THORACIC VERTEBRA WHICH APPEARS CHRONIC IN NATURE.    JESSICA/rupinder      Read By BRANDON CHUN MD on Oct 16 2007  9:10AM  : [facility] Transcription Date: Oct 16 2007  3:33PM  THIS DOCUMENT HAS BEEN ELECTRONICALLY  SIGNED BY: BRANDON CHUN MD on Oct  16 2007  4:48PM                                              Results for orders placed in visit on 05/13/16    DX-CHEST-2 VIEWS    Impression  No active disease.     Results for orders placed during the hospital encounter of 03/31/07    DX-ELBOW-COMPLETE 3+    Impression  IMPRESSION:    NO FRACTURE OR DISLOCATION OF RIGHT ELBOW.    MM/wjk      Read By DARRELL PICKETT MD on Apr 2 2007 11:30AM  : [facility] Transcription Date: Apr 2 2007 11:47AM  THIS DOCUMENT HAS BEEN ELECTRONICALLY SIGNED BY: DARRELL PICKETT MD on Apr 2 2007  3:02PM         Results for orders placed during the hospital encounter of 07/14/08    DX-HAND 3+    Impression  IMPRESSION:    NORMAL THREE VIEWS LEFT HAND.                Results for orders placed in visit on 05/05/16    [id number] (WITH 1-VIEW CXR) RIGHT    Impression  No evidence of right rib fracture.              Results for orders placed during the hospital encounter of 07/14/08    DX-WRIST-COMPLETE 3+    Impression  IMPRESSION:    NORMAL FOUR VIEWS LEFT WRIST.    TJS/srd    Read By DIAMOND SAGE MD on Jul 14 2008  1:06PM  : [facility] Transcription Date: Jul 14 2008  4:11PM  THIS DOCUMENT HAS BEEN ELECTRONICALLY SIGNED BY: DIAMOND SAGE MD on  Jul 15 2008 12:25PM         Diagnosis   Visit Diagnoses     ICD-10-CM   1. Cervical radiculopathy  M54.12   2. Cervical spinal stenosis  M48.02   3. Cervical spondylosis  M47.812   4. Cervicogenic headache  G44.86   5. Bilateral occipital neuralgia  M54.81   6. Type 2 diabetes mellitus with hyperglycemia, without long-term current use of insulin (HCC)  E11.65   7. Obesity (BMI 30-39.9)  E66.9   8. Exercise counseling  Z71.82   9. Tobacco dependence  F17.200           ASSESSMENT AND PLAN:  Tylor Hill 47 y.o. male      Tylor was seen today for establish care.    Diagnoses and all orders for this visit:    Cervical radiculopathy  -     Referral to Physical Medicine  Rehab  -     MR-CERVICAL SPINE-W/O; Future    Cervical spinal stenosis  -     MR-CERVICAL SPINE-W/O; Future    Cervical spondylosis  -     MR-CERVICAL SPINE-W/O; Future    Cervicogenic headache  -     MR-CERVICAL SPINE-W/O; Future    Bilateral occipital neuralgia    Type 2 diabetes mellitus with hyperglycemia, without long-term current use of insulin (HCC)    Obesity (BMI 30-39.9)    Exercise counseling    Tobacco dependence        I believe the patient's pain is mainly coming from cervical radiculopathy.  The patient reports previously he had 100% pain relief for about 2 years after the last epidural steroid injection.  Pain is returned and is severe.  He wants to avoid surgery if possible.    I have ordered an MRI cervical spine to confirm safety for a cervical epidural steroid injection.    We will plan on proceeding with a C7-T1 cervical interlaminar epidural steroid injection after the MRI cervical spine    The risks benefits and alternatives to this procedure were discussed and the patient wishes to proceed with the procedure. Risks include but are not limited to damage to surrounding structures, infection, bleeding, worsening of pain which can be permanent, weakness which can be permanent. Benefits include pain relief, improved function. Alternatives includes not doing the procedure.      home exercise program: I provided the patient with a strengthening and stretching with a home exercise program     Diagnostic workup: As above    Medications:   Continue gabapentin      Outside records requested:  The patient signed outside records request form for his outside records including outside images. This includes the records from [facility] neurosurgery group    I advised quitting smoking and we discussed the health benefits of quitting smoking. I also provided information for QUIT tobacco program at [employer]. The patient was instructed to call [phone] or to visit our website at [email or web address]. We discussed  the patient may be a candidate for counseling through the program. This discussion was 3 minutes of counseling.         Follow-up: After the above diagnostic studies and procedure          Please note that this dictation was created using voice recognition software. I have made every reasonable attempt to correct obvious errors but there may be errors of grammar and content that I may have overlooked prior to finalization of this note.      Cosmo Nicholas MD  Physical Medicine and Rehabilitation  Interventional Spine and Sports Physiatry  [facility] Luz Peña M.D.

## 2023-06-29 ENCOUNTER — APPOINTMENT (OUTPATIENT)
Dept: RADIOLOGY | Facility: MEDICAL CENTER | Age: 48
End: 2023-06-29
Attending: PHYSICAL MEDICINE & REHABILITATION
Payer: COMMERCIAL

## 2023-07-03 ENCOUNTER — APPOINTMENT (OUTPATIENT)
Dept: RADIOLOGY | Facility: MEDICAL CENTER | Age: 48
End: 2023-07-03
Attending: PHYSICAL MEDICINE & REHABILITATION
Payer: COMMERCIAL

## 2023-07-03 DIAGNOSIS — M47.812 CERVICAL SPONDYLOSIS: ICD-10-CM

## 2023-07-03 DIAGNOSIS — M48.02 CERVICAL SPINAL STENOSIS: ICD-10-CM

## 2023-07-03 DIAGNOSIS — M54.12 CERVICAL RADICULOPATHY: ICD-10-CM

## 2023-07-03 DIAGNOSIS — G44.86 CERVICOGENIC HEADACHE: ICD-10-CM

## 2023-07-03 PROCEDURE — 72141 MRI NECK SPINE W/O DYE: CPT

## 2023-07-05 ENCOUNTER — APPOINTMENT (OUTPATIENT)
Dept: RADIOLOGY | Facility: REHABILITATION | Age: 48
End: 2023-07-05
Attending: PHYSICAL MEDICINE & REHABILITATION
Payer: COMMERCIAL

## 2023-07-05 ENCOUNTER — HOSPITAL ENCOUNTER (OUTPATIENT)
Facility: REHABILITATION | Age: 48
End: 2023-07-05
Attending: PHYSICAL MEDICINE & REHABILITATION | Admitting: PHYSICAL MEDICINE & REHABILITATION
Payer: COMMERCIAL

## 2023-07-05 VITALS
BODY MASS INDEX: 31.72 KG/M2 | WEIGHT: 221.56 LBS | DIASTOLIC BLOOD PRESSURE: 99 MMHG | HEIGHT: 70 IN | TEMPERATURE: 98.1 F | SYSTOLIC BLOOD PRESSURE: 154 MMHG | HEART RATE: 68 BPM | OXYGEN SATURATION: 99 % | RESPIRATION RATE: 16 BRPM

## 2023-07-05 PROCEDURE — 700111 HCHG RX REV CODE 636 W/ 250 OVERRIDE (IP)

## 2023-07-05 PROCEDURE — 700117 HCHG RX CONTRAST REV CODE 255

## 2023-07-05 PROCEDURE — 62321 NJX INTERLAMINAR CRV/THRC: CPT

## 2023-07-05 RX ORDER — LIDOCAINE HYDROCHLORIDE 10 MG/ML
INJECTION, SOLUTION EPIDURAL; INFILTRATION; INTRACAUDAL; PERINEURAL
Status: COMPLETED
Start: 2023-07-05 | End: 2023-07-05

## 2023-07-05 RX ORDER — DEXAMETHASONE SODIUM PHOSPHATE 10 MG/ML
INJECTION, SOLUTION INTRAMUSCULAR; INTRAVENOUS
Status: COMPLETED
Start: 2023-07-05 | End: 2023-07-05

## 2023-07-05 RX ADMIN — LIDOCAINE HYDROCHLORIDE 10 ML: 10 INJECTION, SOLUTION EPIDURAL; INFILTRATION; INTRACAUDAL at 08:52

## 2023-07-05 RX ADMIN — DEXAMETHASONE SODIUM PHOSPHATE 10 MG: 10 INJECTION, SOLUTION INTRAMUSCULAR; INTRAVENOUS at 08:53

## 2023-07-05 RX ADMIN — IOHEXOL 10 ML: 240 INJECTION, SOLUTION INTRATHECAL; INTRAVASCULAR; INTRAVENOUS; ORAL at 08:53

## 2023-07-05 ASSESSMENT — PAIN DESCRIPTION - PAIN TYPE
TYPE: CHRONIC PAIN
TYPE: CHRONIC PAIN

## 2023-07-05 ASSESSMENT — FIBROSIS 4 INDEX: FIB4 SCORE: 0.57

## 2023-07-05 NOTE — PROGRESS NOTES
0808 Pt received to pre procedure area. ID band and allergies verified. Vital signs taken and stable. Verified that patient has not taken NSAIDS, anticoagulants or blood thinners in past 5 days. Pt's history reviewed. Reviewed post op instructions with patient, questions answered, verbalized understanding. Pt seen by Dr. Nicholas  pre procedure discussed, questions answered.  Pt states he checked his blood sugar this am 0500 = 171.    0858  Pt received to recovery area, report received from procedure RN Myesha . Vitals taken and stable. Patient tolerated po fluids and snack without difficulty. Dressing clean, dry and intact. Pt declined ice pack.    0908 Pt seen by Dr. Nicholas post procedure, orders received for discharge. Patient ambulatory without difficulty. Pt discharged to designated .

## 2023-07-05 NOTE — INTERVAL H&P NOTE
H&P reviewed. The patient was examined and there are no changes to the H&P     Lungs clear to auscultation bilaterally.  No abdominal tenderness.  Heart regular rate and rhythm.  Vitals reviewed.    Proceed with the originally scheduled procedure.  The risks, benefits and alternatives were discussed.  The patient understands these.    Pre-Op Diagnosis Codes:     * Cervical radiculopathy [M54.12]  Procedure(s):  C7-T1 interlaminar epidural steroid injection    Cosmo Nicholas MD  Physical Medicine and Rehabilitation  Interventional Spine and Sports Physiatry  [facility]        
H&P reviewed. The patient was examined and there are no changes to the H&P.  The patient's glucose on his home monitor was 171 at 5am today.     Lungs clear to auscultation bilaterally.  No abdominal tenderness.  Heart regular rate and rhythm.  Vitals reviewed.    Proceed with the originally scheduled procedure.  The risks, benefits and alternatives were discussed.  The patient understands these.    Pre-Op Diagnosis Codes:     * Cervical radiculopathy [M54.12]  Procedure(s):  C7-T1 interlaminar epidural steroid injection    Cosmo Nicholas MD  Physical Medicine and Rehabilitation  Interventional Spine and Sports Physiatry  [facility]        
no

## 2023-07-05 NOTE — OP REPORT
Date of Service: 7/5/2023    Physician/s: Cosmo Nicholas MD    Pre-operative Diagnosis: Cervical radiculopathy    Post-operative Diagnosis: Cervical radiculopathy    Procedure: C7-T1  Cervical interlaminar epidural steroid injection    Description of procedure:    The risks, benefits, and alternatives of the procedure were reviewed and discussed with the patient.  Written informed consent was freely obtained. A pre-procedural time-out was conducted by the physician verifying patient’s identity, procedure to be performed, procedure site and side, and allergy verification. Appropriate equipment was determined to be in place for the procedure.         The patient's vital signs were carefully monitored before, throughout, and after the procedure.     In the fluoroscopy suite the patient was placed in a prone position, a pillow placed underneath their chest. The skin was prepped and draped in the usual sterile fashion. The fluoroscope was placed over the cervical neck at the appropriate injection AP angle view, and the target for injection was marked. A 25g needle was placed into the marked site, and approx 2mL of 1% Lidocaine was injected subcutaneously into the epidermal and dermal layers. The needle was removed. A 20g Tuohy needle was then placed and advanced under fluoroscopic guidance into the LEFT C7-T1 interlaminar space at both the initial position AP view and contralateral oblique at a lateral view to ensure proper location of the needle tip at all times.  The patient does have bilateral neck pain radiating to the bilateral arms but the left side is significant worse.  The needle was advanced with fluoroscopic guidance to the superior aspect of the T1 lamina.  Then a contralateral oblique view was used to advance the needle slightly towards the epidural space, A loss-of-resistance technique was used to guide the needle into the epidural space in a lateral fluoroscopic view and confirmed with loss of resistance  with sterile normal saline. contrast dye was used to highlight the epidural space spread while the fluoroscope was running live. Following negative aspiration, 1mL of 10mg/mL of dexamethasone followed by 2 mL of sterile saline . The needle was removed intact after restyleted. The patient's back was covered with a 4x4 gauze, the area was cleansed with sterile normal saline, and a dressing was applied. There were no complications noted.     The patient was then evaluated post-procedure, and was hemodynamically stable prior to leaving the post-operative care unit.       Preprocedural pain: 8/10 NRS  Postprocedural pain: 0/10 NRS    Follow-up as scheduled    Cosmo Nicholas MD  Interventional Spine and Pain  Physical Medicine and Rehabilitation  [facility]        CPT  interlaminar cervical/thoracic epidural: [id number]

## 2023-07-05 NOTE — OR SURGEON
Immediate Post OP Note    Pre-Op Diagnosis Codes:     * Cervical radiculopathy [M54.12]      Post-Op Diagnosis Codes:     * Cervical radiculopathy [M54.12]      Procedure(s):  C7-T1 interlaminar epidural steroid injection - Wound Class: Clean    Surgeon(s):  Cosmo Nicholas M.D.    Anesthesiologist/Type of Anesthesia:  No anesthesia staff entered./Local    Surgical Staff:  Circulator: Myesha Jhaveri R.N.  Scrub Person: Ciera Jha  Radiology Technologist: Edith Mei    Specimens removed if any:  * No specimens in log *    Estimated Blood Loss: None    Findings: None    Complications: None        7/5/2023 8:59 AM Cosmo Nicholas M.D.

## 2023-07-26 ENCOUNTER — TELEPHONE (OUTPATIENT)
Dept: PHYSICAL MEDICINE AND REHAB | Facility: MEDICAL CENTER | Age: 48
End: 2023-07-26
Payer: COMMERCIAL

## 2023-07-26 NOTE — TELEPHONE ENCOUNTER
C7-T1 Interlaminar epid steroid inj    Called for post-sp check-up. Pt reported the following regarding the procedure site:    Change in pain?: LVM    Concerns?: LVM    Confirmed [facility] appt?: LVM 7/27  **No-Show for 7/19 [facility]**

## 2023-10-03 RX ORDER — ESCITALOPRAM OXALATE 10 MG/1
10 TABLET ORAL DAILY
Qty: 90 TABLET | Refills: 1 | Status: SHIPPED | OUTPATIENT
Start: 2023-10-03

## 2023-10-19 ENCOUNTER — DOCUMENTATION (OUTPATIENT)
Dept: HEALTH INFORMATION MANAGEMENT | Facility: OTHER | Age: 48
End: 2023-10-19
Payer: COMMERCIAL

## 2023-11-09 NOTE — PROGRESS NOTES
"Patient verbalized these concerns to this RN:    \"Are you going to come wake me up at 4:30 in the morning to draw blood? Because I am refusing, I don't want anyone poking me anymore.\"    \"Who are the doctors and when are they going to come and see me? Or maybe management that can come talk to me or whoever that can sign me off that I am good to go so I can leave, because I have no reason to be here.\"    Education provided. Charge RN notified. Will endorse to day shift RN.  " Immunization information and VIS for flu vaccine(s) was reviewed; verbal consent given by patient.

## 2024-02-23 ENCOUNTER — TELEPHONE (OUTPATIENT)
Dept: HEALTH INFORMATION MANAGEMENT | Facility: OTHER | Age: 49
End: 2024-02-23
Payer: COMMERCIAL

## 2024-05-31 DIAGNOSIS — E11.65 TYPE 2 DIABETES MELLITUS WITH HYPERGLYCEMIA, WITHOUT LONG-TERM CURRENT USE OF INSULIN (HCC): ICD-10-CM

## 2024-06-05 ENCOUNTER — TELEPHONE (OUTPATIENT)
Dept: HEALTH INFORMATION MANAGEMENT | Facility: OTHER | Age: 49
End: 2024-06-05